# Patient Record
Sex: FEMALE | Race: WHITE | NOT HISPANIC OR LATINO | Employment: OTHER | URBAN - METROPOLITAN AREA
[De-identification: names, ages, dates, MRNs, and addresses within clinical notes are randomized per-mention and may not be internally consistent; named-entity substitution may affect disease eponyms.]

---

## 2021-05-28 ENCOUNTER — RECORDS - HEALTHEAST (OUTPATIENT)
Dept: ADMINISTRATIVE | Facility: CLINIC | Age: 86
End: 2021-05-28

## 2021-05-31 ENCOUNTER — RECORDS - HEALTHEAST (OUTPATIENT)
Dept: ADMINISTRATIVE | Facility: CLINIC | Age: 86
End: 2021-05-31

## 2021-06-01 ENCOUNTER — RECORDS - HEALTHEAST (OUTPATIENT)
Dept: ADMINISTRATIVE | Facility: CLINIC | Age: 86
End: 2021-06-01

## 2021-06-07 ENCOUNTER — RECORDS - HEALTHEAST (OUTPATIENT)
Dept: LAB | Facility: CLINIC | Age: 86
End: 2021-06-07

## 2021-06-07 ENCOUNTER — TRANSFERRED RECORDS (OUTPATIENT)
Dept: HEALTH INFORMATION MANAGEMENT | Facility: CLINIC | Age: 86
End: 2021-06-07

## 2021-06-07 ENCOUNTER — RECORDS - HEALTHEAST (OUTPATIENT)
Dept: ADMINISTRATIVE | Facility: OTHER | Age: 86
End: 2021-06-07

## 2021-06-07 LAB
ANION GAP SERPL CALCULATED.3IONS-SCNC: 13 MMOL/L (ref 5–18)
BUN SERPL-MCNC: 46 MG/DL (ref 8–28)
CALCIUM SERPL-MCNC: 10.4 MG/DL (ref 8.5–10.5)
CHLORIDE BLD-SCNC: 110 MMOL/L (ref 98–107)
CO2 SERPL-SCNC: 18 MMOL/L (ref 22–31)
CREAT SERPL-MCNC: 2.32 MG/DL (ref 0.6–1.1)
GFR SERPL CREATININE-BSD FRML MDRD: 20 ML/MIN/1.73M2
GLUCOSE BLD-MCNC: 109 MG/DL (ref 70–125)
POTASSIUM BLD-SCNC: 4.4 MMOL/L (ref 3.5–5)
SODIUM SERPL-SCNC: 141 MMOL/L (ref 136–145)

## 2021-06-08 LAB
SARS-COV-2 PCR COMMENT: NORMAL
SARS-COV-2 RNA SPEC QL NAA+PROBE: NEGATIVE
SARS-COV-2 VIRUS SPECIMEN SOURCE: NORMAL

## 2021-06-10 RX ORDER — GEMFIBROZIL 600 MG/1
600 TABLET, FILM COATED ORAL
Status: SHIPPED | COMMUNITY
Start: 2021-06-10

## 2021-06-10 RX ORDER — LATANOPROST 50 UG/ML
1 SOLUTION/ DROPS OPHTHALMIC AT BEDTIME
Status: SHIPPED | COMMUNITY
Start: 2021-06-10

## 2021-06-10 RX ORDER — DILTIAZEM HCL 60 MG
60 TABLET ORAL 4 TIMES DAILY
Status: SHIPPED | COMMUNITY
Start: 2021-06-10 | End: 2021-09-22

## 2021-06-10 RX ORDER — CITALOPRAM HYDROBROMIDE 20 MG/1
20 TABLET ORAL DAILY
Status: ON HOLD | COMMUNITY
Start: 2021-06-10 | End: 2021-09-28

## 2021-06-14 ENCOUNTER — HOSPITAL ENCOUNTER (OUTPATIENT)
Dept: NUCLEAR MEDICINE | Facility: HOSPITAL | Age: 86
Discharge: HOME | End: 2021-06-14
Payer: MEDICARE

## 2021-06-14 ENCOUNTER — HOSPITAL ENCOUNTER (OUTPATIENT)
Dept: CARDIOLOGY | Facility: HOSPITAL | Age: 86
Discharge: HOME | End: 2021-06-14
Payer: MEDICARE

## 2021-06-14 DIAGNOSIS — R94.31 ABNORMAL EKG: ICD-10-CM

## 2021-06-14 LAB
CV STRESS CURRENT BP HE: NORMAL
CV STRESS CURRENT HR HE: 68
CV STRESS CURRENT HR HE: 70
CV STRESS CURRENT HR HE: 70
CV STRESS CURRENT HR HE: 79
CV STRESS CURRENT HR HE: 80
CV STRESS CURRENT HR HE: 80
CV STRESS CURRENT HR HE: 81
CV STRESS CURRENT HR HE: 81
CV STRESS CURRENT HR HE: 82
CV STRESS CURRENT HR HE: 83
CV STRESS CURRENT HR HE: 83
CV STRESS DEVIATION TIME HE: NORMAL
CV STRESS ECHO PERCENT HR HE: NORMAL
CV STRESS EXERCISE STAGE HE: NORMAL
CV STRESS FINAL RESTING BP HE: NORMAL
CV STRESS FINAL RESTING HR HE: 80
CV STRESS MAX HR HE: 85
CV STRESS MAX TREADMILL GRADE HE: 0
CV STRESS MAX TREADMILL SPEED HE: 0
CV STRESS PEAK DIA BP HE: NORMAL
CV STRESS PEAK SYS BP HE: NORMAL
CV STRESS PHASE HE: NORMAL
CV STRESS PROTOCOL HE: NORMAL
CV STRESS RESTING PT POSITION HE: NORMAL
CV STRESS ST DEVIATION AMOUNT HE: NORMAL
CV STRESS ST DEVIATION ELEVATION HE: NORMAL
CV STRESS ST EVELATION AMOUNT HE: NORMAL
CV STRESS TEST TYPE HE: NORMAL
CV STRESS TOTAL STAGE TIME MIN 1 HE: NORMAL
RATE PRESSURE PRODUCT: NORMAL
STRESS ECHO BASELINE DIASTOLIC HE: 83
STRESS ECHO BASELINE HR: 69
STRESS ECHO BASELINE SYSTOLIC BP: 194
STRESS ECHO CALCULATED PERCENT HR: 62 %
STRESS ECHO LAST STRESS DIASTOLIC BP: 82
STRESS ECHO LAST STRESS HR: 82
STRESS ECHO LAST STRESS SYSTOLIC BP: 164
STRESS ECHO TARGET HR: 137

## 2021-06-15 ENCOUNTER — RECORDS - HEALTHEAST (OUTPATIENT)
Dept: LAB | Facility: CLINIC | Age: 86
End: 2021-06-15

## 2021-06-15 LAB
ANION GAP SERPL CALCULATED.3IONS-SCNC: 12 MMOL/L (ref 5–18)
BUN SERPL-MCNC: 44 MG/DL (ref 8–28)
CALCIUM SERPL-MCNC: 10 MG/DL (ref 8.5–10.5)
CHLORIDE BLD-SCNC: 105 MMOL/L (ref 98–107)
CO2 SERPL-SCNC: 21 MMOL/L (ref 22–31)
CREAT SERPL-MCNC: 1.93 MG/DL (ref 0.6–1.1)
GFR SERPL CREATININE-BSD FRML MDRD: 25 ML/MIN/1.73M2
GLUCOSE BLD-MCNC: 140 MG/DL (ref 70–125)
POTASSIUM BLD-SCNC: 4.6 MMOL/L (ref 3.5–5)
SODIUM SERPL-SCNC: 138 MMOL/L (ref 136–145)

## 2021-06-19 ENCOUNTER — RECORDS - HEALTHEAST (OUTPATIENT)
Dept: LAB | Facility: CLINIC | Age: 86
End: 2021-06-19

## 2021-07-09 ENCOUNTER — RECORDS - HEALTHEAST (OUTPATIENT)
Dept: LAB | Facility: CLINIC | Age: 86
End: 2021-07-09

## 2021-07-09 LAB
ALBUMIN SERPL-MCNC: 3.4 G/DL (ref 3.5–5)
ALP SERPL-CCNC: 96 U/L (ref 45–120)
ALT SERPL W P-5'-P-CCNC: 11 U/L (ref 0–45)
ANION GAP SERPL CALCULATED.3IONS-SCNC: 12 MMOL/L (ref 5–18)
AST SERPL W P-5'-P-CCNC: 20 U/L (ref 0–40)
BILIRUB SERPL-MCNC: 0.3 MG/DL (ref 0–1)
BUN SERPL-MCNC: 33 MG/DL (ref 8–28)
CALCIUM SERPL-MCNC: 10.4 MG/DL (ref 8.5–10.5)
CHLORIDE BLD-SCNC: 108 MMOL/L (ref 98–107)
CHOLEST SERPL-MCNC: 225 MG/DL
CO2 SERPL-SCNC: 17 MMOL/L (ref 22–31)
CREAT SERPL-MCNC: 1.9 MG/DL (ref 0.6–1.1)
CREAT UR-MCNC: 80.2 MG/DL
FASTING STATUS PATIENT QL REPORTED: ABNORMAL
GFR SERPL CREATININE-BSD FRML MDRD: 25 ML/MIN/1.73M2
GLUCOSE BLD-MCNC: 98 MG/DL (ref 70–125)
HDLC SERPL-MCNC: 61 MG/DL
LDLC SERPL CALC-MCNC: 152 MG/DL
MICROALBUMIN UR-MCNC: 12.45 MG/DL (ref 0–1.99)
MICROALBUMIN/CREAT UR: 155.2 MG/G
POTASSIUM BLD-SCNC: 4.4 MMOL/L (ref 3.5–5)
PROT SERPL-MCNC: 6.5 G/DL (ref 6–8)
SODIUM SERPL-SCNC: 137 MMOL/L (ref 136–145)
TRIGL SERPL-MCNC: 62 MG/DL

## 2021-09-22 ENCOUNTER — APPOINTMENT (OUTPATIENT)
Dept: CARDIOLOGY | Facility: HOSPITAL | Age: 86
DRG: 644 | End: 2021-09-22
Attending: INTERNAL MEDICINE
Payer: COMMERCIAL

## 2021-09-22 ENCOUNTER — APPOINTMENT (OUTPATIENT)
Dept: RADIOLOGY | Facility: HOSPITAL | Age: 86
DRG: 644 | End: 2021-09-22
Attending: EMERGENCY MEDICINE
Payer: COMMERCIAL

## 2021-09-22 ENCOUNTER — APPOINTMENT (OUTPATIENT)
Dept: ULTRASOUND IMAGING | Facility: HOSPITAL | Age: 86
DRG: 644 | End: 2021-09-22
Attending: INTERNAL MEDICINE
Payer: COMMERCIAL

## 2021-09-22 ENCOUNTER — APPOINTMENT (OUTPATIENT)
Dept: CT IMAGING | Facility: HOSPITAL | Age: 86
DRG: 644 | End: 2021-09-22
Attending: INTERNAL MEDICINE
Payer: COMMERCIAL

## 2021-09-22 ENCOUNTER — HOSPITAL ENCOUNTER (INPATIENT)
Facility: HOSPITAL | Age: 86
LOS: 6 days | Discharge: NURSING, REHAB OR LONG-TERM CARE | DRG: 644 | End: 2021-09-28
Attending: EMERGENCY MEDICINE | Admitting: INTERNAL MEDICINE
Payer: COMMERCIAL

## 2021-09-22 DIAGNOSIS — I50.9 ACUTE ON CHRONIC CONGESTIVE HEART FAILURE, UNSPECIFIED HEART FAILURE TYPE (H): ICD-10-CM

## 2021-09-22 DIAGNOSIS — N18.4 STAGE 4 CHRONIC KIDNEY DISEASE (H): ICD-10-CM

## 2021-09-22 DIAGNOSIS — N17.9 AKI (ACUTE KIDNEY INJURY) (H): ICD-10-CM

## 2021-09-22 DIAGNOSIS — F32.1 MODERATE MAJOR DEPRESSION, SINGLE EPISODE (H): Primary | ICD-10-CM

## 2021-09-22 DIAGNOSIS — E87.1 HYPONATREMIA: ICD-10-CM

## 2021-09-22 DIAGNOSIS — K59.00 CONSTIPATION, UNSPECIFIED CONSTIPATION TYPE: ICD-10-CM

## 2021-09-22 PROBLEM — S22.39XA RIB FRACTURE: Status: ACTIVE | Noted: 2019-07-09

## 2021-09-22 PROBLEM — M81.0 SENILE OSTEOPOROSIS: Status: ACTIVE | Noted: 2021-09-22

## 2021-09-22 LAB
ALBUMIN UR-MCNC: 100 MG/DL
AMMONIA PLAS-SCNC: 22 UMOL/L (ref 11–35)
ANION GAP SERPL CALCULATED.3IONS-SCNC: 10 MMOL/L (ref 5–18)
APPEARANCE UR: CLEAR
ATRIAL RATE - MUSE: 64 BPM
BASE EXCESS BLDA CALC-SCNC: -2.9 MMOL/L
BILIRUB UR QL STRIP: NEGATIVE
BNP SERPL-MCNC: 478 PG/ML (ref 0–167)
BUN SERPL-MCNC: 28 MG/DL (ref 8–28)
CALCIUM SERPL-MCNC: 9.9 MG/DL (ref 8.5–10.5)
CHLORIDE BLD-SCNC: 90 MMOL/L (ref 98–107)
CO2 SERPL-SCNC: 22 MMOL/L (ref 22–31)
COHGB MFR BLD: 97.2 % (ref 95–96)
COLOR UR AUTO: COLORLESS
CORTIS SERPL-MCNC: 15.5 UG/DL
CREAT SERPL-MCNC: 1.48 MG/DL (ref 0.6–1.1)
DIASTOLIC BLOOD PRESSURE - MUSE: NORMAL MMHG
ERYTHROCYTE [DISTWIDTH] IN BLOOD BY AUTOMATED COUNT: 12.7 % (ref 10–15)
GFR SERPL CREATININE-BSD FRML MDRD: 33 ML/MIN/1.73M2
GLUCOSE BLD-MCNC: 113 MG/DL (ref 70–125)
GLUCOSE UR STRIP-MCNC: NEGATIVE MG/DL
HCO3 BLD-SCNC: 22 MMOL/L (ref 23–29)
HCT VFR BLD AUTO: 34.9 % (ref 35–47)
HGB BLD-MCNC: 12.2 G/DL (ref 11.7–15.7)
HGB UR QL STRIP: NEGATIVE
HOLD SPECIMEN: NORMAL
HOLD SPECIMEN: NORMAL
INTERPRETATION ECG - MUSE: NORMAL
KETONES UR STRIP-MCNC: NEGATIVE MG/DL
LEUKOCYTE ESTERASE UR QL STRIP: NEGATIVE
LVEF ECHO: NORMAL
MCH RBC QN AUTO: 30.7 PG (ref 26.5–33)
MCHC RBC AUTO-ENTMCNC: 35 G/DL (ref 31.5–36.5)
MCV RBC AUTO: 88 FL (ref 78–100)
NITRATE UR QL: NEGATIVE
OSMOLALITY SERPL: 258 MOSM/KG (ref 270–300)
OSMOLALITY UR: 297 MOSM/KG (ref 300–900)
OXYHGB MFR BLD: 95.4 % (ref 95–96)
P AXIS - MUSE: 26 DEGREES
PCO2 BLD: 34 MM HG (ref 35–45)
PH BLD: 7.42 [PH] (ref 7.37–7.44)
PH UR STRIP: 7 [PH] (ref 5–7)
PLATELET # BLD AUTO: 372 10E3/UL (ref 150–450)
PO2 BLD: 76 MM HG (ref 75–85)
POTASSIUM BLD-SCNC: 4.1 MMOL/L (ref 3.5–5)
PR INTERVAL - MUSE: 176 MS
QRS DURATION - MUSE: 92 MS
QT - MUSE: 434 MS
QTC - MUSE: 447 MS
R AXIS - MUSE: 14 DEGREES
RBC # BLD AUTO: 3.98 10E6/UL (ref 3.8–5.2)
RBC URINE: <1 /HPF
SARS-COV-2 RNA RESP QL NAA+PROBE: NEGATIVE
SODIUM SERPL-SCNC: 120 MMOL/L (ref 136–145)
SODIUM SERPL-SCNC: 122 MMOL/L (ref 136–145)
SODIUM SERPL-SCNC: 124 MMOL/L (ref 136–145)
SODIUM SERPL-SCNC: 126 MMOL/L (ref 136–145)
SODIUM SERPL-SCNC: 127 MMOL/L (ref 136–145)
SODIUM UR-SCNC: 35 MMOL/L
SP GR UR STRIP: 1.01 (ref 1–1.03)
SQUAMOUS EPITHELIAL: <1 /HPF
SYSTOLIC BLOOD PRESSURE - MUSE: NORMAL MMHG
T AXIS - MUSE: 101 DEGREES
TEMPERATURE: 37 DEGREES C
TROPONIN I SERPL-MCNC: 0.04 NG/ML (ref 0–0.29)
TROPONIN I SERPL-MCNC: 0.04 NG/ML (ref 0–0.29)
TSH SERPL DL<=0.005 MIU/L-ACNC: 1.45 UIU/ML (ref 0.3–5)
UROBILINOGEN UR STRIP-MCNC: <2 MG/DL
VENTILATION MODE: ABNORMAL
VENTRICULAR RATE- MUSE: 64 BPM
WBC # BLD AUTO: 10.1 10E3/UL (ref 4–11)
WBC URINE: 1 /HPF

## 2021-09-22 PROCEDURE — 86255 FLUORESCENT ANTIBODY SCREEN: CPT | Performed by: INTERNAL MEDICINE

## 2021-09-22 PROCEDURE — 272N000451 HC KIT SHRLOCK 5FR POWER PICC DOUBLE LUMEN

## 2021-09-22 PROCEDURE — 84484 ASSAY OF TROPONIN QUANT: CPT | Performed by: INTERNAL MEDICINE

## 2021-09-22 PROCEDURE — 36600 WITHDRAWAL OF ARTERIAL BLOOD: CPT

## 2021-09-22 PROCEDURE — 93005 ELECTROCARDIOGRAM TRACING: CPT

## 2021-09-22 PROCEDURE — 84300 ASSAY OF URINE SODIUM: CPT | Performed by: INTERNAL MEDICINE

## 2021-09-22 PROCEDURE — 250N000013 HC RX MED GY IP 250 OP 250 PS 637: Performed by: EMERGENCY MEDICINE

## 2021-09-22 PROCEDURE — 70450 CT HEAD/BRAIN W/O DYE: CPT

## 2021-09-22 PROCEDURE — 83520 IMMUNOASSAY QUANT NOS NONAB: CPT

## 2021-09-22 PROCEDURE — 84999 UNLISTED CHEMISTRY PROCEDURE: CPT | Performed by: INTERNAL MEDICINE

## 2021-09-22 PROCEDURE — 93005 ELECTROCARDIOGRAM TRACING: CPT | Performed by: EMERGENCY MEDICINE

## 2021-09-22 PROCEDURE — 83880 ASSAY OF NATRIURETIC PEPTIDE: CPT | Performed by: EMERGENCY MEDICINE

## 2021-09-22 PROCEDURE — 250N000011 HC RX IP 250 OP 636: Performed by: EMERGENCY MEDICINE

## 2021-09-22 PROCEDURE — 76770 US EXAM ABDO BACK WALL COMP: CPT

## 2021-09-22 PROCEDURE — 84484 ASSAY OF TROPONIN QUANT: CPT | Performed by: EMERGENCY MEDICINE

## 2021-09-22 PROCEDURE — 250N000013 HC RX MED GY IP 250 OP 250 PS 637: Performed by: STUDENT IN AN ORGANIZED HEALTH CARE EDUCATION/TRAINING PROGRAM

## 2021-09-22 PROCEDURE — 36415 COLL VENOUS BLD VENIPUNCTURE: CPT | Performed by: EMERGENCY MEDICINE

## 2021-09-22 PROCEDURE — 84295 ASSAY OF SERUM SODIUM: CPT | Performed by: INTERNAL MEDICINE

## 2021-09-22 PROCEDURE — 71045 X-RAY EXAM CHEST 1 VIEW: CPT

## 2021-09-22 PROCEDURE — 999N000157 HC STATISTIC RCP TIME EA 10 MIN

## 2021-09-22 PROCEDURE — 82805 BLOOD GASES W/O2 SATURATION: CPT | Performed by: INTERNAL MEDICINE

## 2021-09-22 PROCEDURE — 82140 ASSAY OF AMMONIA: CPT | Performed by: INTERNAL MEDICINE

## 2021-09-22 PROCEDURE — 85027 COMPLETE CBC AUTOMATED: CPT | Performed by: EMERGENCY MEDICINE

## 2021-09-22 PROCEDURE — 96374 THER/PROPH/DIAG INJ IV PUSH: CPT

## 2021-09-22 PROCEDURE — 93306 TTE W/DOPPLER COMPLETE: CPT | Mod: 26 | Performed by: INTERNAL MEDICINE

## 2021-09-22 PROCEDURE — 250N000009 HC RX 250: Performed by: STUDENT IN AN ORGANIZED HEALTH CARE EDUCATION/TRAINING PROGRAM

## 2021-09-22 PROCEDURE — 99285 EMERGENCY DEPT VISIT HI MDM: CPT | Mod: 25

## 2021-09-22 PROCEDURE — 93306 TTE W/DOPPLER COMPLETE: CPT

## 2021-09-22 PROCEDURE — 87635 SARS-COV-2 COVID-19 AMP PRB: CPT | Performed by: EMERGENCY MEDICINE

## 2021-09-22 PROCEDURE — 80048 BASIC METABOLIC PNL TOTAL CA: CPT | Performed by: EMERGENCY MEDICINE

## 2021-09-22 PROCEDURE — 99223 1ST HOSP IP/OBS HIGH 75: CPT | Mod: AI | Performed by: INTERNAL MEDICINE

## 2021-09-22 PROCEDURE — 83935 ASSAY OF URINE OSMOLALITY: CPT | Performed by: INTERNAL MEDICINE

## 2021-09-22 PROCEDURE — 84999 UNLISTED CHEMISTRY PROCEDURE: CPT

## 2021-09-22 PROCEDURE — C9803 HOPD COVID-19 SPEC COLLECT: HCPCS

## 2021-09-22 PROCEDURE — 36415 COLL VENOUS BLD VENIPUNCTURE: CPT | Performed by: INTERNAL MEDICINE

## 2021-09-22 PROCEDURE — 81001 URINALYSIS AUTO W/SCOPE: CPT | Performed by: STUDENT IN AN ORGANIZED HEALTH CARE EDUCATION/TRAINING PROGRAM

## 2021-09-22 PROCEDURE — 210N000001 HC R&B IMCU HEART CARE

## 2021-09-22 PROCEDURE — 82533 TOTAL CORTISOL: CPT | Performed by: INTERNAL MEDICINE

## 2021-09-22 PROCEDURE — 83930 ASSAY OF BLOOD OSMOLALITY: CPT | Performed by: INTERNAL MEDICINE

## 2021-09-22 PROCEDURE — 36569 INSJ PICC 5 YR+ W/O IMAGING: CPT

## 2021-09-22 PROCEDURE — 84443 ASSAY THYROID STIM HORMONE: CPT | Performed by: INTERNAL MEDICINE

## 2021-09-22 PROCEDURE — 250N000013 HC RX MED GY IP 250 OP 250 PS 637: Performed by: INTERNAL MEDICINE

## 2021-09-22 PROCEDURE — 250N000011 HC RX IP 250 OP 636: Performed by: INTERNAL MEDICINE

## 2021-09-22 RX ORDER — DILTIAZEM HYDROCHLORIDE 120 MG/1
240 CAPSULE, EXTENDED RELEASE ORAL DAILY
Status: DISCONTINUED | OUTPATIENT
Start: 2021-09-22 | End: 2021-09-28 | Stop reason: HOSPADM

## 2021-09-22 RX ORDER — FUROSEMIDE 10 MG/ML
20 INJECTION INTRAMUSCULAR; INTRAVENOUS ONCE
Status: COMPLETED | OUTPATIENT
Start: 2021-09-22 | End: 2021-09-22

## 2021-09-22 RX ORDER — GEMFIBROZIL 600 MG/1
600 TABLET, FILM COATED ORAL
Status: DISCONTINUED | OUTPATIENT
Start: 2021-09-22 | End: 2021-09-28 | Stop reason: HOSPADM

## 2021-09-22 RX ORDER — LORAZEPAM 2 MG/ML
0.5 INJECTION INTRAMUSCULAR ONCE
Status: COMPLETED | OUTPATIENT
Start: 2021-09-22 | End: 2021-09-22

## 2021-09-22 RX ORDER — 3% SODIUM CHLORIDE 3 G/100ML
INJECTION, SOLUTION INTRAVENOUS CONTINUOUS
Status: DISCONTINUED | OUTPATIENT
Start: 2021-09-22 | End: 2021-09-28 | Stop reason: HOSPADM

## 2021-09-22 RX ORDER — BISACODYL 10 MG
10 SUPPOSITORY, RECTAL RECTAL DAILY PRN
Status: DISCONTINUED | OUTPATIENT
Start: 2021-09-22 | End: 2021-09-28 | Stop reason: HOSPADM

## 2021-09-22 RX ORDER — ACETAMINOPHEN 650 MG/1
650 SUPPOSITORY RECTAL EVERY 6 HOURS PRN
Status: DISCONTINUED | OUTPATIENT
Start: 2021-09-22 | End: 2021-09-28 | Stop reason: HOSPADM

## 2021-09-22 RX ORDER — CITALOPRAM HYDROBROMIDE 10 MG/1
20 TABLET ORAL DAILY
Status: DISCONTINUED | OUTPATIENT
Start: 2021-09-22 | End: 2021-09-22

## 2021-09-22 RX ORDER — LORAZEPAM 0.5 MG/1
0.25 TABLET ORAL ONCE
Status: COMPLETED | OUTPATIENT
Start: 2021-09-22 | End: 2021-09-22

## 2021-09-22 RX ORDER — ASPIRIN 81 MG/1
162 TABLET, CHEWABLE ORAL ONCE
Status: COMPLETED | OUTPATIENT
Start: 2021-09-22 | End: 2021-09-22

## 2021-09-22 RX ORDER — AMOXICILLIN 250 MG
2 CAPSULE ORAL 2 TIMES DAILY PRN
Status: DISCONTINUED | OUTPATIENT
Start: 2021-09-22 | End: 2021-09-28 | Stop reason: HOSPADM

## 2021-09-22 RX ORDER — ONDANSETRON 4 MG/1
4 TABLET, ORALLY DISINTEGRATING ORAL EVERY 6 HOURS PRN
Status: DISCONTINUED | OUTPATIENT
Start: 2021-09-22 | End: 2021-09-28 | Stop reason: HOSPADM

## 2021-09-22 RX ORDER — SODIUM BICARBONATE 650 MG/1
1300 TABLET ORAL 2 TIMES DAILY
Status: DISCONTINUED | OUTPATIENT
Start: 2021-09-22 | End: 2021-09-23

## 2021-09-22 RX ORDER — AMOXICILLIN 250 MG
1 CAPSULE ORAL 2 TIMES DAILY PRN
Status: DISCONTINUED | OUTPATIENT
Start: 2021-09-22 | End: 2021-09-28 | Stop reason: HOSPADM

## 2021-09-22 RX ORDER — LATANOPROST 50 UG/ML
1 SOLUTION/ DROPS OPHTHALMIC AT BEDTIME
Status: DISCONTINUED | OUTPATIENT
Start: 2021-09-22 | End: 2021-09-28 | Stop reason: HOSPADM

## 2021-09-22 RX ORDER — ONDANSETRON 2 MG/ML
4 INJECTION INTRAMUSCULAR; INTRAVENOUS EVERY 6 HOURS PRN
Status: DISCONTINUED | OUTPATIENT
Start: 2021-09-22 | End: 2021-09-28 | Stop reason: HOSPADM

## 2021-09-22 RX ORDER — SODIUM BICARBONATE 650 MG/1
1300 TABLET ORAL 2 TIMES DAILY
Status: ON HOLD | COMMUNITY
End: 2021-09-28

## 2021-09-22 RX ORDER — NITROGLYCERIN 80 MG/1
1 PATCH TRANSDERMAL ONCE
Status: COMPLETED | OUTPATIENT
Start: 2021-09-22 | End: 2021-09-22

## 2021-09-22 RX ORDER — CITALOPRAM HYDROBROMIDE 10 MG/1
10 TABLET ORAL DAILY
Status: DISCONTINUED | OUTPATIENT
Start: 2021-09-23 | End: 2021-09-28 | Stop reason: HOSPADM

## 2021-09-22 RX ORDER — LIDOCAINE 40 MG/G
CREAM TOPICAL
Status: DISCONTINUED | OUTPATIENT
Start: 2021-09-22 | End: 2021-09-28 | Stop reason: HOSPADM

## 2021-09-22 RX ORDER — SODIUM CHLORIDE 9 MG/ML
INJECTION, SOLUTION INTRAVENOUS CONTINUOUS
Status: DISCONTINUED | OUTPATIENT
Start: 2021-09-22 | End: 2021-09-22 | Stop reason: ALTCHOICE

## 2021-09-22 RX ORDER — ACETAMINOPHEN 325 MG/1
650 TABLET ORAL EVERY 6 HOURS PRN
Status: DISCONTINUED | OUTPATIENT
Start: 2021-09-22 | End: 2021-09-28 | Stop reason: HOSPADM

## 2021-09-22 RX ORDER — DILTIAZEM HYDROCHLORIDE 240 MG/1
240 CAPSULE, EXTENDED RELEASE ORAL DAILY
COMMUNITY
End: 2022-01-01 | Stop reason: DRUGHIGH

## 2021-09-22 RX ADMIN — LIDOCAINE HYDROCHLORIDE 1.5 ML: 10 INJECTION, SOLUTION EPIDURAL; INFILTRATION; INTRACAUDAL; PERINEURAL at 16:32

## 2021-09-22 RX ADMIN — Medication 1 MG: at 21:45

## 2021-09-22 RX ADMIN — ASPIRIN 81 MG CHEWABLE TABLET 162 MG: 81 TABLET CHEWABLE at 02:48

## 2021-09-22 RX ADMIN — DORZOLAMIDE HYDROCHLORIDE AND TIMOLOL MALEATE 1 DROP: 20; 5 SOLUTION/ DROPS OPHTHALMIC at 21:51

## 2021-09-22 RX ADMIN — FUROSEMIDE 20 MG: 10 INJECTION, SOLUTION INTRAMUSCULAR; INTRAVENOUS at 16:51

## 2021-09-22 RX ADMIN — SODIUM BICARBONATE 1300 MG: 648 TABLET ORAL at 20:10

## 2021-09-22 RX ADMIN — Medication 1 PACKET: at 20:10

## 2021-09-22 RX ADMIN — OMEPRAZOLE 20 MG: 20 CAPSULE, DELAYED RELEASE ORAL at 13:43

## 2021-09-22 RX ADMIN — NITROGLYCERIN 1 PATCH: 0.4 PATCH TRANSDERMAL at 02:48

## 2021-09-22 RX ADMIN — LORAZEPAM 0.25 MG: 0.5 TABLET ORAL at 03:46

## 2021-09-22 RX ADMIN — LATANOPROST 1 DROP: 50 SOLUTION OPHTHALMIC at 21:52

## 2021-09-22 RX ADMIN — DILTIAZEM HYDROCHLORIDE 240 MG: 120 CAPSULE, EXTENDED RELEASE ORAL at 13:43

## 2021-09-22 RX ADMIN — SODIUM BICARBONATE 1300 MG: 648 TABLET ORAL at 13:43

## 2021-09-22 RX ADMIN — LORAZEPAM 0.5 MG: 2 INJECTION INTRAMUSCULAR; INTRAVENOUS at 05:14

## 2021-09-22 RX ADMIN — CITALOPRAM HYDROBROMIDE 20 MG: 20 TABLET ORAL at 13:42

## 2021-09-22 ASSESSMENT — MIFFLIN-ST. JEOR
SCORE: 927.62
SCORE: 949.4
SCORE: 949.4

## 2021-09-22 ASSESSMENT — ACTIVITIES OF DAILY LIVING (ADL): DEPENDENT_IADLS:: TRANSPORTATION;CLEANING

## 2021-09-22 NOTE — ED NOTES
"[facility] ED Handoff Report    ED Chief Complaint: Shortness of breath and confusion    ED Diagnosis:  (E87.1) Hyponatremia  Comment: Most recent Na+ level is 120. Nephrology saw her this morning. He was considering ordering a hypertonic Na+ infusion, but as of this time, has not.   Plan: Continued monitoring of her sodium levels.     (I50.9) Acute on chronic congestive heart failure, unspecified heart failure type (H)  Comment: Bnp was elevated at 478. She does have congestive heart failure. She initially came in with shortness of breath. She has had respiratory rate within normal limits since assuming her care at 7am. Lung sounds were diminished, but clear. No lower extremity edema.  Plan: Monitor I and O.    (N17.9) KAM (acute kidney injury) (H)  Comment: Noting that her creatinine was 1.98 with a GFR of 33.   Plan: She was seen by nephrology (Dr. Saxena)       PMH:  History reviewed. No pertinent past medical history.     Code Status:  No Order     Falls Risk: Yes Band: Applied    Current Living Situation/Residence: lives with their son or daughter     Elimination Status: Continent: No     Activity Level: SBA w/ walker    Patients Preferred Language:  English     Needed: No    Vital Signs:  BP (!) 177/85   Pulse 70   Temp 97.8  F (36.6  C)   Resp 21   Ht 1.6 m (5' 3\")   Wt 50.3 kg (111 lb)   SpO2 96%   BMI 19.66 kg/m       Cardiac Rhythm: Normal sinus rhythm with rates in the 70's, drops into the 50's with sleep.     Pain Score: 1/10    Is the Patient Confused:  Yes    Last Food or Drink: 09/22/21 at noon    Focused Assessment:  Initially my first assessment at 7am revealed that she was very restless and confused, picking has her blankets and gown. Sticking her legs out of the rails. She was difficult to redirect. She had received ativan during the night. At about 9am, she fell asleep and napped about 2 hours. She appears much more alert and appropriate at this time. She was able to " interact with nephrologist well during exam. Lung sounds were diminished and she has not appeared to be short of breath. Heart tones were normal with rhythm per monitor to be in normal sinus rhythm with notched p wave.     Tests Performed: Done: Labs    Treatments Provided:  She does have a pur wick in place. Food intake was good for lunch, eating 85%. I did assist with a bed bath this morning and put lotion on her body for comfort and skin care.    Family Dynamics/Concerns: No    Family Updated On Visitor Policy: Yes    Plan of Care Communicated to Family: Yes    Who Was Updated about Plan of Care: family    Belongings Checklist Done and Signed by Patient: Yes    Covid: asymptomatic , negative    Additional Information: She is very pleasant, but I would consider her at high risk for falls due to confusion.    RN: Cindy Brush RN 9/22/2021 12:42 PM     1: Helped with repositioning at this time onto her right side for comfort and skin protection.   2: Her home medications, including antihypertensives available to give at 1:36pm. Given.

## 2021-09-22 NOTE — CONSULTS
RENAL CONSULTATION:     Date of Consultation:  9/22/2021    Requesting Physician: Dr Desai  Reason for Consult:  Hyponatremia    Assessment/ Recommendations:  1. Hyponatremia: Sodiums of 122 on admission. No noted hx of previous hyponatremia. Urine sodium of 35.  Cortisol normal. TSH pending. Urine osm and serum osm pending. Patient is on selective serotonin reuptake inhibitor and could be SIADH. Euvolemic on exam.  -q4 hour sodium  -Serum osm and urine osm pending.   -Sodium dropped with IVF consistent with SIADH, euvolemic on exam.   -Fluid restriction to 1.2L  -With acute drop and confusion(likey multifactorial with benzo) will start hypertonic until trending >123 today.     Would taper off citalopram at this time.       2. CKD stage 4: at baseline currently. Noted nephrotic range proteinuria of unclear cause. No NSAIDs Previously seen by Dr. Beaver on 9/21. UA on admission only notable for protein. Recent urine protein to creatinine ratio of 5.48. No hx of DM. Unclear duration of proteinuria with limited records at this time. Has MM screen pending from renal clinic on 9/21(not resulted yet). Will get renal US this admission. Will send PLA2R, RUPESH.   3. SOB: CXR clear, slight elevation in BNP noted( patients current renal function is overestimated for her true GFR, very low muscle mass). DOes have large hiatal hernia. No edema on exam. Appears euvolemic. Would get echo per primary.   4. Encephalopathy: Per primary.   5. Hx of hypercalcemia: improved after stopping calcium supplements and vitamin D. Last PTH slightly elevated.  6. Hx of hyperkalemia: monitor.        Momo Xavier,   [facility]  [phone] (off)       History of present illness:  Ms Richardson is a 84 y/o female with pMH of CKD, HTN, HLD, hital hernia. Patient presents with sob, fatigue and lightheadedness. Patient confused overnight and received benzos. Waking up more this am. Still a little confused. Reports no  sob or pain currently. No fever, chills. Reports no issues with urination. No recent NSAID use. Denies nay leg swelling. NO new meds. NO large fluid intake. Reports eating ok. Reports being on citalopram for years. NO other new changes. Falls asleep very easily during eval.     History reviewed. No pertinent past medical history.    Drug and lactation database from the [place] National Library of Medicine:  http://toxnet.nlm.nih.gov/cgi-bin/sis/htmlgen?LACT      Allergies   Allergen Reactions     Statins-Hmg-Coa Reductase Inhibitors [Hmg-Coa-R Inhibitors] Muscle Pain (Myalgia)       Social History     Socioeconomic History     Marital status:      Spouse name: None     Number of children: None     Years of education: None     Highest education level: None   Occupational History     None   Tobacco Use     Smoking status: None   Substance and Sexual Activity     Alcohol use: None     Drug use: None     Sexual activity: None   Other Topics Concern     None   Social History Narrative     None     Social Determinants of Health     Financial Resource Strain:      Difficulty of Paying Living Expenses:    Food Insecurity:      Worried About Running Out of Food in the Last Year:      Ran Out of Food in the Last Year:    Transportation Needs:      Lack of Transportation (Medical):      Lack of Transportation (Non-Medical):    Physical Activity:      Days of Exercise per Week:      Minutes of Exercise per Session:    Stress:      Feeling of Stress :    Social Connections:      Frequency of Communication with Friends and Family:      Frequency of Social Gatherings with Friends and Family:      Attends Taoist Services:      Active Member of Clubs or Organizations:      Attends Club or Organization Meetings:      Marital Status:    Intimate Partner Violence:      Fear of Current or Ex-Partner:      Emotionally Abused:      Physically Abused:      Sexually Abused:        History reviewed. No pertinent family  "history.    Review of Systems: . The remainder of 10 point review of systems is negative except as noted in HPI above.     /68   Pulse 60   Temp 97.8  F (36.6  C)   Resp 14   Ht 1.6 m (5' 3\")   Wt 50.3 kg (111 lb)   SpO2 96%   BMI 19.66 kg/m    No intake or output data in the 24 hours ending 09/22/21 1048  Physical Exam:   GENERAL: frail, chronically ill.   EYES: No scleral icterus  ENT:  Oral mucosa moist  RESP: Clear to auscultation bilaterally with no respiratory distress, normal effort.  CV: RRR, no leg edema.    GI: Soft, NT/ND,   Musculoskeletal: Decreased muscle bulk/ tone; No gross joint abnormalities  SKIN: No rash,   PSYCH:  Fatigued, confused at times.     LABS:  Most Recent 3 CBC's:Recent Labs   Lab Test 09/22/21 0224   WBC 10.1   HGB 12.2   MCV 88        Most Recent 3 BMP's:Recent Labs   Lab Test 09/22/21 0224 07/09/21  1449 06/15/21  0957   * 137 138   POTASSIUM 4.1 4.4 4.6   CHLORIDE 90* 108* 105   CO2 22 17* 21*   BUN 28 33* 44*   CR 1.48* 1.90* 1.93*   ANIONGAP 10 12 12   GISELE 9.9 10.4 10.0    98 140*     Most Recent 2 LFT's:Recent Labs   Lab Test 07/09/21  1449   AST 20   ALT 11   ALKPHOS 96   BILITOTAL 0.3     Most Recent 3 INR's:No lab results found.  Anticoagulation Dose History    There is no flowsheet data to display.         Most Recent 3 Creatinines:Recent Labs   Lab Test 09/22/21 0224 07/09/21  1449 06/15/21  0957   CR 1.48* 1.90* 1.93*     Most Recent 3 Hemoglobins:Recent Labs   Lab Test 09/22/21 0224   HGB 12.2     Most Recent 3 Troponin's:No lab results found.  Most Recent 3 BNP's:No lab results found.  Most Recent D-dimer:No lab results found.  Most Recent Cholesterol Panel:Recent Labs   Lab Test 07/09/21  1449   CHOL 225*   *   HDL 61   TRIG 62       All lab data was reviewed at 10:48 AM    "

## 2021-09-22 NOTE — ED NOTES
ED MD aware of low BP, Pt is a hospitalist pt but has not been evaluated by Hospitalist and no in patient orders have been placed at this time.

## 2021-09-22 NOTE — ED PROVIDER NOTES
EMERGENCY DEPARTMENT ENCOUNTER      FINAL IMPRESSION    1. Hyponatremia    2. Acute on chronic congestive heart failure, unspecified heart failure type (H)    3. KAM (acute kidney injury) (H)        MEDICAL DECISION MAKING    85-year-old with hypertension hyperlipidemia no reported cardiac disease presenting to the ED with progressively worsening shortness of breath now with orthopnea and exertional dyspnea in the absence of pain or pleurisy nor leg swelling or pain.  Vital signs reviewed and arrival with hypertension which may be the cause for hypertensive urgency and flash pulmonary edema.  Additional concern for an occult infectious etiology possible Covid versus bacterial infection though no concurrent systems otherwise.  Additional concerns for ACS.  Do not have a high suspicion for dissection or PE.     IV established lab work is sent is noted with significant hyponatremia 122 which is acute.  Patient BNP elevated in the 400s.  Thusly based on clinical evaluation with orthopnea and mild coarse basilar rales and with elevated BNP I suspect this to be more of a hypervolemic hyponatremic scenario.  We will hold on diuresis and fluid restrict at this time. Troponin is negative.     EKG on arrival is without any active cath activating lesion.     Chest x-ray shows hiatal hernia without significant evidence of infiltrate and no obvious evidence of significant pulmonary edema.     Ultimate through ED course patient given aspirin as well as nitro with improvement.  Patient will be hospitalized for further cardiac investigation and correction of her hyponatremia.  Certainly the patient's large hiatal hernia could be additional supplemental cause for her shortness of breath.     There are no beds available and pt will require transfer versus boarding.     Pt was updated with all results and discussed plan and amenable as above. Pt discussed with hospitalist.    Due to contagious pathogen concern full protective  equipment was utilized through the duration of the interaction including N95  mask, eye shield, hair cover, gown and gloves.     MEDICATIONS GIVEN IN THE EMERGENCY:  Medications   nitroGLYcerin (NITRODUR) 0.4 MG/HR 24 hr patch 1 patch (1 patch Transdermal Patch/Med Applied 9/22/21 0248)   aspirin (ASA) chewable tablet 162 mg (162 mg Oral Given 9/22/21 0248)   LORazepam (ATIVAN) half-tab 0.25 mg (0.25 mg Oral Given 9/22/21 0346)       NEW PRESCRIPTIONS STARTED AT TODAY'S ER VISIT     Review of your medicines      UNREVIEWED medicines. Ask your doctor about these medicines      Dose / Directions   citalopram 40 MG tablet  Commonly known as: celeXA      Dose: 40 mg  [CITALOPRAM (CELEXA) 40 MG TABLET] Take 40 mg by mouth daily.  Refills: 0     diltiazem 60 MG tablet  Commonly known as: CARDIZEM      Dose: 60 mg  [DILTIAZEM (CARDIZEM) 60 MG TABLET] Take 60 mg by mouth 4 (four) times a day.  Refills: 0     gemfibrozil 600 MG tablet  Commonly known as: LOPID      Dose: 600 mg  [GEMFIBROZIL (LOPID) 600 MG TABLET] Take 600 mg by mouth 2 (two) times a day before meals.  Refills: 0     latanoprost 0.005 % ophthalmic solution  Commonly known as: XALATAN      Dose: 1 drop  [LATANOPROST (XALATAN) 0.005 % OPHTHALMIC SOLUTION] 1 drop at bedtime.  Refills: 0                CHIEF COMPLAINT    Chief Complaint   Patient presents with     Dizziness     Shortness of Breath     Fatigue         HPI    Holli Richardson is a 85 year old female with pertinent past medical history for stage 4 Chronic Kidney Disease, and hyperlipidemia who presents to this Emergency Department for evaluation of shortness of breath and light headedness.    The patient reports for the past 5 days she had been experiencing ongoing dizziness, light headedness, and weakness. She presented to the [facility] on 09/04 with similar symptoms and followed-up with her PCP. However, the last few days even minimal exertion causes her to have shortness of breath with  wheezing. Since 2100 last night (9/21) she has been unable to sleep and has difficulty breathing while lying down which eventually prompted her to present to the ED. Furthermore, the patient's weight has dropped from ~115 on 9/4 to ~111.4 recently. She does not take water pills. She does not do breathing treatments at home. The patient denies any pain, leg swelling, and any other symptoms or complaints at this time.      Of note, visitor reports noticing the patient being slightly confused and requests a UA.     Per Chart Review,  09/04/2021 the patient presented to [place] [facility] for evaluation of light headedness. Symptoms improved after hydration with 15 ml per kilogram of normal saline. There is slight abnormalities in her lab tests including mild elevation in her creatine compared to her baseline and slight elevation of her potassium.    This document serves as a record of services performed by Dr. Dany Beckett. It was created on his behalf by Dirk Hughes, trained medical scribe. The creation of this record is based on the scribes personal observations and the providers statements to him/her. This document has been checked and approved by the attending provider.    RELEVANT HISTORY, MEDICATIONS, & ALLERGIES   Past medical history, surgical history, family history, medications, and allergies reviewed and pertinent noted in HPI. See end of note for comprehensive list.    REVIEW OF SYSTEMS    Constitutional:  No fever or chills, no weakness. Positive for weight loss  ENT: No sore throat. No congestion  Respiratory: No cough. Positive for shortness of breath and wheezing.  Cardiovascular:  No chest pain, no palpitations, no syncope.  GI:  No abdominal pain, no nausea, no vomiting, no diarrhea.   : No dysuria, No hematuria, No frequency.  Musculoskeletal:  No new muscle/joint pain, no swelling, no loss of function.   Skin:  No rash.  Psych: positive for confusion  Neurologic:  No headache.  "Positive for light headedness, weakness, and dizziness     All other systems reviewed and are negative.    PHYSICAL EXAM   VITALS SIGNS: BP (!) 151/93   Pulse 64   Temp 97.8  F (36.6  C)   Resp 24   Ht 1.6 m (5' 3\")   Wt 50.3 kg (111 lb)   SpO2 97%   BMI 19.66 kg/m    Constitutional:  Awake, Alert, Cooperative. Frail and cachectic  HENT: Normocephalic, Atraumatic, Bilateral external ears normal, Oropharynx moist, Nose normal.   Neck: Normal range of motion, No tenderness, Supple, No meningismus, No stridor.   Eyes: PERRL, EOMI, Conjunctiva normal, No discharge.   Respiratory: Normal breath sounds, No respiratory distress, No wheezing, No chest tenderness.  Subtle basilar rales bilaterally.  Cardiovascular: Normal heart rate, Normal rhythm, No murmurs, No rubs, No gallops.   GI: Soft, No tenderness, No guarding, No CVA tenderness.   Musculoskeletal: Neurovascularly intact distally, No edema, No tenderness  Integument: Warm, Dry, No erythema, No rash.   Neurologic: Alert & oriented x 3, Normal motor function, Normal sensory function, No focal deficits noted.     LABS  Labs Ordered and Resulted from Time of ED Arrival Up to the Time of Departure from the ED   CBC WITH PLATELETS - Abnormal; Notable for the following components:       Result Value    Hematocrit 34.9 (*)     All other components within normal limits   BASIC METABOLIC PANEL - Abnormal; Notable for the following components:    Sodium 122 (*)     Chloride 90 (*)     Creatinine 1.48 (*)     GFR Estimate 33 (*)     All other components within normal limits   B-TYPE NATRIURETIC PEPTIDE ([facility] ONLY) - Abnormal; Notable for the following components:     (*)     All other components within normal limits   TROPONIN I - Normal   COVID-19 VIRUS (CORONAVIRUS) BY PCR - Normal    Narrative:     Testing was performed using the [employer]  SARS-CoV-2 & Influenza A/B Assay on the karl  [employer]  System.  This test should be ordered for the detection of SARS-COV-2 " in individuals who meet SARS-CoV-2 clinical and/or epidemiological criteria. Test performance is unknown in asymptomatic patients.  This test is for in vitro diagnostic use under the FDA EUA for laboratories certified under CLIA to perform moderate and/or high complexity testing. This test has not been FDA cleared or approved.  A negative test does not rule out the presence of PCR inhibitors in the specimen or target RNA in concentration below the limit of detection for the assay. The possibility of a false negative should be considered if the patient's recent exposure or clinical presentation suggests COVID-19.  [facility] are certified under the Clinical Laboratory Improvement Amendments of 1988 (CLIA-88) as qualified to perform moderate and/or high complexity laboratory testing.   ROUTINE UA WITH MICROSCOPIC REFLEX TO CULTURE   CALL         EKG    Sinus rhythm rate of 64, QRS 92, QTc 447.  Nonspecific findings borderline left bundle branch morphology.  No acute ischemic evidence.  No prior for comparison    RADIOLOGY    Please see official radiology report.  XR Chest Port 1 View   Preliminary Result   IMPRESSION: Normal heart size and pulmonary vascularity. No acute appearing infiltrates or consolidation. Very large hiatal hernia. Aortic calcification. Degenerative changes both shoulders and multiple old left rib fractures. Remainder unremarkable with    no significant change from the prior study.            All laboratories, imaging and EKG's were personally reviewed and interpreted by myself prior to disposition decision.     PROCEDURES    None    Comprehensive outline of EPIC chart Hx  PAST MEDICAL HISTORY    History reviewed. No pertinent past medical history.  History reviewed. No pertinent surgical history.    CURRENT MEDICATIONS      Current Facility-Administered Medications:      nitroGLYcerin (NITRODUR) 0.4 MG/HR 24 hr patch 1 patch, 1 patch, Transdermal, Once, Dany Beckett MD, 1  patch at 09/22/21 0248    Current Outpatient Medications:      citalopram (CELEXA) 40 MG tablet, [CITALOPRAM (CELEXA) 40 MG TABLET] Take 40 mg by mouth daily., Disp: , Rfl:      diltiazem (CARDIZEM) 60 MG tablet, [DILTIAZEM (CARDIZEM) 60 MG TABLET] Take 60 mg by mouth 4 (four) times a day., Disp: , Rfl:      gemfibroziL (LOPID) 600 MG tablet, [GEMFIBROZIL (LOPID) 600 MG TABLET] Take 600 mg by mouth 2 (two) times a day before meals., Disp: , Rfl:      latanoprost (XALATAN) 0.005 % ophthalmic solution, [LATANOPROST (XALATAN) 0.005 % OPHTHALMIC SOLUTION] 1 drop at bedtime., Disp: , Rfl:     ALLERGIES    Allergies   Allergen Reactions     Statins-Hmg-Coa Reductase Inhibitors [Hmg-Coa-R Inhibitors] Muscle Pain (Myalgia)       FAMILY HISTORY    History reviewed. No pertinent family history.    SOCIAL HISTORY    Social History     Socioeconomic History     Marital status:      Spouse name: Not on file     Number of children: Not on file     Years of education: Not on file     Highest education level: Not on file   Occupational History     Not on file   Tobacco Use     Smoking status: Not on file   Substance and Sexual Activity     Alcohol use: Not on file     Drug use: Not on file     Sexual activity: Not on file   Other Topics Concern     Not on file   Social History Narrative     Not on file     Social Determinants of Health     Financial Resource Strain:      Difficulty of Paying Living Expenses:    Food Insecurity:      Worried About Running Out of Food in the Last Year:      Ran Out of Food in the Last Year:    Transportation Needs:      Lack of Transportation (Medical):      Lack of Transportation (Non-Medical):    Physical Activity:      Days of Exercise per Week:      Minutes of Exercise per Session:    Stress:      Feeling of Stress :    Social Connections:      Frequency of Communication with Friends and Family:      Frequency of Social Gatherings with Friends and Family:      Attends Confucianism Services:       Active Member of Clubs or Organizations:      Attends Club or Organization Meetings:      Marital Status:    Intimate Partner Violence:      Fear of Current or Ex-Partner:      Emotionally Abused:      Physically Abused:      Sexually Abused:        This document serves as a record of services performed by Dr. Dany Beckett. It was created on his behalf by Dirk Hughes, trained medical scribe. The creation of this record is based on the scribes personal observations and the providers statements to him/her.     I Dr. Dany Beckett, personally performed the services described in this documentation as scribed by the above named individual in my presence, and it is both accurate and complete.        Dany Beckett MD  09/22/21 2741

## 2021-09-22 NOTE — ED NOTES
Nursing Update: Sleep: Patient sleeping at this time. She is arousable and follows simple commands, including verbal responses, but falls back to sleep. She does make purposeful movements.  Unable to eat her breakfast at this time.

## 2021-09-22 NOTE — PHARMACY-ADMISSION MEDICATION HISTORY
Pharmacy Note - Admission Medication History    Pertinent Provider Information:      ______________________________________________________________________    Prior To Admission (PTA) med list completed and updated in EMR.       PTA Med List   Medication Sig Last Dose     citalopram (CELEXA) 20 MG tablet Take 20 mg by mouth daily  9/21/2021 at Unknown time     diltiazem ER (DILT-XR) 240 MG 24 hr ER beaded capsule Take 240 mg by mouth daily 9/21/2021 at am     dorzolamide-timolol PF (COSOPT) 22.3-6.8 MG/ML opthalmic solution Place 1 drop Into the left eye 2 times daily 9/21/2021 at pm     gemfibroziL (LOPID) 600 MG tablet [GEMFIBROZIL (LOPID) 600 MG TABLET] Take 600 mg by mouth 2 (two) times a day before meals. 9/21/2021 at pm     latanoprost (XALATAN) 0.005 % ophthalmic solution Place 1 drop into both eyes At Bedtime  9/21/2021 at hs     omeprazole (PRILOSEC) 20 MG DR capsule Take 20 mg by mouth daily 9/21/2021 at Unknown time     psyllium (METAMUCIL/KONSYL) Packet Take 1 packet by mouth 2 times daily 9/21/2021 at pm     sodium bicarbonate 650 MG tablet Take 1,300 mg by mouth 2 times daily 9/21/2021 at pm       Information source(s): Family member and CareEverywhere/[facility]  Method of interview communication: phone    Summary of Changes to PTA Med List  New: diltiazem ER, metmucil, dorzolamide/timolol, omeprazole, sodium bicarb  Discontinued: diltiazem 60mg  Changed: citalopram, latanoprost    Patient was asked about OTC/herbal products specifically.  PTA med list reflects this.    In the past week, patient estimated taking medication this percent of the time:  greater than 90%.    Allergies were reviewed, assessed, and updated with the patient.      Patient did not bring any medications to the hospital and can't retrieve from home. No multi-dose medications are available for use during hospital stay.     The information provided in this note is only as accurate as the sources available at the time of the  update(s).    Thank you for the opportunity to participate in the care of this patient.    SARAH SCHMITZ [facility]  9/22/2021 7:46 AM

## 2021-09-22 NOTE — CONSULTS
"Care Management Initial Consult    General Information  Assessment completed with: Annia Gavin daughter via phone  Type of CM/SW Visit: Initial Assessment    Primary Care Provider verified and updated as needed: Yes   Readmission within the last 30 days: no previous admission in last 30 days      Reason for Consult: discharge planning  Advance Care Planning: Advance Care Planning Reviewed: other (comment) (\"does not have\")          Communication Assessment  Patient's communication style: spoken language (English or Bilingual)    Hearing Difficulty or Deaf: no   Wear Glasses or Blind: yes    Cognitive  Cognitive/Neuro/Behavioral: orientation        Orientation: disoriented to, time, situation (Patient is displaying confused, restless behavior. She pulls her covers off frequently and sticks her legs through the side rails of her bed. She seems unaware to call for help. She does know that she is in the hospital.  Responds to simple commands. )             Living Environment:   People in home: alone     Current living Arrangements: apartment, independent living facility (\"[address]+ apartment building\")      Able to return to prior arrangements: yes       Family/Social Support:  Care provided by: self  Provides care for: no one     Children          Description of Support System: Supportive, Involved    Support Assessment: Adequate family and caregiver support, Adequate social supports, Patient communicates needs well met    Current Resources:   Patient receiving home care services: No     Community Resources: Housekeeping/Chore Agency  Equipment currently used at home: walker, standard (\"just started using walker 9/21/21\")  Supplies currently used at home: Other (\"glasses for distance\")    Employment/Financial:  Employment Status: retired     Employment/ Comments: \"no  history\"  Financial Concerns:     Referral to Financial Counselor: No       Lifestyle & Psychosocial Needs:  Social Determinants of " Health     Tobacco Use:      Smoking Tobacco Use:      Smokeless Tobacco Use:    Alcohol Use:      Frequency of Alcohol Consumption:      Average Number of Drinks:      Frequency of Binge Drinking:    Financial Resource Strain:      Difficulty of Paying Living Expenses:    Food Insecurity:      Worried About Running Out of Food in the Last Year:      Ran Out of Food in the Last Year:    Transportation Needs:      Lack of Transportation (Medical):      Lack of Transportation (Non-Medical):    Physical Activity:      Days of Exercise per Week:      Minutes of Exercise per Session:    Stress:      Feeling of Stress :    Social Connections:      Frequency of Communication with Friends and Family:      Frequency of Social Gatherings with Friends and Family:      Attends Lutheran Services:      Active Member of Clubs or Organizations:      Attends Club or Organization Meetings:      Marital Status:    Intimate Partner Violence:      Fear of Current or Ex-Partner:      Emotionally Abused:      Physically Abused:      Sexually Abused:    Depression:      PHQ-2 Score:    Housing Stability:      Unable to Pay for Housing in the Last Year:      Number of Places Lived in the Last Year:      Unstable Housing in the Last Year:        Functional Status:  Prior to admission patient needed assistance:   Dependent ADLs:: Ambulation-walker  Dependent IADLs:: Transportation, Cleaning  Assesssment of Functional Status: Not at baseline with ADL Functioning, Not at baseline with mobility, Not at  functional baseline    Mental Health Status:          Chemical Dependency Status:                Values/Beliefs:  Spiritual, Cultural Beliefs, Lutheran Practices, Values that affect care:                 Additional Information:  Holli lives in an apartment alone. It is a 55+ building, but she does not have services there. She is independent with ADLs at baseline. She does get some housekeeping help.    Unknown discharge needs at this time.  Family is hoping she will be able to return home with no help or home care help PRN instead of a TCU. But if TCU is needed they are open to that also.    Contacts in this order:  Annia Osullivan daughter [phone].  Wilmer Richardson son [phone].  Deepa Topete daughter [phone].    Ruby Whatley RN

## 2021-09-22 NOTE — H&P
ADMISSION HISTORY & PHYSICAL    CODE STATUS:  No Order    Everardo English, [phone]    Patient YOB: 1935  Admit date: 9/22/2021  Date of Service: 9/22/2021    ASSESSMENT AND PLAN:  85 year old female presenting with:    Shortness of breath/orthopnea:  -Although chest xray shows no congestion, BNP is elevated. I suspect this is due to some CHF. She also has a elvin large hiatal hernia which could be contributing to her symptoms  -Stop IVF  -BNP of 478 noted. No prior labs for comparison.  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 This is likely due to 2 doses of ativan she got in ED. Will avoid further ativan. Contribution from hyponatremia is likely minimal as her sodium is 122. Follows simple commands. Will get CT head. Checking ABG, Ammonia.     CKD-IV:  -Creatinine is at baseline  -Monitor. Avoid nephrotoxic meds    Hypertension:  -Cont home meds with hold parameters    Hyperlipidemia:  -Cont with home meds    Disposition:  -Anticipated Length of Stay in midnights and medical necessity (including a midnight in the Emergency Department after triage if applicable): >2      CHIEF COMPLAINT:  Lightheadedness, fatigue and shortness of breath    HISTORY OF PRESENTING ILLNESS:  Patient is a 85 year old female with PMH significant for hypertension and hyperlipidemia who presented to our ED for evaluation of lightheaded, fatigue and shortness of breath.    Patient was very lethargic during my visit. She was restless last night and was given 2 doses of ativan per nursing staff. Per report she was evaluated at [facility] on 9/4/21 for similar symptoms where she was given some IVF and sent home to follow up with PCP. The patient has been experiencing more dizziness, lightheadedness, weakness and shortness of breath  for the last 1 wk. Reports worsening exertional dyspnea. Denies any chest pain or leg swelling. Denies any cough, fever or chills.     PMH/PSH:  Patient Active Problem List   Diagnosis     Stage 4 chronic kidney disease (H)     Hyperlipidemia     Moderate major depression, single episode (H)     Rib fracture     Senile osteoporosis     Hyponatremia     KAM (acute kidney injury) (H)     Acute on chronic congestive heart failure, unspecified heart failure type (H)       History reviewed. No pertinent past medical history.     History reviewed. No pertinent surgical history.       ALLERGIES:  Allergies   Allergen Reactions     Statins-Hmg-Coa Reductase Inhibitors [Hmg-Coa-R Inhibitors] Muscle Pain (Myalgia)       MEDICATIONS:  Reviewed.  Current Facility-Administered Medications   Medication     acetaminophen (TYLENOL) tablet 650 mg    Or     acetaminophen (TYLENOL) Suppository 650 mg     bisacodyl (DULCOLAX) Suppository 10 mg     lidocaine (LMX4) cream     lidocaine 1 % 0.1-1 mL     melatonin tablet 1 mg     nitroGLYcerin (NITRODUR) 0.4 MG/HR 24 hr patch 1 patch     ondansetron (ZOFRAN-ODT) ODT tab 4 mg    Or     ondansetron (ZOFRAN) injection 4 mg     senna-docusate (SENOKOT-S/PERICOLACE) 8.6-50 MG per tablet 1 tablet    Or     senna-docusate (SENOKOT-S/PERICOLACE) 8.6-50 MG per tablet 2 tablet     sodium chloride (PF) 0.9% PF flush 3 mL     sodium chloride (PF) 0.9% PF flush 3 mL     Current Outpatient Medications   Medication     citalopram (CELEXA) 20 MG tablet     gemfibroziL (LOPID) 600 MG tablet     latanoprost (XALATAN) 0.005 % ophthalmic solution       Current Facility-Administered Medications:      acetaminophen (TYLENOL) tablet 650 mg, 650 mg, Oral, Q6H PRN **OR** acetaminophen (TYLENOL) Suppository 650 mg, 650 mg, Rectal, Q6H PRN, Danilo Anne MD     bisacodyl (DULCOLAX) Suppository 10 mg, 10 mg, Rectal, Daily PRN, Danilo Anne MD     lidocaine (LMX4) cream, , Topical, Q1H PRN, Danilo Anne  MD     lidocaine 1 % 0.1-1 mL, 0.1-1 mL, Other, Q1H PRN, Danilo Anne MD     melatonin tablet 1 mg, 1 mg, Oral, At Bedtime PRN, Danilo Anne MD     nitroGLYcerin (NITRODUR) 0.4 MG/HR 24 hr patch 1 patch, 1 patch, Transdermal, Once, Dany Beckett MD, 1 patch at 09/22/21 0248     ondansetron (ZOFRAN-ODT) ODT tab 4 mg, 4 mg, Oral, Q6H PRN **OR** ondansetron (ZOFRAN) injection 4 mg, 4 mg, Intravenous, Q6H PRN, Danilo Anne MD     senna-docusate (SENOKOT-S/PERICOLACE) 8.6-50 MG per tablet 1 tablet, 1 tablet, Oral, BID PRN **OR** senna-docusate (SENOKOT-S/PERICOLACE) 8.6-50 MG per tablet 2 tablet, 2 tablet, Oral, BID PRN, Danilo Anne MD     sodium chloride (PF) 0.9% PF flush 3 mL, 3 mL, Intracatheter, Q8H, Danilo Anne MD     sodium chloride (PF) 0.9% PF flush 3 mL, 3 mL, Intracatheter, q1 min prn, Danilo Anne MD    Current Outpatient Medications:      citalopram (CELEXA) 20 MG tablet, Take 20 mg by mouth daily , Disp: , Rfl:      gemfibroziL (LOPID) 600 MG tablet, [GEMFIBROZIL (LOPID) 600 MG TABLET] Take 600 mg by mouth 2 (two) times a day before meals., Disp: , Rfl:      latanoprost (XALATAN) 0.005 % ophthalmic solution, Place 1 drop into both eyes At Bedtime , Disp: , Rfl:    Scheduled Meds:    nitroGLYcerin  1 patch Transdermal Once     sodium chloride (PF)  3 mL Intracatheter Q8H     Continuous Infusions:  PRN Meds:.acetaminophen **OR** acetaminophen, bisacodyl, lidocaine 4%, lidocaine (buffered or not buffered), melatonin, ondansetron **OR** ondansetron, senna-docusate **OR** senna-docusate, sodium chloride (PF)    SOCIAL HISTORY:  Social History     Socioeconomic History     Marital status:      Spouse name: Not on file     Number of children: Not on file     Years of education: Not on file     Highest education level: Not on file   Occupational History     Not on file   Tobacco Use     Smoking status: Not on file   Substance and Sexual Activity     Alcohol use: Not on file      "Drug use: Not on file     Sexual activity: Not on file   Other Topics Concern     Not on file   Social History Narrative     Not on file     Social Determinants of Health     Financial Resource Strain:      Difficulty of Paying Living Expenses:    Food Insecurity:      Worried About Running Out of Food in the Last Year:      Ran Out of Food in the Last Year:    Transportation Needs:      Lack of Transportation (Medical):      Lack of Transportation (Non-Medical):    Physical Activity:      Days of Exercise per Week:      Minutes of Exercise per Session:    Stress:      Feeling of Stress :    Social Connections:      Frequency of Communication with Friends and Family:      Frequency of Social Gatherings with Friends and Family:      Attends Taoist Services:      Active Member of Clubs or Organizations:      Attends Club or Organization Meetings:      Marital Status:    Intimate Partner Violence:      Fear of Current or Ex-Partner:      Emotionally Abused:      Physically Abused:      Sexually Abused:        FAMILY HISTORY:  History reviewed. No pertinent family history.     ROS:  Review of systems is unobtainable because of patient's mental status.        PHYSICAL EXAM:  GENRL:  Not in acute distress. Very lethargic  BP (!) 156/67   Pulse 70   Temp 97.8  F (36.6  C)   Resp 17   Ht 1.6 m (5' 3\")   Wt 50.3 kg (111 lb)   SpO2 96%   BMI 19.66 kg/m    No intake/output data recorded.  No intake/output data recorded.  HEENT: NC/AT      Eyes-  Pupil round and reactive to light bilaterally       Neck- supple, no JVP elevation,       Sclera- anicteric      Oropharynx- moist and pink  CHEST: Clear to auscultation bilateral anteriorly, no ronchi or wheezing  HEART: S1S2 normal, regular. Loud systolic and diastolic heart murmur noted  ABDMN: Soft. Non-tender, non-distended.  No guarding or rigidity. Bowel sounds- active  EXTRM: No pedal edema   INTGM: No skin rash, no cyanosis or clubbing  MUSCULOSKELETAL: no joint " tenderness or swelling on upper and lower extremities  NEURO: Lethargic. Hard to wake her up. Follows simple commands when awake.      DIAGNOSTIC DATA:    Recent Labs   Lab 09/22/21 0224   WBC 10.1   HGB 12.2   HCT 34.9*          Recent Labs   Lab 09/22/21 0224   *   CO2 22   BUN 28       No results for input(s): INR in the last 168 hours.    Recent Labs   Lab 09/22/21 0224   *   CO2 22   BUN 28       [unfilled]    XR Chest Port 1 View    Result Date: 9/22/2021  EXAM: XR CHEST PORT 1 VIEW LOCATION: [facility] DATE/TIME: 9/22/2021 3:49 AM INDICATION: SOB. COMPARISON: 9/4/2021.     IMPRESSION: Normal heart size and pulmonary vascularity. No acute appearing infiltrates or consolidation. Very large hiatal hernia. Aortic calcification. Degenerative changes both shoulders and multiple old left rib fractures. Remainder unremarkable with  no significant change from the prior study.      Patient's new lab studies reviewed personally.  Patient's new radiology reports reviewed personally.    Note created using dragon voice recognition software.  Errors in spelling or words which seems out of context are unintentional.  Sounds alike errors may have escaped editing.     09/22/2021  Behzad Elkins MD  HOSPITALIST, [facility]  PAGER NO. [phone]

## 2021-09-22 NOTE — PROCEDURES
"PICC Line Insertion Procedure Note    Pt. Name: Holli Richardson  MRN:        [id number]    Procedure: Insertion of a  DUAL Lumen  5 fr  Bard SOLO (valved) Power PICC, Lot number [id number]    Indications: 3% NaCl    Contraindications : None    Procedure Details   Patient identified with 2 identifiers and \"Time Out\" conducted.  .     Central line insertion bundle followed: hand hygiene performed prior to procedure, site cleansed with cholraprep, hat, mask, sterile gloves,sterile gown worn, patient draped with maximum barrier head to toe drape, sterile field maintained.    The vein was assessed and found to be compressible and of adequate size. 1.5 ml 1% Lidocaine administered sq to the insertion site. A 5 Fr PICC was inserted into the BASILIC vein of the right arm with ultrasound guidance. ONE attempt(s) required to access vein.   Catheter threaded without difficulty. Good blood return noted.    Modified Seldinger Technique used for insertion.    The 8 sharps that are included in the PICC insertion kit were accounted for and disposed of in the sharps container prior to breakdown of the sterile field.    Catheter secured with Statlock, biopatch and Tegaderm dressing applied.    Findings:  Total catheter length 38 cm, with 4 cm exposed. Mid upper arm circumference is 26 cm. Catheter was flushed with 20 cc NS. Patient  tolerated procedure well.    Tip placement verified in the distal SVC by [employer]:      CLABSI prevention brochure left at bedside.    Patient's primary RN notified PICC is ready for use.    Comments:          Danilo Navarro, RN, MSN, RCIS, VA-BC  Vascular Access - [facility]        "

## 2021-09-23 LAB
ANION GAP SERPL CALCULATED.3IONS-SCNC: 7 MMOL/L (ref 5–18)
BASOPHILS # BLD AUTO: 0.1 10E3/UL (ref 0–0.2)
BASOPHILS NFR BLD AUTO: 1 %
BUN SERPL-MCNC: 30 MG/DL (ref 8–28)
CALCIUM SERPL-MCNC: 9.4 MG/DL (ref 8.5–10.5)
CHLORIDE BLD-SCNC: 96 MMOL/L (ref 98–107)
CO2 SERPL-SCNC: 23 MMOL/L (ref 22–31)
CREAT SERPL-MCNC: 1.48 MG/DL (ref 0.6–1.1)
EOSINOPHIL # BLD AUTO: 0.2 10E3/UL (ref 0–0.7)
EOSINOPHIL NFR BLD AUTO: 2 %
ERYTHROCYTE [DISTWIDTH] IN BLOOD BY AUTOMATED COUNT: 12.9 % (ref 10–15)
GFR SERPL CREATININE-BSD FRML MDRD: 33 ML/MIN/1.73M2
GLUCOSE BLD-MCNC: 107 MG/DL (ref 70–125)
HCT VFR BLD AUTO: 30.9 % (ref 35–47)
HGB BLD-MCNC: 10.7 G/DL (ref 11.7–15.7)
IMM GRANULOCYTES # BLD: 0.1 10E3/UL
IMM GRANULOCYTES NFR BLD: 1 %
LYMPHOCYTES # BLD AUTO: 1.6 10E3/UL (ref 0.8–5.3)
LYMPHOCYTES NFR BLD AUTO: 20 %
Lab: NORMAL
MCH RBC QN AUTO: 30.4 PG (ref 26.5–33)
MCHC RBC AUTO-ENTMCNC: 34.6 G/DL (ref 31.5–36.5)
MCV RBC AUTO: 88 FL (ref 78–100)
MONOCYTES # BLD AUTO: 1 10E3/UL (ref 0–1.3)
MONOCYTES NFR BLD AUTO: 12 %
NEUTROPHILS # BLD AUTO: 5.2 10E3/UL (ref 1.6–8.3)
NEUTROPHILS NFR BLD AUTO: 64 %
NRBC # BLD AUTO: 0 10E3/UL
NRBC BLD AUTO-RTO: 0 /100
PERFORMING LABORATORY: NORMAL
PLATELET # BLD AUTO: 327 10E3/UL (ref 150–450)
POTASSIUM BLD-SCNC: 3.5 MMOL/L (ref 3.5–5)
RBC # BLD AUTO: 3.52 10E6/UL (ref 3.8–5.2)
SODIUM SERPL-SCNC: 126 MMOL/L (ref 136–145)
SODIUM SERPL-SCNC: 128 MMOL/L (ref 136–145)
SODIUM SERPL-SCNC: 129 MMOL/L (ref 136–145)
SPECIMEN STATUS: NORMAL
TEST NAME: NORMAL
TOTAL PROTEIN SERUM FOR ELP: 5.4 G/DL (ref 6–8)
TROPONIN I SERPL-MCNC: 0.06 NG/ML (ref 0–0.29)
WBC # BLD AUTO: 8 10E3/UL (ref 4–11)

## 2021-09-23 PROCEDURE — 84295 ASSAY OF SERUM SODIUM: CPT | Performed by: INTERNAL MEDICINE

## 2021-09-23 PROCEDURE — 250N000011 HC RX IP 250 OP 636: Performed by: INTERNAL MEDICINE

## 2021-09-23 PROCEDURE — 84484 ASSAY OF TROPONIN QUANT: CPT | Performed by: INTERNAL MEDICINE

## 2021-09-23 PROCEDURE — 99233 SBSQ HOSP IP/OBS HIGH 50: CPT | Performed by: INTERNAL MEDICINE

## 2021-09-23 PROCEDURE — 84295 ASSAY OF SERUM SODIUM: CPT | Performed by: STUDENT IN AN ORGANIZED HEALTH CARE EDUCATION/TRAINING PROGRAM

## 2021-09-23 PROCEDURE — 84155 ASSAY OF PROTEIN SERUM: CPT | Performed by: INTERNAL MEDICINE

## 2021-09-23 PROCEDURE — 85025 COMPLETE CBC W/AUTO DIFF WBC: CPT | Performed by: STUDENT IN AN ORGANIZED HEALTH CARE EDUCATION/TRAINING PROGRAM

## 2021-09-23 PROCEDURE — 83883 ASSAY NEPHELOMETRY NOT SPEC: CPT | Performed by: INTERNAL MEDICINE

## 2021-09-23 PROCEDURE — 250N000013 HC RX MED GY IP 250 OP 250 PS 637: Performed by: STUDENT IN AN ORGANIZED HEALTH CARE EDUCATION/TRAINING PROGRAM

## 2021-09-23 PROCEDURE — 36415 COLL VENOUS BLD VENIPUNCTURE: CPT | Performed by: INTERNAL MEDICINE

## 2021-09-23 PROCEDURE — 250N000013 HC RX MED GY IP 250 OP 250 PS 637: Performed by: INTERNAL MEDICINE

## 2021-09-23 PROCEDURE — 86038 ANTINUCLEAR ANTIBODIES: CPT | Performed by: INTERNAL MEDICINE

## 2021-09-23 PROCEDURE — 84165 PROTEIN E-PHORESIS SERUM: CPT | Mod: TC | Performed by: INTERNAL MEDICINE

## 2021-09-23 PROCEDURE — 210N000001 HC R&B IMCU HEART CARE

## 2021-09-23 RX ORDER — HEPARIN SODIUM 5000 [USP'U]/.5ML
5000 INJECTION, SOLUTION INTRAVENOUS; SUBCUTANEOUS EVERY 8 HOURS
Status: DISCONTINUED | OUTPATIENT
Start: 2021-09-23 | End: 2021-09-28 | Stop reason: HOSPADM

## 2021-09-23 RX ORDER — TOLVAPTAN 15 MG/1
15 TABLET ORAL DAILY
Status: COMPLETED | OUTPATIENT
Start: 2021-09-23 | End: 2021-09-23

## 2021-09-23 RX ORDER — SODIUM BICARBONATE 650 MG/1
650 TABLET ORAL 2 TIMES DAILY
Status: DISCONTINUED | OUTPATIENT
Start: 2021-09-23 | End: 2021-09-28 | Stop reason: HOSPADM

## 2021-09-23 RX ADMIN — GEMFIBROZIL 600 MG: 600 TABLET ORAL at 16:59

## 2021-09-23 RX ADMIN — SODIUM BICARBONATE 1300 MG: 648 TABLET ORAL at 08:22

## 2021-09-23 RX ADMIN — DORZOLAMIDE HYDROCHLORIDE AND TIMOLOL MALEATE 1 DROP: 20; 5 SOLUTION/ DROPS OPHTHALMIC at 08:25

## 2021-09-23 RX ADMIN — Medication 1 PACKET: at 20:04

## 2021-09-23 RX ADMIN — Medication 1 PACKET: at 08:26

## 2021-09-23 RX ADMIN — HEPARIN SODIUM 5000 UNITS: 5000 INJECTION, SOLUTION INTRAVENOUS; SUBCUTANEOUS at 20:04

## 2021-09-23 RX ADMIN — TOLVAPTAN 15 MG: 15 TABLET ORAL at 14:16

## 2021-09-23 RX ADMIN — DILTIAZEM HYDROCHLORIDE 240 MG: 120 CAPSULE, EXTENDED RELEASE ORAL at 08:23

## 2021-09-23 RX ADMIN — LATANOPROST 1 DROP: 50 SOLUTION OPHTHALMIC at 22:58

## 2021-09-23 RX ADMIN — Medication 1 MG: at 23:56

## 2021-09-23 RX ADMIN — GEMFIBROZIL 600 MG: 600 TABLET ORAL at 08:22

## 2021-09-23 RX ADMIN — OMEPRAZOLE 20 MG: 20 CAPSULE, DELAYED RELEASE ORAL at 08:22

## 2021-09-23 RX ADMIN — DORZOLAMIDE HYDROCHLORIDE AND TIMOLOL MALEATE 1 DROP: 20; 5 SOLUTION/ DROPS OPHTHALMIC at 20:05

## 2021-09-23 RX ADMIN — CITALOPRAM HYDROBROMIDE 10 MG: 10 TABLET ORAL at 08:25

## 2021-09-23 RX ADMIN — SODIUM BICARBONATE 650 MG: 648 TABLET ORAL at 20:04

## 2021-09-23 ASSESSMENT — MIFFLIN-ST. JEOR: SCORE: 944.86

## 2021-09-23 NOTE — PLAN OF CARE
Problem: Adult Inpatient Plan of Care  Goal: Plan of Care Review  Outcome: Improving     Problem: Adult Inpatient Plan of Care  Goal: Optimal Comfort and Wellbeing  Outcome: Improving     Problem: Adult Inpatient Plan of Care  Goal: Optimal Comfort and Wellbeing  Outcome: Improving   VSS. Patient doing better this shift, calls appropriately. Sodium level at 128, recheck in the morning.

## 2021-09-23 NOTE — PLAN OF CARE
Problem: Adult Inpatient Plan of Care  Goal: Optimal Comfort and Wellbeing  Outcome: Improving     Problem: Risk for Delirium  Goal: Optimal Coping  Outcome: Improving     Problem: Adult Inpatient Plan of Care  Goal: Patient-Specific Goal (Individualized)  Outcome: Improving   VSS. Patient encouraged to use call light, call light in reach. Sodium level at 124 around 1500. Continue to monitor

## 2021-09-23 NOTE — PROGRESS NOTES
"RENAL PROGRESS NOTE - [facility]        CC: f/u hyponatremia, CKD 4    Subjective: feels dizzy today, denies sob, slightly confused, says her daughter makes her drink lots of fluids at home    Assessment and Plan: 86 yo female with h/o CKD 4, HTN admit with hyponatremia  1. Hyponatremia - sodium 122 on admit, urine studies c/w SIADH. Euvolemic, tsh, cortisol wnl  -s/p hypertonic saline, now sodium 128  -limit po fluid to 1200 ml/d  -will give dose tolvaptan x 1   -repeat sodium in am  -encourage salt intake ad billy, expect low solute intake is contributing to hyponatremia  -if citalopram is helping signif with mental health, would not discontinue  2. CKD 4 - recently eval by Dr Beaver, Left renal atrophy with b/l echogenic kidneys on us.   5.8 gm proteinuria  - holli, pla2r, myeloma eval pending.  3. HTN - ok control, no med changes  4. Hypercalcemia - now resolved, cont to avoid vit d, calcium supplements, myeloma eval pending  5. Metabolic acidosis - due to CKD  -will decrease bicarb to 650 mg bid to prevent overcorrection    Jamaica Rodriguez MD  [facility]  [phone]    Objective    PHYSICAL EXAM  /57 (BP Location: Left arm)   Pulse 64   Temp 98.1  F (36.7  C) (Oral)   Resp 16   Ht 1.6 m (5' 3\")   Wt 52.1 kg (114 lb 12.8 oz)   SpO2 93%   BMI 20.34 kg/m    I/O last 3 completed shifts:  In: 930 [P.O.:930]  Out: 1130 [Urine:1130]  Wt Readings from Last 3 Encounters:   09/23/21 52.1 kg (114 lb 12.8 oz)       GENERAL: nad  HEENT:normocephalic, atraumatic  CARDIOVASCULAR: rr, no rub,  No edema  PULMONARY:cta ant. No cyanosis  GASTROINTESTINAL: soft, nt/nd  MSK: diffuse muscle atrophy, warm  NEURO: Alert, no gross focal findings  PSYCHIATRIC: Adequate mood and interaction  SKIN: Pale, no jaundice, no rash.    LABORATORIES  Recent Labs   Lab 09/23/21  0624 09/22/21  0224   WBC 8.0 10.1   HGB 10.7* 12.2   HCT 30.9* 34.9*    372     Recent Labs   Lab 09/23/21  1103 " 09/23/21  0624 09/23/21  0240 09/22/21  0952 09/22/21  0224   * 126*  126* 126*   < > 122*   CO2  --  23  --   --  22   BUN  --  30*  --   --  28    < > = values in this interval not displayed.         MEDICATIONS    citalopram  10 mg Oral Daily     diltiazem ER  240 mg Oral Daily     dorzolamide-timolol PF  1 drop Left Eye BID     gemfibrozil  600 mg Oral BID AC     latanoprost  1 drop Both Eyes At Bedtime     omeprazole  20 mg Oral Daily     psyllium  1 packet Oral BID     sodium bicarbonate  1,300 mg Oral BID     sodium chloride (PF)  3 mL Intracatheter Q8H         Jamaica Rodriguez MD  [facility]  [phone]

## 2021-09-23 NOTE — PLAN OF CARE
Pt is alert and oriented x3-4, patient forgets occasionally why were doing certain things in the hospital and needs reminders of how to use call light. Pts Sodium trending up. Troponins negative.  Slight increase in ST elevation, asymptomatic and negative troponins.     Plan is to have echo. Tele is NSR to NSR w/first degree av block.

## 2021-09-23 NOTE — PROGRESS NOTES
Daily Progress Note        CODE STATUS:  Full Code    09/23/21  Assessment/Plan:     Shortness of breath/orthopnea:  -Although chest xray shows no congestion, BNP is elevated. I suspect this is due to some CHF. She also has a elvin large hiatal hernia which could be contributing to her symptoms  -Stopped IVF  -BNP of 478 noted. No prior labs for comparison.  Checked echo- normal EF, mild LVOT; and trops- negative     Hyponatremia:  -Appreciate nephrology consult  -Urine indices c/w SIADH. Decreased Celexa.  -Treated with hypertonic saline yesterday. Tolvaptan x 1 today per nephrology. Fluid restriction.     Altered mental status:  -Patient was very lethargic when I saw her in ED. This is likely due to 2 doses of ativan she got in ED. Will avoid further ativan.   -CT head- negative. Patient is alert and oriented x 3 today     CKD-IV:  -Creatinine is at baseline  -Monitor. Avoid nephrotoxic meds     Hypertension:  -Cont home meds with hold parameters     Hyperlipidemia:  -Cont with home meds      Disposition; Likely tomorrow  Barrier to discharge; hyponatremia     LOS: 1 day     Subjective:  Interval History: Patient seen and examined. Notes, labs, imaging reports personally reviewed. Patient reports doing good, asks when she can go home.     Review of Systems:   As mentioned in subjective.    Patient Active Problem List   Diagnosis     Stage 4 chronic kidney disease (H)     Hyperlipidemia     Moderate major depression, single episode (H)     Rib fracture     Senile osteoporosis     Hyponatremia     KAM (acute kidney injury) (H)     Acute on chronic congestive heart failure, unspecified heart failure type (H)       Scheduled Meds:    citalopram  10 mg Oral Daily     diltiazem ER  240 mg Oral Daily     dorzolamide-timolol PF  1 drop Left Eye BID     gemfibrozil  600 mg Oral BID AC     heparin ANTICOAGULANT  5,000 Units Subcutaneous Q8H     latanoprost  1 drop Both Eyes At Bedtime     omeprazole  20 mg Oral Daily      "psyllium  1 packet Oral BID     sodium bicarbonate  650 mg Oral BID     sodium chloride (PF)  3 mL Intracatheter Q8H     Continuous Infusions:    [Held by provider] sodium chloride 3%       PRN Meds:.acetaminophen **OR** acetaminophen, bisacodyl, lidocaine 4%, lidocaine (buffered or not buffered), melatonin, ondansetron **OR** ondansetron, senna-docusate **OR** senna-docusate, sodium chloride (PF)    Objective:  Vital signs in last 24 hours:  Temp:  [97.2  F (36.2  C)-98.1  F (36.7  C)] 97.9  F (36.6  C)  Pulse:  [58-71] 71  Resp:  [16-20] 20  BP: (108-162)/(57-87) 108/59  SpO2:  [92 %-96 %] 92 %  Weight:   [unfilled]      Intake/Output Summary (Last 24 hours) at 9/23/2021 1623  Last data filed at 9/23/2021 1200  Gross per 24 hour   Intake 700 ml   Output 1225 ml   Net -525 ml       Physical Exam:  /59 (BP Location: Left arm)   Pulse 71   Temp 97.9  F (36.6  C) (Oral)   Resp 20   Ht 1.6 m (5' 3\")   Wt 52.1 kg (114 lb 12.8 oz)   SpO2 92%   BMI 20.34 kg/m    HEENT: NC/AT                 Eyes-  Pupil round and reactive to light bilaterally                  Neck- supple, no JVP elevation,                  Sclera- anicteric                 Oropharynx- moist and pink  CHEST: Clear to auscultation bilateral anteriorly, no ronchi or wheezing  HEART: S1S2 normal, regular. Loud systolic and diastolic heart murmur noted  ABDMN: Soft. Non-tender, non-distended.  No guarding or rigidity. Bowel sounds- active  EXTRM: No pedal edema   INTGM: No skin rash, no cyanosis or clubbing  MUSCULOSKELETAL: no joint tenderness or swelling on upper and lower extremities  NEURO: Non-focal. Alert and oriented x 3    Lab Results:(I have personally reviewed the results)    Recent Results (from the past 24 hour(s))   Sodium    Collection Time: 09/22/21  7:46 PM   Result Value Ref Range    Sodium 126 (L) 136 - 145 mmol/L   Troponin I    Collection Time: 09/22/21  7:46 PM   Result Value Ref Range    Troponin I 0.04 0.00 - 0.29 ng/mL "   Sodium    Collection Time: 09/22/21 10:41 PM   Result Value Ref Range    Sodium 127 (L) 136 - 145 mmol/L   Troponin I    Collection Time: 09/23/21 12:22 AM   Result Value Ref Range    Troponin I 0.06 0.00 - 0.29 ng/mL   Sodium    Collection Time: 09/23/21  2:40 AM   Result Value Ref Range    Sodium 126 (L) 136 - 145 mmol/L   Basic metabolic panel    Collection Time: 09/23/21  6:24 AM   Result Value Ref Range    Sodium 126 (L) 136 - 145 mmol/L    Potassium 3.5 3.5 - 5.0 mmol/L    Chloride 96 (L) 98 - 107 mmol/L    Carbon Dioxide (CO2) 23 22 - 31 mmol/L    Anion Gap 7 5 - 18 mmol/L    Urea Nitrogen 30 (H) 8 - 28 mg/dL    Creatinine 1.48 (H) 0.60 - 1.10 mg/dL    Calcium 9.4 8.5 - 10.5 mg/dL    Glucose 107 70 - 125 mg/dL    GFR Estimate 33 (L) >60 mL/min/1.73m2   Sodium    Collection Time: 09/23/21  6:24 AM   Result Value Ref Range    Sodium 126 (L) 136 - 145 mmol/L   CBC with platelets and differential    Collection Time: 09/23/21  6:24 AM   Result Value Ref Range    WBC Count 8.0 4.0 - 11.0 10e3/uL    RBC Count 3.52 (L) 3.80 - 5.20 10e6/uL    Hemoglobin 10.7 (L) 11.7 - 15.7 g/dL    Hematocrit 30.9 (L) 35.0 - 47.0 %    MCV 88 78 - 100 fL    MCH 30.4 26.5 - 33.0 pg    MCHC 34.6 31.5 - 36.5 g/dL    RDW 12.9 10.0 - 15.0 %    Platelet Count 327 150 - 450 10e3/uL    % Neutrophils 64 %    % Lymphocytes 20 %    % Monocytes 12 %    % Eosinophils 2 %    % Basophils 1 %    % Immature Granulocytes 1 %    NRBCs per 100 WBC 0 <1 /100    Absolute Neutrophils 5.2 1.6 - 8.3 10e3/uL    Absolute Lymphocytes 1.6 0.8 - 5.3 10e3/uL    Absolute Monocytes 1.0 0.0 - 1.3 10e3/uL    Absolute Eosinophils 0.2 0.0 - 0.7 10e3/uL    Absolute Basophils 0.1 0.0 - 0.2 10e3/uL    Absolute Immature Granulocytes 0.1 (H) <=0.0 10e3/uL    Absolute NRBCs 0.0 10e3/uL   Sodium    Collection Time: 09/23/21 11:03 AM   Result Value Ref Range    Sodium 128 (L) 136 - 145 mmol/L       All laboratory and imaging data in the past 24 hours reviewed  Serum Glucose  range:   Recent Labs   Lab 21  0624 21  0224    113     ABG:   Recent Labs   Lab 21  1047   PH 7.42   PCO2 34*   PO2 76   HCO3 22*     CBC:   Recent Labs   Lab 21  06214   WBC 8.0 10.1   HGB 10.7* 12.2   HCT 30.9* 34.9*   MCV 88 88    372   NEUTROPHIL 64  --    LYMPH 20  --    MONOCYTE 12  --    EOSINOPHIL 2  --      Chemistry:   Recent Labs   Lab 21  1103 21  0624 21  0240 21  1946 21  1500 21  0952 214   * 126*  126* 126*   < > 124*   < > 122*   POTASSIUM  --  3.5  --   --   --   --  4.1   CHLORIDE  --  96*  --   --   --   --  90*   CO2  --  23  --   --   --   --  22   BUN  --  30*  --   --   --   --  28   CR  --  1.48*  --   --   --   --  1.48*   GFRESTIMATED  --  33*  --   --   --   --  33*   GISELE  --  9.4  --   --   --   --  9.9   SANDRINE  --   --   --   --  22  --   --     < > = values in this interval not displayed.     Coags:  No results for input(s): INR, PROTIME, PTT in the last 168 hours.    Invalid input(s): APTT  Cardiac Markers:  Recent Labs   Lab 21  0022   TROPONINI 0.06          Echocardiogram Complete    Result Date: 2021  [id number]^PERRY^BROOK^[place] [address]  Name: MAXWELL TREJO MRN: [id number] : 1935 Study Date: 2021 11:45 AM Age: 85 yrs Gender: Female Patient Location: [facility] Reason For Study: Cardiomyopathy Ordering Physician: BROOK AMADOR Performed By: SAGAR  BSA: 1.5 m2 Height: 63 in Weight: 111 lb HR: 65 BP: 126/68 mmHg ______________________________________________________________________________ Procedure Technically difficult study. ______________________________________________________________________________ Interpretation Summary  There is moderate concentric left ventricular hypertrophy. The visual ejection fraction is 60-65%. A mild left ventricular outflow tract (LVOT)  obstruction is present. No regional wall motion abnormalities noted. Normal right ventricle size and systolic function. There is mild (1+) mitral regurgitation. There is mild trileaflet aortic sclerosis. ______________________________________________________________________________ Left Ventricle The left ventricle is normal in size. There is moderate concentric left ventricular hypertrophy. The visual ejection fraction is 60-65%. Diastolic Doppler findings (E/E' ratio and/or other parameters) suggest left ventricular filling pressures are increased. Grade I or early diastolic dysfunction. A mild left ventricular outflow tract (LVOT) obstruction is present. No regional wall motion abnormalities noted.  Right Ventricle Normal right ventricle size and systolic function.  Atria The left atrium is mildly dilated. Right atrial size is normal. Intact atrial septum.  Mitral Valve There is mild mitral annular calcification. The mitral valve leaflets appear thickened, but open well. There is mild (1+) mitral regurgitation.  Tricuspid Valve Tricuspid valve leaflets appear normal. There is no evidence of tricuspid stenosis or clinically significant tricuspid regurgitation.  Aortic Valve There is mild trileaflet aortic sclerosis. No aortic regurgitation is present.  Pulmonic Valve The pulmonic valve is normal in structure and function.  Vessels The aorta root is normal. Normal size ascending aorta.  Pericardium There is no pericardial effusion.  ______________________________________________________________________________ MMode/2D Measurements & Calculations RVDd: 3.1 cm IVSd: 1.6 cm LVIDd: 3.6 cm LVIDs: 2.5 cm LVPWd: 1.3 cm FS: 29.0 %  LV mass(C)d: 187.7 grams LV mass(C)dI: 124.6 grams/m2 Ao root diam: 2.8 cm LA dimension: 2.5 cm asc Aorta Diam: 3.1 cm LA/Ao: 0.86 LVOT diam: 2.0 cm LVOT area: 3.2 cm2 RWT: 0.74  Doppler Measurements & Calculations MV E max robin: 87.7 cm/sec MV A max robin: 93.8 cm/sec MV E/A: 0.94 MV max P.5  mmHg MV mean P.3 mmHg MV V2 VTI: 37.2 cm MV dec slope: 260.8 cm/sec2 MV dec time: 0.35 sec Ao V2 max: 272.2 cm/sec Ao max P.0 mmHg Ao V2 mean: 254.9 cm/sec Ao mean P.9 mmHg Ao V2 VTI: 82.6 cm PA acc time: 0.11 sec E/E' av.3 Lateral E/e': 22.4 Medial E/e': 32.2  ______________________________________________________________________________ Report approved by: Amador Peña 2021 12:57 PM       US Renal Complete    Result Date: 2021  EXAM: US RENAL COMPLETE LOCATION: [facility] DATE/TIME: 2021 6:02 PM INDICATION: CKD. COMPARISON: 2008. TECHNIQUE: Routine Bilateral Renal and Bladder Ultrasound. FINDINGS: RIGHT KIDNEY: 9.0 x 3.5 x 4.8 cm. Echogenic cortex. Mild cortical thinning. No hydronephrosis. 1.1 x 1.0 cm indeterminate lesion lower pole. LEFT KIDNEY: 6.2 x 2.9 x 3.2 cm. Echogenic cortex. Mild cortical thinning. 3 indeterminate lesions, largest 1.5 x 1.2 x 1.4 cm at the upper pole. Nonobstructive stone lower pole. BLADDER: Unremarkable. Nonvisualization left ureteral jet.     IMPRESSION: 1.  Echogenic kidneys consistent with medical renal disease. Cortical atrophy left greater than right. 2.  Indeterminate bilateral renal masses. Dedicated enhanced renal CT would be needed to exclude neoplasm.    XR Chest Port 1 View    Result Date: 2021  EXAM: XR CHEST PORT 1 VIEW LOCATION: [facility] DATE/TIME: 2021 3:49 AM INDICATION: SOB. COMPARISON: 2021.     IMPRESSION: Normal heart size and pulmonary vascularity. No acute appearing infiltrates or consolidation. Very large hiatal hernia. Aortic calcification. Degenerative changes both shoulders and multiple old left rib fractures. Remainder unremarkable with  no significant change from the prior study.    CT Head w/o Contrast    Result Date: 2021  EXAM: CT HEAD WITHOUT CONTRAST LOCATION: [facility] DATE/TIME: 2021, 10:54 AM  INDICATION: Mental status change, confusion, unknown cause. COMPARISON: None. TECHNIQUE: Routine CT Head without IV contrast. Multiplanar reformats. Dose reduction techniques were used. FINDINGS: INTRACRANIAL CONTENTS: No intracranial hemorrhage, extra-axial collection, or mass effect.  No CT evidence of acute infarct. Mild to moderate presumed chronic small vessel ischemic changes. Mild to moderate generalized volume loss. No hydrocephalus. VISUALIZED ORBITS/SINUSES/MASTOIDS: No intraorbital abnormality. No paranasal sinus mucosal disease. No middle ear or mastoid effusion. BONES/SOFT TISSUES: No acute abnormality.     IMPRESSION: 1.  No acute intracranial process. 2.  Mild to moderate chronic small vessel ischemic disease and generalized brain parenchymal volume loss.       Latest radiology report personally reviewed.    Note created using dragon voice recognition software so sounds alike errors may have escaped editing.      09/23/2021   Behzad Desai MD  Hospitalist, [facility]  Pager: [phone]

## 2021-09-24 ENCOUNTER — APPOINTMENT (OUTPATIENT)
Dept: PHYSICAL THERAPY | Facility: HOSPITAL | Age: 86
DRG: 644 | End: 2021-09-24
Attending: INTERNAL MEDICINE
Payer: COMMERCIAL

## 2021-09-24 ENCOUNTER — APPOINTMENT (OUTPATIENT)
Dept: OCCUPATIONAL THERAPY | Facility: HOSPITAL | Age: 86
DRG: 644 | End: 2021-09-24
Attending: INTERNAL MEDICINE
Payer: COMMERCIAL

## 2021-09-24 LAB
ANA SER QL IF: NEGATIVE
ANION GAP SERPL CALCULATED.3IONS-SCNC: 8 MMOL/L (ref 5–18)
BUN SERPL-MCNC: 32 MG/DL (ref 8–28)
CALCIUM SERPL-MCNC: 9.9 MG/DL (ref 8.5–10.5)
CHLORIDE BLD-SCNC: 103 MMOL/L (ref 98–107)
CO2 SERPL-SCNC: 24 MMOL/L (ref 22–31)
CREAT SERPL-MCNC: 1.58 MG/DL (ref 0.6–1.1)
GFR SERPL CREATININE-BSD FRML MDRD: 30 ML/MIN/1.73M2
GLUCOSE BLD-MCNC: 113 MG/DL (ref 70–125)
KAPPA LC FREE SER-MCNC: 3.57 MG/DL (ref 0.33–1.94)
KAPPA LC FREE/LAMBDA FREE SER NEPH: 2.32 {RATIO} (ref 0.26–1.65)
LAMBDA LC FREE SERPL-MCNC: 1.54 MG/DL (ref 0.57–2.63)
POTASSIUM BLD-SCNC: 3.5 MMOL/L (ref 3.5–5)
SODIUM SERPL-SCNC: 135 MMOL/L (ref 136–145)

## 2021-09-24 PROCEDURE — 250N000013 HC RX MED GY IP 250 OP 250 PS 637: Performed by: INTERNAL MEDICINE

## 2021-09-24 PROCEDURE — 99233 SBSQ HOSP IP/OBS HIGH 50: CPT | Performed by: INTERNAL MEDICINE

## 2021-09-24 PROCEDURE — 97166 OT EVAL MOD COMPLEX 45 MIN: CPT | Mod: GO

## 2021-09-24 PROCEDURE — 84166 PROTEIN E-PHORESIS/URINE/CSF: CPT | Mod: TC | Performed by: INTERNAL MEDICINE

## 2021-09-24 PROCEDURE — 210N000001 HC R&B IMCU HEART CARE

## 2021-09-24 PROCEDURE — 97530 THERAPEUTIC ACTIVITIES: CPT | Mod: GP

## 2021-09-24 PROCEDURE — 250N000011 HC RX IP 250 OP 636: Performed by: INTERNAL MEDICINE

## 2021-09-24 PROCEDURE — 250N000011 HC RX IP 250 OP 636: Performed by: STUDENT IN AN ORGANIZED HEALTH CARE EDUCATION/TRAINING PROGRAM

## 2021-09-24 PROCEDURE — 97535 SELF CARE MNGMENT TRAINING: CPT | Mod: GO

## 2021-09-24 PROCEDURE — 80048 BASIC METABOLIC PNL TOTAL CA: CPT | Performed by: INTERNAL MEDICINE

## 2021-09-24 PROCEDURE — 97161 PT EVAL LOW COMPLEX 20 MIN: CPT | Mod: GP

## 2021-09-24 PROCEDURE — 97129 THER IVNTJ 1ST 15 MIN: CPT | Mod: GO

## 2021-09-24 PROCEDURE — 97116 GAIT TRAINING THERAPY: CPT | Mod: GP

## 2021-09-24 PROCEDURE — 250N000013 HC RX MED GY IP 250 OP 250 PS 637: Performed by: STUDENT IN AN ORGANIZED HEALTH CARE EDUCATION/TRAINING PROGRAM

## 2021-09-24 RX ORDER — PANTOPRAZOLE SODIUM 20 MG/1
40 TABLET, DELAYED RELEASE ORAL
Status: DISCONTINUED | OUTPATIENT
Start: 2021-09-25 | End: 2021-09-28 | Stop reason: HOSPADM

## 2021-09-24 RX ADMIN — ONDANSETRON 4 MG: 2 INJECTION INTRAMUSCULAR; INTRAVENOUS at 20:14

## 2021-09-24 RX ADMIN — Medication 1 PACKET: at 08:29

## 2021-09-24 RX ADMIN — Medication 1 MG: at 22:54

## 2021-09-24 RX ADMIN — DILTIAZEM HYDROCHLORIDE 240 MG: 120 CAPSULE, EXTENDED RELEASE ORAL at 08:29

## 2021-09-24 RX ADMIN — BISACODYL SUPPOSITORY 10 MG: 10 SUPPOSITORY RECTAL at 08:53

## 2021-09-24 RX ADMIN — GEMFIBROZIL 600 MG: 600 TABLET ORAL at 08:29

## 2021-09-24 RX ADMIN — DORZOLAMIDE HYDROCHLORIDE AND TIMOLOL MALEATE 1 DROP: 20; 5 SOLUTION/ DROPS OPHTHALMIC at 20:14

## 2021-09-24 RX ADMIN — LATANOPROST 1 DROP: 50 SOLUTION OPHTHALMIC at 22:20

## 2021-09-24 RX ADMIN — SODIUM BICARBONATE 650 MG: 648 TABLET ORAL at 20:14

## 2021-09-24 RX ADMIN — SODIUM BICARBONATE 650 MG: 648 TABLET ORAL at 08:29

## 2021-09-24 RX ADMIN — HEPARIN SODIUM 5000 UNITS: 5000 INJECTION, SOLUTION INTRAVENOUS; SUBCUTANEOUS at 16:27

## 2021-09-24 RX ADMIN — HEPARIN SODIUM 5000 UNITS: 5000 INJECTION, SOLUTION INTRAVENOUS; SUBCUTANEOUS at 08:37

## 2021-09-24 RX ADMIN — Medication 1 PACKET: at 20:18

## 2021-09-24 RX ADMIN — HEPARIN SODIUM 5000 UNITS: 5000 INJECTION, SOLUTION INTRAVENOUS; SUBCUTANEOUS at 00:17

## 2021-09-24 RX ADMIN — GEMFIBROZIL 600 MG: 600 TABLET ORAL at 16:27

## 2021-09-24 RX ADMIN — DORZOLAMIDE HYDROCHLORIDE AND TIMOLOL MALEATE 1 DROP: 20; 5 SOLUTION/ DROPS OPHTHALMIC at 08:30

## 2021-09-24 RX ADMIN — CITALOPRAM HYDROBROMIDE 10 MG: 10 TABLET ORAL at 08:30

## 2021-09-24 RX ADMIN — OMEPRAZOLE 20 MG: 20 CAPSULE, DELAYED RELEASE ORAL at 08:29

## 2021-09-24 ASSESSMENT — ACTIVITIES OF DAILY LIVING (ADL): PREVIOUS_RESPONSIBILITIES: MEAL PREP;HOUSEKEEPING;LAUNDRY;SHOPPING;MEDICATION MANAGEMENT;FINANCES

## 2021-09-24 ASSESSMENT — MIFFLIN-ST. JEOR: SCORE: 992.03

## 2021-09-24 NOTE — PLAN OF CARE
Problem: Adult Inpatient Plan of Care  Goal: Patient-Specific Goal (Individualized)  Outcome: Improving     Problem: Adult Inpatient Plan of Care  Goal: Optimal Comfort and Wellbeing  Outcome: Improving   Pt comfortable over noc. Pt hopes to increase activity today. Follow Na. Falls precautions in place.

## 2021-09-24 NOTE — PLAN OF CARE
Problem: Adult Inpatient Plan of Care  Goal: Plan of Care Review  9/24/2021 1400 by Herlinda Butt RN  Outcome: Improving  9/24/2021 1400 by Herlinda Butt RN  Outcome: Improving     Problem: Adjustment to Illness (Heart Failure)  Goal: Optimal Coping  Outcome: Improving     Problem: Fluid Imbalance (Heart Failure)  Goal: Fluid Balance  Outcome: Improving     Problem: Functional Ability Impaired (Heart Failure)  Goal: Optimal Functional Ability  Outcome: Improving     Problem: Respiratory Compromise (Heart Failure)  Goal: Effective Oxygenation and Ventilation  Outcome: Improving     Problem: Adult Inpatient Plan of Care  Goal: Absence of Hospital-Acquired Illness or Injury  Intervention: Identify and Manage Fall Risk  Recent Flowsheet Documentation  Taken 9/24/2021 1249 by Herlinda Butt RN  Safety Promotion/Fall Prevention:   activity supervised   bed alarm on   room door open  Taken 9/24/2021 0833 by Herlinda Butt RN  Safety Promotion/Fall Prevention:   activity supervised   bed alarm on   room door open   Patient is alert and able to let her needs be known. Denies any pain when asked. Denies any shortness of breath. VSS and on room air. SR with first degree block on the monitor. Fluid restriction in place, pt educated on this. Had PRN suppository, with results. OT/PT to assess today, awaiting recommendations. Daughter Annia was updated today. Will continue to monitor.

## 2021-09-24 NOTE — PROGRESS NOTES
Care Management Follow Up    Length of Stay (days): 2    Expected Discharge Date:  09/27/2021     Concerns to be Addressed: TCU placement, [facility]          Anticipated Discharge Disposition:  TCU     Anticipated Discharge Services:  TCU    Patient/family educated on Medicare website which has current facility and service quality ratings:  Yes    Education Provided on the Discharge Plan:  Yes    Patient/Family in Agreement with the Plan:  yes         Additional Information:    Discharge Planning update:    Spoke with daughter-Annia regarding OT recommendation for TCU. PT recommending home. Annia states she does not feel patient is at her baseline. Patient has been staying with family this past week due to increased weakness and not able to manage alone. Annia would like TCU to see if patient can get stronger to return to her senior living apartment.  List of [facility] TCUs emailed to Annia as she states she would like to review with patient and her sister. Requesting 4 preferences.     [employer] [facility] faxed.       Aria Osorio RN

## 2021-09-24 NOTE — PROGRESS NOTES
09/24/21 1400   Quick Adds   Type of Visit Initial PT Evaluation   Living Environment   People in home alone   Current Living Arrangements apartment  (senior I apt)   Transportation Anticipated car, drives self   Living Environment Comments I, cleaning service 1x month, sets up own meds   Self-Care   Usual Activity Tolerance good   Current Activity Tolerance fair   Equipment Currently Used at Home   (family reports possibly a FWW to borrow, otherwise none)   Disability/Function   Hearing Difficulty or Deaf no   Number of times patient has fallen within last six months 1  ([employer] Day weekend fell and broke wrist)   General Information   Onset of Illness/Injury or Date of Surgery 09/22/21   Referring Physician Behzad Desai MBBS   Patient/Family Therapy Goals Statement (PT) return home   Pertinent History of Current Problem (include personal factors and/or comorbidities that impact the POC) low complexity   Existing Precautions/Restrictions no known precautions/restrictions   General Observations patient somewhat slow to answer questions, but agreeable to PT and appreciative of help and care.   Cognition   Orientation Status (Cognition) oriented to;person;place  (disoriented to date)   Affect/Mental Status (Cognition) WFL   Follows Commands (Cognition) WFL   Cognitive Status Comments patient appreciative of PT, 2 family members visiting   Pain Assessment   Patient Currently in Pain No   Range of Motion (ROM)   ROM Comment BLE WFL   Strength   Strength Comments BLE grossly decreased 4/5   Gait/Stairs (Locomotion)   Distance in Feet (Required for LE Total Joints) 150   Sensory Examination   Sensory Perception Comments BLE intact   Clinical Impression   Criteria for Skilled Therapeutic Intervention yes, treatment indicated   PT Diagnosis (PT) impaired functional mobility   Influenced by the following impairments weakness, decreased mobility   Functional limitations due to impairments gait   Clinical  Presentation Stable/Uncomplicated   Clinical Presentation Rationale patient presents as medically diagnosed   Clinical Decision Making (Complexity) low complexity   Therapy Frequency (PT) Daily   Predicted Duration of Therapy Intervention (days/wks) 3 days   Planned Therapy Interventions (PT) gait training   Anticipated Equipment Needs at Discharge (PT) walker, rolling  (may have a borrowed one at home? unclear)   Risk & Benefits of therapy have been explained patient   PT Discharge Planning    PT Discharge Recommendation (DC Rec) home with assist;home with home care physical therapy  (senior apt with home PT or return home with family)   PT Rationale for DC Rec Patient has been staying at daughter's house lately. Recommend home to senior apt with home PT or home with family. Recommend use of FWW at home.   Total Evaluation Time   Total Evaluation Time (Minutes) 15

## 2021-09-24 NOTE — PROGRESS NOTES
Care Management Follow Up    Length of Stay (days): 2    Expected Discharge Date: 09/25/2021     Concerns to be Addressed:     Medical MGMT  Patient plan of care discussed at interdisciplinary rounds: Yes    Anticipated Discharge Disposition:  Home w/HC vs. TCU     Anticipated Discharge Services:  REC is Home w/HC vs. TCU  Anticipated Discharge DME:      Patient/family educated on Medicare website which has current facility and service quality ratings:  ongoing  Education Provided on the Discharge Plan:  ongoing  Patient/Family in Agreement with the Plan:  ongoing    Referrals Placed by CM/SW:    Private pay costs discussed: Not applicable    Additional Information:  CM to discuss TCU preferences with pt/daughter.    Assessment hx: Holli lives in an apartment alone. It is a 55+ building, but she does not have services there. She is independent with ADLs at baseline. She does get some housekeeping help.  Family is hoping she will be able to return home with no help or home care help PRN instead of a TCU. But if TCU is needed they are open to that also. Per therapy, pt stated she has been living with her daughter since june 2021 in a split level home. Per MD, pt/daughter ok with TCU.    OUMOU Urias

## 2021-09-24 NOTE — PROGRESS NOTES
"RENAL PROGRESS NOTE - [facility]        CC: f/u hyponatremia, CKD 4    Subjective: denies dizziness today, is constipated, we discussed improvement in sodium and stable renal function, she says she is \"not ready\" for discharge.    Assessment and Plan: 86 yo female with h/o CKD 4, HTN admit with hyponatremia  1. Hyponatremia - sodium 122 on admit, urine studies c/w SIADH. Euvolemic, tsh, cortisol wnl  -s/p hypertonic saline, tolvapatan x 1  -limit po fluid to 1200 ml/d on discharge  -encourage salt intake ad billy, expect low solute intake is contributing to hyponatremia  -if citalopram is helping signif with mental health, would not discontinue  -would repeat sodium within a week and if falling, add salt tabs 2 gm bid with lasix 20 mg/d    2. CKD 4 - recently eval by Dr Beaver, Left renal atrophy with b/l echogenic kidneys on us.   5.8 gm proteinuria  - holli, pla2r, myeloma eval pending.    3. HTN - ok control, will tolerate mild htn in this elderly, frail lady  -no med changes    4. Hypercalcemia - now resolved, cont to avoid vit d, calcium supplements, myeloma eval pending    5. Metabolic acidosis - due to CKD  -cont sodium bicarb  650 mg bid     Jamaica Rodriguez MD  [facility]  [phone]    Objective    PHYSICAL EXAM  BP (!) 163/75 (BP Location: Left arm)   Pulse 63   Temp 97.5  F (36.4  C) (Oral)   Resp 18   Ht 1.6 m (5' 3\")   Wt 56.8 kg (125 lb 3.2 oz)   SpO2 92%   BMI 22.18 kg/m    I/O last 3 completed shifts:  In: 460 [P.O.:460]  Out: 1375 [Urine:1375]  Wt Readings from Last 3 Encounters:   09/24/21 56.8 kg (125 lb 3.2 oz)       GENERAL: nad  HEENT:normocephalic, atraumatic  CARDIOVASCULAR: rr, no rub,  No edema  PULMONARY:cta ant. No cyanosis  GASTROINTESTINAL: soft, nt/nd  MSK: diffuse muscle atrophy, warm  NEURO: Alert, no gross focal findings  PSYCHIATRIC: Adequate mood and interaction  SKIN: Pale, no jaundice, no rash.    LABORATORIES  Recent Labs   Lab 09/23/21  0666 " 09/22/21 0224   WBC 8.0 10.1   HGB 10.7* 12.2   HCT 30.9* 34.9*    372     Recent Labs   Lab 09/24/21  0649 09/23/21 2008 09/23/21  1103 09/23/21 0624 09/23/21 0624 09/22/21 0952 09/22/21 0224   * 129* 128*   < > 126*  126*   < > 122*   CO2 24  --   --   --  23  --  22   BUN 32*  --   --   --  30*  --  28    < > = values in this interval not displayed.         MEDICATIONS    citalopram  10 mg Oral Daily     diltiazem ER  240 mg Oral Daily     dorzolamide-timolol PF  1 drop Left Eye BID     gemfibrozil  600 mg Oral BID AC     heparin ANTICOAGULANT  5,000 Units Subcutaneous Q8H     latanoprost  1 drop Both Eyes At Bedtime     omeprazole  20 mg Oral Daily     psyllium  1 packet Oral BID     sodium bicarbonate  650 mg Oral BID     sodium chloride (PF)  3 mL Intracatheter Q8H         Jamaica Rodriguez MD  [facility]  [phone]

## 2021-09-24 NOTE — PLAN OF CARE
Problem: Adult Inpatient Plan of Care  Goal: Absence of Hospital-Acquired Illness or Injury  Intervention: Identify and Manage Fall Risk  Recent Flowsheet Documentation  Taken 9/23/2021 1703 by Katie Yu RN  Safety Promotion/Fall Prevention:    bed alarm on    chair alarm on    clutter free environment maintained    room door open    nonskid shoes/slippers when out of bed    mobility aid in reach    lighting adjusted    room organization consistent    safety round/check completed     Problem: Adult Inpatient Plan of Care  Goal: Absence of Hospital-Acquired Illness or Injury  Intervention: Prevent Skin Injury  Recent Flowsheet Documentation  Taken 9/23/2021 1830 by Katie Yu RN  Body Position: supine     Telemetry rhythm is Sinus Rhythm, then SR with 1st degree AVB with inverted T wave, Md notified, patient asymptomatic.      Patient is forgetful, denies pain.  She was up to the chair with assist of one.  Sodium level was 129, will continue to monitor.

## 2021-09-24 NOTE — PROGRESS NOTES
09/24/21 1315   Quick Adds   Type of Visit Initial Occupational Therapy Evaluation   Living Environment   People in home alone   Current Living Arrangements apartment   Home Accessibility no concerns   Transportation Anticipated car, drives self   Living Environment Comments pt stated she has been living with her daughter since june 2021 in a split level home/was quite vague about timeline with her abilities to care for self       Self-Care   Usual Activity Tolerance good   Current Activity Tolerance poor   Instrumental Activities of Daily Living (IADL)   Previous Responsibilities meal prep;housekeeping;laundry;shopping;medication management;finances   IADL Comments when pt lived in her apt/up until june this year   Disability/Function   Hearing Difficulty or Deaf no   Wear Glasses or Blind yes   Concentrating, Remembering or Making Decisions Difficulty yes   Doing Errands Independently Difficulty (such as shopping) no   General Information   Onset of Illness/Injury or Date of Surgery 09/22/21   Referring Physician benji gaffney   Patient/Family Therapy Goal Statement (OT) none   Existing Precautions/Restrictions no known precautions/restrictions   General Observations and Info pt unsteady with transfers/decreased cognition   Cognitive Status Examination   Orientation Status person;place   Range of Motion Comprehensive   General Range of Motion no range of motion deficits identified   Bed Mobility   Bed Mobility supine-sit   Supine-Sit [place] (Bed Mobility) supervision   Assistive Device (Bed Mobility) bed rails   Transfers   Transfers bed-chair transfer;sit-stand transfer;toilet transfer   Transfer Skill: Bed to Chair/Chair to Bed   Bed-Chair [place] (Transfers) minimum assist (75% patient effort)   Transfer Comments hand held assist   Sit-Stand Transfer   Sit-Stand [place] (Transfers) minimum assist (75% patient effort)   Toilet Transfer   Type (Toilet Transfer) sit-stand;stand-sit    [place] Level (Toilet Transfer) minimum assist (75% patient effort)   Assistive Device (Toilet Transfer) grab bars/safety frame   Toileting   [place] Level (Toileting) contact guard assist   Position (Toileting) supported standing   Clinical Impression   Criteria for Skilled Therapeutic Interventions Met (OT) yes   OT Diagnosis decreased adl's/cognition   OT Problem List-Impairments impacting ADL activity tolerance impaired;balance;cognition;mobility;strength   Assessment of Occupational Performance 1-3 Performance Deficits   Planned Therapy Interventions (OT) ADL retraining;balance training;cognition;transfer training   Clinical Decision Making Complexity (OT) low complexity   Therapy Frequency (OT) Daily   Predicted Duration of Therapy 4 days   Anticipated Equipment Needs Upon Discharge (OT) walker, rolling   Risk & Benefits of therapy have been explained evaluation/treatment results reviewed   OT Discharge Planning    OT Discharge Recommendation (DC Rec) Transitional Care Facility;home with home care occupational therapy   OT Rationale for DC Rec needs assist with adl's/decreasedcognition/if home, will need 24 hour supervision, assist with transfers to toilet, bed, chair, meals , lauandry, will need a walker   Total Evaluation Time (Minutes)   Total Evaluation Time (Minutes) 18

## 2021-09-24 NOTE — PROGRESS NOTES
"Daily Progress Note        CODE STATUS:  Full Code    09/23/21  Assessment/Plan:     Shortness of breath/orthopnea:  -Although chest xray shows no congestion, BNP is elevated. I suspect this is due to some CHF. She also has a elvin large hiatal hernia which could be contributing to her symptoms  -Stopped IVF  -BNP of 478 noted. No prior labs for comparison.  Checked echo- normal EF, mild LVOT; and trops- negative     Hyponatremia:  -Appreciate nephrology consult  -Urine indices c/w SIADH. Decreased Celexa.  -Treated with hypertonic saline 9/22. Tolvaptan x 1 on 9/23 per nephrology  -Cont fluid restriction. Sodium has improved to 139 today.     Altered mental status:  -Patient was very lethargic when I saw her in ED. This is likely due to 2 doses of ativan she got in ED. Will avoid further ativan.   -CT head- negative. Patient is alert and oriented x 3 today     CKD-IV:  -Creatinine is at baseline  -Monitor. Avoid nephrotoxic meds     Hypertension:  -Cont home meds with hold parameters     Hyperlipidemia:  -Cont with home meds      Disposition; Likely tomorrow- TCU.  Barrier to discharge; hyponatremia     LOS: 1 day     Case discussed with patient's daughter, Awilda Jaimes couple of times, explained what SIADH is. Patient lives by herself in an apartment. Holli as well as Awilda Jaimes concerned about her safety going back to the apartment. They are open to TCU placement    Subjective:  Interval History: Patient seen and examined. Patient reports doing better, but still feels very weak. Admits her walking is \"not good\".      Review of Systems:   As mentioned in subjective.    Patient Active Problem List   Diagnosis     Stage 4 chronic kidney disease (H)     Hyperlipidemia     Moderate major depression, single episode (H)     Rib fracture     Senile osteoporosis     Hyponatremia     KAM (acute kidney injury) (H)     Acute on chronic congestive heart failure, unspecified heart failure type (H)       Scheduled Meds:    citalopram " " 10 mg Oral Daily     diltiazem ER  240 mg Oral Daily     dorzolamide-timolol PF  1 drop Left Eye BID     gemfibrozil  600 mg Oral BID AC     heparin ANTICOAGULANT  5,000 Units Subcutaneous Q8H     latanoprost  1 drop Both Eyes At Bedtime     omeprazole  20 mg Oral Daily     psyllium  1 packet Oral BID     sodium bicarbonate  650 mg Oral BID     sodium chloride (PF)  3 mL Intracatheter Q8H     Continuous Infusions:    [Held by provider] sodium chloride 3%       PRN Meds:.acetaminophen **OR** acetaminophen, bisacodyl, lidocaine 4%, lidocaine (buffered or not buffered), melatonin, ondansetron **OR** ondansetron, senna-docusate **OR** senna-docusate, sodium chloride (PF)    Objective:  Vital signs in last 24 hours:  Temp:  [97.5  F (36.4  C)-98.2  F (36.8  C)] 97.5  F (36.4  C)  Pulse:  [53-71] 63  Resp:  [16-20] 18  BP: (108-163)/(54-75) 163/75  SpO2:  [92 %-95 %] 92 %  Weight:   [unfilled]      Intake/Output Summary (Last 24 hours) at 9/23/2021 1623  Last data filed at 9/23/2021 1200  Gross per 24 hour   Intake 700 ml   Output 1225 ml   Net -525 ml       Physical Exam:  BP (!) 163/75 (BP Location: Left arm)   Pulse 63   Temp 97.5  F (36.4  C) (Oral)   Resp 18   Ht 1.6 m (5' 3\")   Wt 56.8 kg (125 lb 3.2 oz)   SpO2 92%   BMI 22.18 kg/m    HEENT: NC/AT                 Eyes-  Pupil round and reactive to light bilaterally                  Neck- supple, no JVP elevation,                  Sclera- anicteric                 Oropharynx- moist and pink  CHEST: Clear to auscultation bilateral anteriorly, no ronchi or wheezing  HEART: S1S2 normal, regular. Loud systolic and diastolic heart murmur noted  ABDMN: Soft. Non-tender, non-distended.  No guarding or rigidity. Bowel sounds- active  EXTRM: No pedal edema   INTGM: No skin rash, no cyanosis or clubbing  MUSCULOSKELETAL: no joint tenderness or swelling on upper and lower extremities  NEURO: Non-focal. Alert and oriented x 3    Lab Results:(I have personally reviewed " the results)    Recent Results (from the past 24 hour(s))   Sodium    Collection Time: 09/23/21 11:03 AM   Result Value Ref Range    Sodium 128 (L) 136 - 145 mmol/L   Sodium    Collection Time: 09/23/21  8:08 PM   Result Value Ref Range    Sodium 129 (L) 136 - 145 mmol/L   Basic metabolic panel    Collection Time: 09/24/21  6:49 AM   Result Value Ref Range    Sodium 135 (L) 136 - 145 mmol/L    Potassium 3.5 3.5 - 5.0 mmol/L    Chloride 103 98 - 107 mmol/L    Carbon Dioxide (CO2) 24 22 - 31 mmol/L    Anion Gap 8 5 - 18 mmol/L    Urea Nitrogen 32 (H) 8 - 28 mg/dL    Creatinine 1.58 (H) 0.60 - 1.10 mg/dL    Calcium 9.9 8.5 - 10.5 mg/dL    Glucose 113 70 - 125 mg/dL    GFR Estimate 30 (L) >60 mL/min/1.73m2       All laboratory and imaging data in the past 24 hours reviewed  Serum Glucose range:   Recent Labs   Lab 09/24/21  0649 09/23/21 0624 09/22/21 0224    107 113     ABG:   Recent Labs   Lab 09/22/21  1047   PH 7.42   PCO2 34*   PO2 76   HCO3 22*     CBC:   Recent Labs   Lab 09/23/21 0624 09/22/21 0224   WBC 8.0 10.1   HGB 10.7* 12.2   HCT 30.9* 34.9*   MCV 88 88    372   NEUTROPHIL 64  --    LYMPH 20  --    MONOCYTE 12  --    EOSINOPHIL 2  --      Chemistry:   Recent Labs   Lab 09/24/21  0649 09/23/21 2008 09/23/21  1103 09/23/21 0624 09/23/21 0624 09/22/21  1946 09/22/21  1500 09/22/21 0952 09/22/21 0224   * 129* 128*   < > 126*  126*   < > 124*   < > 122*   POTASSIUM 3.5  --   --   --  3.5  --   --   --  4.1   CHLORIDE 103  --   --   --  96*  --   --   --  90*   CO2 24  --   --   --  23  --   --   --  22   BUN 32*  --   --   --  30*  --   --   --  28   CR 1.58*  --   --   --  1.48*  --   --   --  1.48*   GFRESTIMATED 30*  --   --   --  33*  --   --   --  33*   GISELE 9.9  --   --   --  9.4  --   --   --  9.9   SANDRINE  --   --   --   --   --   --  22  --   --     < > = values in this interval not displayed.     Coags:  No results for input(s): INR, PROTIME, PTT in the last 168  hours.    Invalid input(s): APTT  Cardiac Markers:  Recent Labs   Lab 21  0022   TROPONINI 0.06          Echocardiogram Complete    Result Date: 2021  [id number]^PERRY^[place]OOK^[place] [address]  Name: MAXWELL TREJO MRN: [id number] : 1935 Study Date: 2021 11:45 AM Age: 85 yrs Gender: Female Patient Location: [facility] Reason For Study: Cardiomyopathy Ordering Physician: BROOK AMADOR Performed By: SAGAR  BSA: 1.5 m2 Height: 63 in Weight: 111 lb HR: 65 BP: 126/68 mmHg ______________________________________________________________________________ Procedure Technically difficult study. ______________________________________________________________________________ Interpretation Summary  There is moderate concentric left ventricular hypertrophy. The visual ejection fraction is 60-65%. A mild left ventricular outflow tract (LVOT) obstruction is present. No regional wall motion abnormalities noted. Normal right ventricle size and systolic function. There is mild (1+) mitral regurgitation. There is mild trileaflet aortic sclerosis. ______________________________________________________________________________ Left Ventricle The left ventricle is normal in size. There is moderate concentric left ventricular hypertrophy. The visual ejection fraction is 60-65%. Diastolic Doppler findings (E/E' ratio and/or other parameters) suggest left ventricular filling pressures are increased. Grade I or early diastolic dysfunction. A mild left ventricular outflow tract (LVOT) obstruction is present. No regional wall motion abnormalities noted.  Right Ventricle Normal right ventricle size and systolic function.  Atria The left atrium is mildly dilated. Right atrial size is normal. Intact atrial septum.  Mitral Valve There is mild mitral annular calcification. The mitral valve leaflets appear thickened, but open well. There is mild (1+) mitral  regurgitation.  Tricuspid Valve Tricuspid valve leaflets appear normal. There is no evidence of tricuspid stenosis or clinically significant tricuspid regurgitation.  Aortic Valve There is mild trileaflet aortic sclerosis. No aortic regurgitation is present.  Pulmonic Valve The pulmonic valve is normal in structure and function.  Vessels The aorta root is normal. Normal size ascending aorta.  Pericardium There is no pericardial effusion.  ______________________________________________________________________________ MMode/2D Measurements & Calculations RVDd: 3.1 cm IVSd: 1.6 cm LVIDd: 3.6 cm LVIDs: 2.5 cm LVPWd: 1.3 cm FS: 29.0 %  LV mass(C)d: 187.7 grams LV mass(C)dI: 124.6 grams/m2 Ao root diam: 2.8 cm LA dimension: 2.5 cm asc Aorta Diam: 3.1 cm LA/Ao: 0.86 LVOT diam: 2.0 cm LVOT area: 3.2 cm2 RWT: 0.74  Doppler Measurements & Calculations MV E max robin: 87.7 cm/sec MV A max robin: 93.8 cm/sec MV E/A: 0.94 MV max P.5 mmHg MV mean P.3 mmHg MV V2 VTI: 37.2 cm MV dec slope: 260.8 cm/sec2 MV dec time: 0.35 sec Ao V2 max: 272.2 cm/sec Ao max P.0 mmHg Ao V2 mean: 254.9 cm/sec Ao mean P.9 mmHg Ao V2 VTI: 82.6 cm PA acc time: 0.11 sec E/E' av.3 Lateral E/e': 22.4 Medial E/e': 32.2  ______________________________________________________________________________ Report approved by: Amador Peña 2021 12:57 PM       US Renal Complete    Result Date: 2021  EXAM: US RENAL COMPLETE LOCATION: [facility] DATE/TIME: 2021 6:02 PM INDICATION: CKD. COMPARISON: 2008. TECHNIQUE: Routine Bilateral Renal and Bladder Ultrasound. FINDINGS: RIGHT KIDNEY: 9.0 x 3.5 x 4.8 cm. Echogenic cortex. Mild cortical thinning. No hydronephrosis. 1.1 x 1.0 cm indeterminate lesion lower pole. LEFT KIDNEY: 6.2 x 2.9 x 3.2 cm. Echogenic cortex. Mild cortical thinning. 3 indeterminate lesions, largest 1.5 x 1.2 x 1.4 cm at the upper pole. Nonobstructive stone lower pole. BLADDER:  Unremarkable. Nonvisualization left ureteral jet.     IMPRESSION: 1.  Echogenic kidneys consistent with medical renal disease. Cortical atrophy left greater than right. 2.  Indeterminate bilateral renal masses. Dedicated enhanced renal CT would be needed to exclude neoplasm.    XR Chest Port 1 View    Result Date: 9/22/2021  EXAM: XR CHEST PORT 1 VIEW LOCATION: [facility] DATE/TIME: 9/22/2021 3:49 AM INDICATION: SOB. COMPARISON: 9/4/2021.     IMPRESSION: Normal heart size and pulmonary vascularity. No acute appearing infiltrates or consolidation. Very large hiatal hernia. Aortic calcification. Degenerative changes both shoulders and multiple old left rib fractures. Remainder unremarkable with  no significant change from the prior study.    CT Head w/o Contrast    Result Date: 9/22/2021  EXAM: CT HEAD WITHOUT CONTRAST LOCATION: [facility] DATE/TIME: 09/22/2021, 10:54 AM INDICATION: Mental status change, confusion, unknown cause. COMPARISON: None. TECHNIQUE: Routine CT Head without IV contrast. Multiplanar reformats. Dose reduction techniques were used. FINDINGS: INTRACRANIAL CONTENTS: No intracranial hemorrhage, extra-axial collection, or mass effect.  No CT evidence of acute infarct. Mild to moderate presumed chronic small vessel ischemic changes. Mild to moderate generalized volume loss. No hydrocephalus. VISUALIZED ORBITS/SINUSES/MASTOIDS: No intraorbital abnormality. No paranasal sinus mucosal disease. No middle ear or mastoid effusion. BONES/SOFT TISSUES: No acute abnormality.     IMPRESSION: 1.  No acute intracranial process. 2.  Mild to moderate chronic small vessel ischemic disease and generalized brain parenchymal volume loss.       Latest radiology report personally reviewed.    Note created using dragon voice recognition software so sounds alike errors may have escaped editing.      09/24/2021   Behzad Desai MD  Hospitalist, [facility]  Pager:  [phone]

## 2021-09-24 NOTE — PROVIDER NOTIFICATION
Notified House Officer, patient's telemetry rhythm is Sinus Rhythm with 1st degree AVB, inverted T wave, and elevated ST in lead V2.  Patient is asymptomatic.  Sodium level checked and is 129. Will continue to monitor.

## 2021-09-25 ENCOUNTER — APPOINTMENT (OUTPATIENT)
Dept: OCCUPATIONAL THERAPY | Facility: HOSPITAL | Age: 86
DRG: 644 | End: 2021-09-25
Payer: COMMERCIAL

## 2021-09-25 LAB
ANION GAP SERPL CALCULATED.3IONS-SCNC: 7 MMOL/L (ref 5–18)
BUN SERPL-MCNC: 32 MG/DL (ref 8–28)
CALCIUM SERPL-MCNC: 10.3 MG/DL (ref 8.5–10.5)
CHLORIDE BLD-SCNC: 105 MMOL/L (ref 98–107)
CO2 SERPL-SCNC: 27 MMOL/L (ref 22–31)
CREAT SERPL-MCNC: 1.65 MG/DL (ref 0.6–1.1)
GFR SERPL CREATININE-BSD FRML MDRD: 29 ML/MIN/1.73M2
GLUCOSE BLD-MCNC: 130 MG/DL (ref 70–125)
POTASSIUM BLD-SCNC: 4.1 MMOL/L (ref 3.5–5)
SODIUM SERPL-SCNC: 139 MMOL/L (ref 136–145)

## 2021-09-25 PROCEDURE — 250N000013 HC RX MED GY IP 250 OP 250 PS 637: Performed by: INTERNAL MEDICINE

## 2021-09-25 PROCEDURE — 97535 SELF CARE MNGMENT TRAINING: CPT | Mod: GO

## 2021-09-25 PROCEDURE — 250N000011 HC RX IP 250 OP 636: Performed by: INTERNAL MEDICINE

## 2021-09-25 PROCEDURE — 80048 BASIC METABOLIC PNL TOTAL CA: CPT | Performed by: INTERNAL MEDICINE

## 2021-09-25 PROCEDURE — 250N000013 HC RX MED GY IP 250 OP 250 PS 637: Performed by: STUDENT IN AN ORGANIZED HEALTH CARE EDUCATION/TRAINING PROGRAM

## 2021-09-25 PROCEDURE — 99232 SBSQ HOSP IP/OBS MODERATE 35: CPT | Performed by: INTERNAL MEDICINE

## 2021-09-25 PROCEDURE — 210N000001 HC R&B IMCU HEART CARE

## 2021-09-25 RX ORDER — LANOLIN ALCOHOL/MO/W.PET/CERES
3 CREAM (GRAM) TOPICAL AT BEDTIME
Status: DISCONTINUED | OUTPATIENT
Start: 2021-09-25 | End: 2021-09-28 | Stop reason: HOSPADM

## 2021-09-25 RX ORDER — HYDRALAZINE HYDROCHLORIDE 20 MG/ML
10 INJECTION INTRAMUSCULAR; INTRAVENOUS EVERY 6 HOURS PRN
Status: DISCONTINUED | OUTPATIENT
Start: 2021-09-25 | End: 2021-09-28 | Stop reason: HOSPADM

## 2021-09-25 RX ADMIN — HEPARIN SODIUM 5000 UNITS: 5000 INJECTION, SOLUTION INTRAVENOUS; SUBCUTANEOUS at 08:50

## 2021-09-25 RX ADMIN — SODIUM BICARBONATE 650 MG: 648 TABLET ORAL at 20:02

## 2021-09-25 RX ADMIN — DORZOLAMIDE HYDROCHLORIDE AND TIMOLOL MALEATE 1 DROP: 20; 5 SOLUTION/ DROPS OPHTHALMIC at 08:50

## 2021-09-25 RX ADMIN — MELATONIN TAB 3 MG 3 MG: 3 TAB at 20:02

## 2021-09-25 RX ADMIN — HEPARIN SODIUM 5000 UNITS: 5000 INJECTION, SOLUTION INTRAVENOUS; SUBCUTANEOUS at 01:04

## 2021-09-25 RX ADMIN — ACETAMINOPHEN 650 MG: 325 TABLET ORAL at 16:16

## 2021-09-25 RX ADMIN — HEPARIN SODIUM 5000 UNITS: 5000 INJECTION, SOLUTION INTRAVENOUS; SUBCUTANEOUS at 16:17

## 2021-09-25 RX ADMIN — GEMFIBROZIL 600 MG: 600 TABLET ORAL at 06:49

## 2021-09-25 RX ADMIN — DILTIAZEM HYDROCHLORIDE 240 MG: 120 CAPSULE, EXTENDED RELEASE ORAL at 08:50

## 2021-09-25 RX ADMIN — GEMFIBROZIL 600 MG: 600 TABLET ORAL at 16:17

## 2021-09-25 RX ADMIN — Medication 1 PACKET: at 08:50

## 2021-09-25 RX ADMIN — Medication 1 PACKET: at 20:02

## 2021-09-25 RX ADMIN — SODIUM BICARBONATE 650 MG: 648 TABLET ORAL at 08:50

## 2021-09-25 RX ADMIN — PANTOPRAZOLE SODIUM 40 MG: 20 TABLET, DELAYED RELEASE ORAL at 06:49

## 2021-09-25 RX ADMIN — CITALOPRAM HYDROBROMIDE 10 MG: 10 TABLET ORAL at 08:50

## 2021-09-25 ASSESSMENT — MIFFLIN-ST. JEOR: SCORE: 1095.91

## 2021-09-25 NOTE — PROGRESS NOTES
Daily Progress Note        CODE STATUS:  Full Code    09/23/21  Assessment/Plan:     Shortness of breath/orthopnea:  -Although chest xray shows no congestion, BNP is elevated. I suspect this is due to some CHF. She also has a elvin large hiatal hernia which could be contributing to her symptoms  -Stopped IVF  -BNP of 478 noted. No prior labs for comparison.  Checked echo- normal EF, mild LVOT; and trops- negative     Hyponatremia:  -Appreciate nephrology consult  -Urine indices c/w SIADH. Decreased Celexa.  -Treated with hypertonic saline 9/22. Tolvaptan x 1 on 9/23 per nephrology  -Cont fluid restriction. Sodium has improved to 139 today.     Altered mental status:  -Patient was very lethargic when I saw her in ED. This is likely due to 2 doses of ativan she got in ED. Will avoid further ativan.   -CT head- negative. Patient is alert and oriented x 3 today     CKD-IV:  -Creatinine is at baseline  -Monitor. Avoid nephrotoxic meds     Hypertension:  -Cont home meds with hold parameters     Hyperlipidemia:  -Cont with home meds    Insomnia:  -Add melatonin    Disposition; TCU  Barrier to discharge; TCU availability     LOS: 1 day     Subjective:  Interval History: Patient seen and examined. Doing about the same. States she didn't sleep well last night. No other acute issues reported.    Review of Systems:   As mentioned in subjective.    Patient Active Problem List   Diagnosis     Stage 4 chronic kidney disease (H)     Hyperlipidemia     Moderate major depression, single episode (H)     Rib fracture     Senile osteoporosis     Hyponatremia     KAM (acute kidney injury) (H)     Acute on chronic congestive heart failure, unspecified heart failure type (H)       Scheduled Meds:    citalopram  10 mg Oral Daily     diltiazem ER  240 mg Oral Daily     dorzolamide-timolol PF  1 drop Left Eye BID     gemfibrozil  600 mg Oral BID AC     heparin ANTICOAGULANT  5,000 Units Subcutaneous Q8H     latanoprost  1 drop Both Eyes At  "Bedtime     pantoprazole  40 mg Oral QAM AC     psyllium  1 packet Oral BID     sodium bicarbonate  650 mg Oral BID     sodium chloride (PF)  3 mL Intracatheter Q8H     Continuous Infusions:    [Held by provider] sodium chloride 3%       PRN Meds:.acetaminophen **OR** acetaminophen, bisacodyl, hydrALAZINE, lidocaine 4%, lidocaine (buffered or not buffered), melatonin, ondansetron **OR** ondansetron, senna-docusate **OR** senna-docusate, sodium chloride (PF)    Objective:  Vital signs in last 24 hours:  Temp:  [97.6  F (36.4  C)-98.2  F (36.8  C)] 98  F (36.7  C)  Pulse:  [53-70] 66  Resp:  [18-20] 20  BP: (107-189)/(52-81) 117/52  SpO2:  [93 %-96 %] 96 %  Weight:   [unfilled]      Intake/Output Summary (Last 24 hours) at 9/23/2021 1623  Last data filed at 9/23/2021 1200  Gross per 24 hour   Intake 700 ml   Output 1225 ml   Net -525 ml       Physical Exam:  /52 (BP Location: Right arm)   Pulse 66   Temp 98  F (36.7  C) (Oral)   Resp 20   Ht 1.6 m (5' 3\")   Wt 67.2 kg (148 lb 1.6 oz)   SpO2 96%   BMI 26.23 kg/m    HEENT: NC/AT                 Eyes-  Pupil round and reactive to light bilaterally                  Neck- supple, no JVP elevation,                  Sclera- anicteric                 Oropharynx- moist and pink  CHEST: Clear to auscultation bilateral anteriorly, no ronchi or wheezing  HEART: S1S2 normal, regular. Loud systolic and diastolic heart murmur noted  ABDMN: Soft. Non-tender, non-distended.  No guarding or rigidity. Bowel sounds- active  EXTRM: No pedal edema   INTGM: No skin rash, no cyanosis or clubbing  MUSCULOSKELETAL: no joint tenderness or swelling on upper and lower extremities  NEURO: Non-focal. Alert and oriented x 3    Lab Results:(I have personally reviewed the results)    Recent Results (from the past 24 hour(s))   Basic metabolic panel    Collection Time: 09/25/21  6:12 AM   Result Value Ref Range    Sodium 139 136 - 145 mmol/L    Potassium 4.1 3.5 - 5.0 mmol/L    Chloride " 105 98 - 107 mmol/L    Carbon Dioxide (CO2) 27 22 - 31 mmol/L    Anion Gap 7 5 - 18 mmol/L    Urea Nitrogen 32 (H) 8 - 28 mg/dL    Creatinine 1.65 (H) 0.60 - 1.10 mg/dL    Calcium 10.3 8.5 - 10.5 mg/dL    Glucose 130 (H) 70 - 125 mg/dL    GFR Estimate 29 (L) >60 mL/min/1.73m2       All laboratory and imaging data in the past 24 hours reviewed  Serum Glucose range:   Recent Labs   Lab 21   * 113 107 113     ABG:   Recent Labs   Lab 21  1047   PH 7.42   PCO2 34*   PO2 76   HCO3 22*     CBC:   Recent Labs   Lab 21   WBC 8.0 10.1   HGB 10.7* 12.2   HCT 30.9* 34.9*   MCV 88 88    372   NEUTROPHIL 64  --    LYMPH 20  --    MONOCYTE 12  --    EOSINOPHIL 2  --      Chemistry:   Recent Labs   Lab 21  0649 21  1103 21  19421  1500    135* 129*   < > 126*  126*   < > 124*   POTASSIUM 4.1 3.5  --   --  3.5  --   --    CHLORIDE 105 103  --   --  96*  --   --    CO2 27 24  --   --  23  --   --    BUN 32* 32*  --   --  30*  --   --    CR 1.65* 1.58*  --   --  1.48*  --   --    GFRESTIMATED 29* 30*  --   --  33*  --   --    GISELE 10.3 9.9  --   --  9.4  --   --    SANDRINE  --   --   --   --   --   --  22    < > = values in this interval not displayed.     Coags:  No results for input(s): INR, PROTIME, PTT in the last 168 hours.    Invalid input(s): APTT  Cardiac Markers:  Recent Labs   Lab 21  0022   TROPONINI 0.06          Echocardiogram Complete    Result Date: 2021  [id number]^PERRY^[place] [address]  Name: MAXWELL TREJO MRN: [id number] : 1935 Study Date: 2021 11:45 AM Age: 85 yrs Gender: Female Patient Location: [facility] Reason For Study: Cardiomyopathy Ordering Physician: BROOK AMADOR Performed By: SAGAR  BSA: 1.5 m2 Height: 63 in Weight: 111 lb  HR: 65 BP: 126/68 mmHg ______________________________________________________________________________ Procedure Technically difficult study. ______________________________________________________________________________ Interpretation Summary  There is moderate concentric left ventricular hypertrophy. The visual ejection fraction is 60-65%. A mild left ventricular outflow tract (LVOT) obstruction is present. No regional wall motion abnormalities noted. Normal right ventricle size and systolic function. There is mild (1+) mitral regurgitation. There is mild trileaflet aortic sclerosis. ______________________________________________________________________________ Left Ventricle The left ventricle is normal in size. There is moderate concentric left ventricular hypertrophy. The visual ejection fraction is 60-65%. Diastolic Doppler findings (E/E' ratio and/or other parameters) suggest left ventricular filling pressures are increased. Grade I or early diastolic dysfunction. A mild left ventricular outflow tract (LVOT) obstruction is present. No regional wall motion abnormalities noted.  Right Ventricle Normal right ventricle size and systolic function.  Atria The left atrium is mildly dilated. Right atrial size is normal. Intact atrial septum.  Mitral Valve There is mild mitral annular calcification. The mitral valve leaflets appear thickened, but open well. There is mild (1+) mitral regurgitation.  Tricuspid Valve Tricuspid valve leaflets appear normal. There is no evidence of tricuspid stenosis or clinically significant tricuspid regurgitation.  Aortic Valve There is mild trileaflet aortic sclerosis. No aortic regurgitation is present.  Pulmonic Valve The pulmonic valve is normal in structure and function.  Vessels The aorta root is normal. Normal size ascending aorta.  Pericardium There is no pericardial effusion.  ______________________________________________________________________________ MMode/2D Measurements &  Calculations RVDd: 3.1 cm IVSd: 1.6 cm LVIDd: 3.6 cm LVIDs: 2.5 cm LVPWd: 1.3 cm FS: 29.0 %  LV mass(C)d: 187.7 grams LV mass(C)dI: 124.6 grams/m2 Ao root diam: 2.8 cm LA dimension: 2.5 cm asc Aorta Diam: 3.1 cm LA/Ao: 0.86 LVOT diam: 2.0 cm LVOT area: 3.2 cm2 RWT: 0.74  Doppler Measurements & Calculations MV E max robin: 87.7 cm/sec MV A max robin: 93.8 cm/sec MV E/A: 0.94 MV max P.5 mmHg MV mean P.3 mmHg MV V2 VTI: 37.2 cm MV dec slope: 260.8 cm/sec2 MV dec time: 0.35 sec Ao V2 max: 272.2 cm/sec Ao max P.0 mmHg Ao V2 mean: 254.9 cm/sec Ao mean P.9 mmHg Ao V2 VTI: 82.6 cm PA acc time: 0.11 sec E/E' av.3 Lateral E/e': 22.4 Medial E/e': 32.2  ______________________________________________________________________________ Report approved by: Amador Peña 2021 12:57 PM       US Renal Complete    Result Date: 2021  EXAM: US RENAL COMPLETE LOCATION: [facility] DATE/TIME: 2021 6:02 PM INDICATION: CKD. COMPARISON: 2008. TECHNIQUE: Routine Bilateral Renal and Bladder Ultrasound. FINDINGS: RIGHT KIDNEY: 9.0 x 3.5 x 4.8 cm. Echogenic cortex. Mild cortical thinning. No hydronephrosis. 1.1 x 1.0 cm indeterminate lesion lower pole. LEFT KIDNEY: 6.2 x 2.9 x 3.2 cm. Echogenic cortex. Mild cortical thinning. 3 indeterminate lesions, largest 1.5 x 1.2 x 1.4 cm at the upper pole. Nonobstructive stone lower pole. BLADDER: Unremarkable. Nonvisualization left ureteral jet.     IMPRESSION: 1.  Echogenic kidneys consistent with medical renal disease. Cortical atrophy left greater than right. 2.  Indeterminate bilateral renal masses. Dedicated enhanced renal CT would be needed to exclude neoplasm.    XR Chest Port 1 View    Result Date: 2021  EXAM: XR CHEST PORT 1 VIEW LOCATION: [facility] DATE/TIME: 2021 3:49 AM INDICATION: SOB. COMPARISON: 2021.     IMPRESSION: Normal heart size and pulmonary vascularity. No acute appearing  infiltrates or consolidation. Very large hiatal hernia. Aortic calcification. Degenerative changes both shoulders and multiple old left rib fractures. Remainder unremarkable with  no significant change from the prior study.    CT Head w/o Contrast    Result Date: 9/22/2021  EXAM: CT HEAD WITHOUT CONTRAST LOCATION: [facility] DATE/TIME: 09/22/2021, 10:54 AM INDICATION: Mental status change, confusion, unknown cause. COMPARISON: None. TECHNIQUE: Routine CT Head without IV contrast. Multiplanar reformats. Dose reduction techniques were used. FINDINGS: INTRACRANIAL CONTENTS: No intracranial hemorrhage, extra-axial collection, or mass effect.  No CT evidence of acute infarct. Mild to moderate presumed chronic small vessel ischemic changes. Mild to moderate generalized volume loss. No hydrocephalus. VISUALIZED ORBITS/SINUSES/MASTOIDS: No intraorbital abnormality. No paranasal sinus mucosal disease. No middle ear or mastoid effusion. BONES/SOFT TISSUES: No acute abnormality.     IMPRESSION: 1.  No acute intracranial process. 2.  Mild to moderate chronic small vessel ischemic disease and generalized brain parenchymal volume loss.       Latest radiology report personally reviewed.    Note created using dragon voice recognition software so sounds alike errors may have escaped editing.      09/25/2021   Behzad Desai MD  Hospitalist, [facility]  Pager: [phone]

## 2021-09-25 NOTE — PLAN OF CARE
Problem: Adult Inpatient Plan of Care  Goal: Plan of Care Review  Outcome: Improving     Problem: Adjustment to Illness (Heart Failure)  Goal: Optimal Coping  Outcome: Improving     Problem: Cardiac Output Decreased (Heart Failure)  Goal: Optimal Cardiac Output  Outcome: Improving     Problem: Fluid Imbalance (Heart Failure)  Goal: Fluid Balance  Outcome: Improving     Problem: Adult Inpatient Plan of Care  Goal: Absence of Hospital-Acquired Illness or Injury  Intervention: Identify and Manage Fall Risk  Recent Flowsheet Documentation  Taken 9/25/2021 1356 by Herlinda Butt RN  Safety Promotion/Fall Prevention:   activity supervised   assistive device/personal items within reach   safety round/check completed  Taken 9/25/2021 0858 by Herlinda Butt RN  Safety Promotion/Fall Prevention:   activity supervised   assistive device/personal items within reach   safety round/check completed     Problem: Functional Ability Impaired (Heart Failure)  Goal: Optimal Functional Ability  Intervention: Optimize Functional Ability  Recent Flowsheet Documentation  Taken 9/25/2021 1356 by Herlinda Butt RN  Activity Management:   activity adjusted per tolerance   up ad billy  Taken 9/25/2021 0858 by Herlinda Butt RN  Activity Management:   activity adjusted per tolerance   up ad billy     Problem: Respiratory Compromise (Heart Failure)  Goal: Effective Oxygenation and Ventilation  Intervention: Promote Airway Secretion Clearance  Recent Flowsheet Documentation  Taken 9/25/2021 1356 by Herlinda Butt RN  Cough And Deep Breathing: done independently per patient  Taken 9/25/2021 0858 by Herlinda Butt RN  Cough And Deep Breathing: done independently per patient   Patient is alert and able to let her needs be known. Denies any pain when asked. SR with first degree block on the monitor. Fluid restriction in place, tolerating well. Purwick in place and good output. Will continue to monitor.

## 2021-09-25 NOTE — PLAN OF CARE
Problem: Dysrhythmia (Heart Failure)  Goal: Stable Heart Rate and Rhythm  Outcome: Improving     Problem: Respiratory Compromise (Heart Failure)  Goal: Effective Oxygenation and Ventilation  Outcome: Improving     Pt's vital signs were stable. She is alert and oriented x4 but can be forgetful. She had no c/o pain overnight. Her PICC line flushed without difficulty. She denied any chest pain or shortness of breath. She was sinus slick with a 1st degree AV block on telemetry. She has a purewick in place that is draining appropriately. Call light is within reach.

## 2021-09-25 NOTE — PLAN OF CARE
Problem: Dysrhythmia (Heart Failure)  Goal: Stable Heart Rate and Rhythm  Outcome: Improving  NSR. Denies chest pain overnight.     Problem: Fluid Imbalance (Heart Failure)  Goal: Fluid Balance  Outcome: Improving  Fluid restriction maintained.     Pt nauseous with a small emesis at beginning of shift. PRN Zofran given x1 with relief.    A/O x4 but forgetful. Purewick in place with good urine output.     VSS. Will continue to monitor.

## 2021-09-25 NOTE — PROGRESS NOTES
Care Management Follow Up    Length of Stay (days): 3    Expected Discharge Date: 09/27/2021     Concerns to be Addressed: hyponatremia, TCU placement, insurance auth        Patient plan of care discussed at interdisciplinary rounds: Yes    Anticipated Discharge Disposition:  TCU     Anticipated Discharge Services:  TCU    Anticipated Discharge DME:  rafael    Patient/family educated on Medicare website which has current facility and service quality ratings:  Yes    Education Provided on the Discharge Plan:  Yes    Patient/Family in Agreement with the Plan:  Annia agrees to TCU    Referrals Placed by CM/SW: TCU           Additional Information:  Patient admitted for hyponatremia. PT recommending home; OT recommending TCU.    Assessment History:  Patient lives in an apartment alone. It is a [address] building, but she does not have services there. She is independent with ADLs at baseline. She does get some housekeeping help. She has been staying with daughter Annia over the last 1-2 weeks.     Spoke with Annia this morning. Discussed both recommendations. She would like to try for TCU as she feels more therapy would help patient get back to her baseline. She requests referrals to 1 [facility], 2 [facility], 3 [facility] and 4 [facility]. Referrals faxed. [facility] auth faxed yesterday. Transportation TBD.       Aria Osorio RN

## 2021-09-25 NOTE — PROGRESS NOTES
Nephrology    Stable kidney function. Now normal sodium  I will sign off now.  Please call with questions.     Darrian Beaver MD

## 2021-09-26 ENCOUNTER — APPOINTMENT (OUTPATIENT)
Dept: OCCUPATIONAL THERAPY | Facility: HOSPITAL | Age: 86
DRG: 644 | End: 2021-09-26
Payer: COMMERCIAL

## 2021-09-26 ENCOUNTER — APPOINTMENT (OUTPATIENT)
Dept: PHYSICAL THERAPY | Facility: HOSPITAL | Age: 86
DRG: 644 | End: 2021-09-26
Payer: COMMERCIAL

## 2021-09-26 LAB — SODIUM SERPL-SCNC: 138 MMOL/L (ref 136–145)

## 2021-09-26 PROCEDURE — 99232 SBSQ HOSP IP/OBS MODERATE 35: CPT | Performed by: INTERNAL MEDICINE

## 2021-09-26 PROCEDURE — 250N000013 HC RX MED GY IP 250 OP 250 PS 637: Performed by: INTERNAL MEDICINE

## 2021-09-26 PROCEDURE — 250N000013 HC RX MED GY IP 250 OP 250 PS 637: Performed by: STUDENT IN AN ORGANIZED HEALTH CARE EDUCATION/TRAINING PROGRAM

## 2021-09-26 PROCEDURE — 97110 THERAPEUTIC EXERCISES: CPT | Mod: GP

## 2021-09-26 PROCEDURE — 250N000011 HC RX IP 250 OP 636: Performed by: INTERNAL MEDICINE

## 2021-09-26 PROCEDURE — 97116 GAIT TRAINING THERAPY: CPT | Mod: GP

## 2021-09-26 PROCEDURE — 210N000001 HC R&B IMCU HEART CARE

## 2021-09-26 PROCEDURE — 97530 THERAPEUTIC ACTIVITIES: CPT | Mod: GP

## 2021-09-26 PROCEDURE — 84295 ASSAY OF SERUM SODIUM: CPT | Performed by: INTERNAL MEDICINE

## 2021-09-26 PROCEDURE — 97535 SELF CARE MNGMENT TRAINING: CPT | Mod: GO

## 2021-09-26 RX ADMIN — DORZOLAMIDE HYDROCHLORIDE AND TIMOLOL MALEATE 1 DROP: 20; 5 SOLUTION/ DROPS OPHTHALMIC at 20:45

## 2021-09-26 RX ADMIN — HYDRALAZINE HYDROCHLORIDE 10 MG: 20 INJECTION INTRAMUSCULAR; INTRAVENOUS at 09:55

## 2021-09-26 RX ADMIN — GEMFIBROZIL 600 MG: 600 TABLET ORAL at 06:44

## 2021-09-26 RX ADMIN — CITALOPRAM HYDROBROMIDE 10 MG: 10 TABLET ORAL at 09:36

## 2021-09-26 RX ADMIN — MELATONIN TAB 3 MG 3 MG: 3 TAB at 20:42

## 2021-09-26 RX ADMIN — HEPARIN SODIUM 5000 UNITS: 5000 INJECTION, SOLUTION INTRAVENOUS; SUBCUTANEOUS at 01:20

## 2021-09-26 RX ADMIN — DOCUSATE SODIUM 50 MG AND SENNOSIDES 8.6 MG 2 TABLET: 8.6; 5 TABLET, FILM COATED ORAL at 12:09

## 2021-09-26 RX ADMIN — DORZOLAMIDE HYDROCHLORIDE AND TIMOLOL MALEATE 1 DROP: 20; 5 SOLUTION/ DROPS OPHTHALMIC at 09:37

## 2021-09-26 RX ADMIN — LATANOPROST 1 DROP: 50 SOLUTION OPHTHALMIC at 22:34

## 2021-09-26 RX ADMIN — ACETAMINOPHEN 650 MG: 325 TABLET ORAL at 17:07

## 2021-09-26 RX ADMIN — LATANOPROST 1 DROP: 50 SOLUTION OPHTHALMIC at 20:45

## 2021-09-26 RX ADMIN — PANTOPRAZOLE SODIUM 40 MG: 20 TABLET, DELAYED RELEASE ORAL at 06:44

## 2021-09-26 RX ADMIN — GEMFIBROZIL 600 MG: 600 TABLET ORAL at 17:46

## 2021-09-26 RX ADMIN — SODIUM BICARBONATE 650 MG: 648 TABLET ORAL at 20:39

## 2021-09-26 RX ADMIN — SODIUM BICARBONATE 650 MG: 648 TABLET ORAL at 09:37

## 2021-09-26 RX ADMIN — HEPARIN SODIUM 5000 UNITS: 5000 INJECTION, SOLUTION INTRAVENOUS; SUBCUTANEOUS at 17:51

## 2021-09-26 RX ADMIN — Medication 1 PACKET: at 09:36

## 2021-09-26 RX ADMIN — Medication 1 PACKET: at 20:38

## 2021-09-26 RX ADMIN — DILTIAZEM HYDROCHLORIDE 240 MG: 120 CAPSULE, EXTENDED RELEASE ORAL at 09:36

## 2021-09-26 RX ADMIN — HEPARIN SODIUM 5000 UNITS: 5000 INJECTION, SOLUTION INTRAVENOUS; SUBCUTANEOUS at 09:37

## 2021-09-26 ASSESSMENT — MIFFLIN-ST. JEOR
SCORE: 933.07
SCORE: 933.07

## 2021-09-26 NOTE — PLAN OF CARE
Problem: Adult Inpatient Plan of Care  Goal: Plan of Care Review  Outcome: Improving     Problem: Adjustment to Illness (Heart Failure)  Goal: Optimal Coping  Outcome: Improving     Problem: Dysrhythmia (Heart Failure)  Goal: Stable Heart Rate and Rhythm  Outcome: Improving     Problem: Fluid Imbalance (Heart Failure)  Goal: Fluid Balance  Outcome: Improving     Problem: Respiratory Compromise (Heart Failure)  Goal: Effective Oxygenation and Ventilation  Outcome: Improving  Intervention: Promote Airway Secretion Clearance  Recent Flowsheet Documentation  Taken 9/26/2021 1400 by Herlinda Butt RN  Cough And Deep Breathing: done independently per patient  Taken 9/26/2021 0900 by Herlinda Butt RN  Cough And Deep Breathing: done independently per patient     Problem: Adult Inpatient Plan of Care  Goal: Absence of Hospital-Acquired Illness or Injury  Intervention: Identify and Manage Fall Risk  Recent Flowsheet Documentation  Taken 9/26/2021 1400 by Herlinda Butt RN  Safety Promotion/Fall Prevention:   activity supervised   chair alarm on  Taken 9/26/2021 0900 by Herlinda Butt RN  Safety Promotion/Fall Prevention:   activity supervised   chair alarm on     Problem: Functional Ability Impaired (Heart Failure)  Goal: Optimal Functional Ability  Intervention: Optimize Functional Ability  Recent Flowsheet Documentation  Taken 9/26/2021 1400 by Herlinda Butt RN  Activity Management:   activity adjusted per tolerance   up ad billy   up in chair  Taken 9/26/2021 0900 by Herlinda Butt RN  Activity Management:   activity adjusted per tolerance   up ad billy   up in chair   Patient is alert and able to let her needs be known. Intermittent confusion noted. PRN hydralazine was given  for elevated blood pressure, this was effective for her. T-pump ordered for complaints of back pain. VSS and NSR with first degree block on the monitor. Will continue to monitor.

## 2021-09-26 NOTE — PROGRESS NOTES
Care Management Follow Up    Length of Stay (days): 4    Expected Discharge Date: 09/27/2021  Pending secured TCU bed and [facility] auth     Concerns to be Addressed: hyponatremia, TCU placement, insurance auth        Patient plan of care discussed at interdisciplinary rounds: Yes     Anticipated Discharge Disposition:  TCU     Anticipated Discharge Services:  TCU     Anticipated Discharge DME:  rafael     Patient/family educated on Medicare website which has current facility and service quality ratings:  Yes     Education Provided on the Discharge Plan:  Yes     Patient/Family in Agreement with the Plan:  Annia agrees to TCU     Referrals Placed by CM/SW: TCU            Additional Information:  Patient admitted for hyponatremia. PT recommending home; OT recommending TCU.     Assessment History:  Patient lives in an apartment alone. It is a [address] building, but she does not have services there. She is independent with ADLs at baseline. She does get some housekeeping help. She has been staying with daughter Annia over the last 1-2 weeks.      Spoke with Annia 9/25. Discussed both recommendations. She would like to try for TCU as she feels more therapy would help patient get back to her baseline. She requests referrals to 1 [facility], 2 [facility], (no weekend admissions at ) 3 [facility] and 4 [facility] (refaxed entire referral). Follow up referrals calls made today 9/26.     [facility] auth faxed 9/24. Transportation TBD. PAS needed if TCU.       Aria Osorio RN

## 2021-09-26 NOTE — PLAN OF CARE
Problem: Fluid Imbalance (Heart Failure)  Goal: Fluid Balance  Outcome: Improving    Lungs diminished. Good oxygen saturation on room air. Sodium level 138 today. On 1200 fluid restriction. Awaiting TCU placement.

## 2021-09-26 NOTE — PROGRESS NOTES
Daily Progress Note        CODE STATUS:  Full Code    09/23/21  Assessment/Plan:     Shortness of breath/orthopnea:  -Although chest xray shows no congestion, BNP is elevated. I suspect this is due to some CHF. She also has a elvin large hiatal hernia which could be contributing to her symptoms  -Stopped IVF  -BNP of 478 noted. No prior labs for comparison.  Checked echo- normal EF, mild LVOT; and trops- negative     Hyponatremia:  -Appreciate nephrology consult  -Urine indices c/w SIADH. Decreased Celexa.  -Treated with hypertonic saline 9/22. Tolvaptan x 1 on 9/23 per nephrology  -Cont fluid restriction. Sodium has improved to 138 today. Nephrology has signed off.     Altered mental status:  -Patient was very lethargic when I saw her in ED. This is likely due to 2 doses of ativan she got in ED. Will avoid further ativan.   -CT head- negative.  -Improved     CKD-IV:  -Creatinine is at baseline  -Monitor. Avoid nephrotoxic meds     Hypertension:  -Cont home meds with hold parameters     Hyperlipidemia:  -Cont with home meds    Insomnia:  -Add melatonin    Disposition; TCU  Barrier to discharge; TCU availability     LOS: 1 day     Subjective:  Interval History: Patient seen and examined this morning. No new issues overnight. Awaiting insurance auth and TCU availability    Review of Systems:   As mentioned in subjective.    Patient Active Problem List   Diagnosis     Stage 4 chronic kidney disease (H)     Hyperlipidemia     Moderate major depression, single episode (H)     Rib fracture     Senile osteoporosis     Hyponatremia     KAM (acute kidney injury) (H)     Acute on chronic congestive heart failure, unspecified heart failure type (H)       Scheduled Meds:    citalopram  10 mg Oral Daily     diltiazem ER  240 mg Oral Daily     dorzolamide-timolol PF  1 drop Left Eye BID     gemfibrozil  600 mg Oral BID AC     heparin ANTICOAGULANT  5,000 Units Subcutaneous Q8H     latanoprost  1 drop Both Eyes At Bedtime      "melatonin  3 mg Oral At Bedtime     pantoprazole  40 mg Oral QAM AC     psyllium  1 packet Oral BID     sodium bicarbonate  650 mg Oral BID     sodium chloride (PF)  3 mL Intracatheter Q8H     Continuous Infusions:    [Held by provider] sodium chloride 3%       PRN Meds:.acetaminophen **OR** acetaminophen, bisacodyl, hydrALAZINE, lidocaine 4%, lidocaine (buffered or not buffered), ondansetron **OR** ondansetron, senna-docusate **OR** senna-docusate, sodium chloride (PF)    Objective:  Vital signs in last 24 hours:  Temp:  [97.7  F (36.5  C)-98.2  F (36.8  C)] 98.2  F (36.8  C)  Pulse:  [59-74] 74  Resp:  [18-20] 20  BP: (117-192)/(52-92) 147/67  SpO2:  [93 %-98 %] 98 %  Weight:   [unfilled]      Intake/Output Summary (Last 24 hours) at 9/23/2021 1623  Last data filed at 9/23/2021 1200  Gross per 24 hour   Intake 700 ml   Output 1225 ml   Net -525 ml       Physical Exam:  BP (!) 147/67 (BP Location: Right arm)   Pulse 74   Temp 98.2  F (36.8  C) (Oral)   Resp 20   Ht 1.6 m (5' 3\")   Wt 50.9 kg (112 lb 3.2 oz)   SpO2 98%   BMI 19.88 kg/m    HEENT: NC/AT                 Eyes-  Pupil round and reactive to light bilaterally                  Neck- supple, no JVP elevation,                  Sclera- anicteric                 Oropharynx- moist and pink  CHEST: Clear to auscultation bilateral anteriorly, no ronchi or wheezing  HEART: S1S2 normal, regular. Loud systolic and diastolic heart murmur noted  ABDMN: Soft. Non-tender, non-distended.  No guarding or rigidity. Bowel sounds- active  EXTRM: No pedal edema   INTGM: No skin rash, no cyanosis or clubbing  MUSCULOSKELETAL: no joint tenderness or swelling on upper and lower extremities  NEURO: Non-focal. Alert and oriented x 3    Lab Results:(I have personally reviewed the results)    Recent Results (from the past 24 hour(s))   Sodium    Collection Time: 09/26/21  4:22 AM   Result Value Ref Range    Sodium 138 136 - 145 mmol/L       All laboratory and imaging data in " the past 24 hours reviewed  Serum Glucose range:   Recent Labs   Lab 21  0612 21  0649 21  0624 21  0224   * 113 107 113     ABG:   Recent Labs   Lab 21  1047   PH 7.42   PCO2 34*   PO2 76   HCO3 22*     CBC:   Recent Labs   Lab 21  0624 21  0224   WBC 8.0 10.1   HGB 10.7* 12.2   HCT 30.9* 34.9*   MCV 88 88    372   NEUTROPHIL 64  --    LYMPH 20  --    MONOCYTE 12  --    EOSINOPHIL 2  --      Chemistry:   Recent Labs   Lab 21  0422 21  0612 21  0649 21  1103 21  0624 21  19421  1500    139 135*   < > 126*  126*   < > 124*   POTASSIUM  --  4.1 3.5  --  3.5  --   --    CHLORIDE  --  105 103  --  96*  --   --    CO2  --  27 24  --  23  --   --    BUN  --  32* 32*  --  30*  --   --    CR  --  1.65* 1.58*  --  1.48*  --   --    GFRESTIMATED  --  29* 30*  --  33*  --   --    GISELE  --  10.3 9.9  --  9.4  --   --    SANDRINE  --   --   --   --   --   --  22    < > = values in this interval not displayed.     Coags:  No results for input(s): INR, PROTIME, PTT in the last 168 hours.    Invalid input(s): APTT  Cardiac Markers:  Recent Labs   Lab 21  0022   TROPONINI 0.06          Echocardiogram Complete    Result Date: 2021  [id number]^PERRY^BROOK^[place] [address]  Name: MAXWELL TREJO MRN: [id number] : 1935 Study Date: 2021 11:45 AM Age: 85 yrs Gender: Female Patient Location: [facility] Reason For Study: Cardiomyopathy Ordering Physician: BROOK AMADOR Performed By: SAGAR  BSA: 1.5 m2 Height: 63 in Weight: 111 lb HR: 65 BP: 126/68 mmHg ______________________________________________________________________________ Procedure Technically difficult study. ______________________________________________________________________________ Interpretation Summary  There is moderate concentric left ventricular hypertrophy. The visual  ejection fraction is 60-65%. A mild left ventricular outflow tract (LVOT) obstruction is present. No regional wall motion abnormalities noted. Normal right ventricle size and systolic function. There is mild (1+) mitral regurgitation. There is mild trileaflet aortic sclerosis. ______________________________________________________________________________ Left Ventricle The left ventricle is normal in size. There is moderate concentric left ventricular hypertrophy. The visual ejection fraction is 60-65%. Diastolic Doppler findings (E/E' ratio and/or other parameters) suggest left ventricular filling pressures are increased. Grade I or early diastolic dysfunction. A mild left ventricular outflow tract (LVOT) obstruction is present. No regional wall motion abnormalities noted.  Right Ventricle Normal right ventricle size and systolic function.  Atria The left atrium is mildly dilated. Right atrial size is normal. Intact atrial septum.  Mitral Valve There is mild mitral annular calcification. The mitral valve leaflets appear thickened, but open well. There is mild (1+) mitral regurgitation.  Tricuspid Valve Tricuspid valve leaflets appear normal. There is no evidence of tricuspid stenosis or clinically significant tricuspid regurgitation.  Aortic Valve There is mild trileaflet aortic sclerosis. No aortic regurgitation is present.  Pulmonic Valve The pulmonic valve is normal in structure and function.  Vessels The aorta root is normal. Normal size ascending aorta.  Pericardium There is no pericardial effusion.  ______________________________________________________________________________ MMode/2D Measurements & Calculations RVDd: 3.1 cm IVSd: 1.6 cm LVIDd: 3.6 cm LVIDs: 2.5 cm LVPWd: 1.3 cm FS: 29.0 %  LV mass(C)d: 187.7 grams LV mass(C)dI: 124.6 grams/m2 Ao root diam: 2.8 cm LA dimension: 2.5 cm asc Aorta Diam: 3.1 cm LA/Ao: 0.86 LVOT diam: 2.0 cm LVOT area: 3.2 cm2 RWT: 0.74  Doppler Measurements & Calculations MV E  max robin: 87.7 cm/sec MV A max robin: 93.8 cm/sec MV E/A: 0.94 MV max P.5 mmHg MV mean P.3 mmHg MV V2 VTI: 37.2 cm MV dec slope: 260.8 cm/sec2 MV dec time: 0.35 sec Ao V2 max: 272.2 cm/sec Ao max P.0 mmHg Ao V2 mean: 254.9 cm/sec Ao mean P.9 mmHg Ao V2 VTI: 82.6 cm PA acc time: 0.11 sec E/E' av.3 Lateral E/e': 22.4 Medial E/e': 32.2  ______________________________________________________________________________ Report approved by: Amador Peña 2021 12:57 PM       US Renal Complete    Result Date: 2021  EXAM: US RENAL COMPLETE LOCATION: [facility] DATE/TIME: 2021 6:02 PM INDICATION: CKD. COMPARISON: 2008. TECHNIQUE: Routine Bilateral Renal and Bladder Ultrasound. FINDINGS: RIGHT KIDNEY: 9.0 x 3.5 x 4.8 cm. Echogenic cortex. Mild cortical thinning. No hydronephrosis. 1.1 x 1.0 cm indeterminate lesion lower pole. LEFT KIDNEY: 6.2 x 2.9 x 3.2 cm. Echogenic cortex. Mild cortical thinning. 3 indeterminate lesions, largest 1.5 x 1.2 x 1.4 cm at the upper pole. Nonobstructive stone lower pole. BLADDER: Unremarkable. Nonvisualization left ureteral jet.     IMPRESSION: 1.  Echogenic kidneys consistent with medical renal disease. Cortical atrophy left greater than right. 2.  Indeterminate bilateral renal masses. Dedicated enhanced renal CT would be needed to exclude neoplasm.    XR Chest Port 1 View    Result Date: 2021  EXAM: XR CHEST PORT 1 VIEW LOCATION: [facility] DATE/TIME: 2021 3:49 AM INDICATION: SOB. COMPARISON: 2021.     IMPRESSION: Normal heart size and pulmonary vascularity. No acute appearing infiltrates or consolidation. Very large hiatal hernia. Aortic calcification. Degenerative changes both shoulders and multiple old left rib fractures. Remainder unremarkable with  no significant change from the prior study.    CT Head w/o Contrast    Result Date: 2021  EXAM: CT HEAD WITHOUT CONTRAST LOCATION: [facility] DATE/TIME: 09/22/2021, 10:54 AM INDICATION: Mental status change, confusion, unknown cause. COMPARISON: None. TECHNIQUE: Routine CT Head without IV contrast. Multiplanar reformats. Dose reduction techniques were used. FINDINGS: INTRACRANIAL CONTENTS: No intracranial hemorrhage, extra-axial collection, or mass effect.  No CT evidence of acute infarct. Mild to moderate presumed chronic small vessel ischemic changes. Mild to moderate generalized volume loss. No hydrocephalus. VISUALIZED ORBITS/SINUSES/MASTOIDS: No intraorbital abnormality. No paranasal sinus mucosal disease. No middle ear or mastoid effusion. BONES/SOFT TISSUES: No acute abnormality.     IMPRESSION: 1.  No acute intracranial process. 2.  Mild to moderate chronic small vessel ischemic disease and generalized brain parenchymal volume loss.       Latest radiology report personally reviewed.    Note created using dragon voice recognition software so sounds alike errors may have escaped editing.      09/26/2021   Behzad Desai MD  Hospitalist, [facility]  Pager: [phone]

## 2021-09-26 NOTE — PLAN OF CARE
Problem: Functional Ability Impaired (Heart Failure)  Goal: Optimal Functional Ability  Outcome: Improving   Orientation fluctuates, more confused as the night goes on. Explained waiting for TCU bed availability. Daughter updated via telephone x1.   C/o back ache from bed. Heat pack and prn tylenol given with some improvement. Frequent repositioning in chair.

## 2021-09-27 ENCOUNTER — APPOINTMENT (OUTPATIENT)
Dept: PHYSICAL THERAPY | Facility: HOSPITAL | Age: 86
DRG: 644 | End: 2021-09-27
Payer: COMMERCIAL

## 2021-09-27 LAB
ALBUMIN PERCENT: 63.2 % (ref 51–67)
ALBUMIN SERPL ELPH-MCNC: 3.4 G/DL (ref 3.2–4.7)
ALPHA 1 PERCENT: 3.3 % (ref 2–4)
ALPHA 2 PERCENT: 11.6 % (ref 5–13)
ALPHA1 GLOB SERPL ELPH-MCNC: 0.2 G/DL (ref 0.1–0.3)
ALPHA2 GLOB SERPL ELPH-MCNC: 0.6 G/DL (ref 0.4–0.9)
ANION GAP SERPL CALCULATED.3IONS-SCNC: 7 MMOL/L (ref 5–18)
B-GLOBULIN SERPL ELPH-MCNC: 0.7 G/DL (ref 0.7–1.2)
BETA PERCENT: 12.1 % (ref 10–17)
BUN SERPL-MCNC: 36 MG/DL (ref 8–28)
CALCIUM SERPL-MCNC: 10.3 MG/DL (ref 8.5–10.5)
CHLORIDE BLD-SCNC: 106 MMOL/L (ref 98–107)
CO2 SERPL-SCNC: 23 MMOL/L (ref 22–31)
CREAT SERPL-MCNC: 1.67 MG/DL (ref 0.6–1.1)
ERYTHROCYTE [DISTWIDTH] IN BLOOD BY AUTOMATED COUNT: 13.7 % (ref 10–15)
GAMMA GLOB SERPL ELPH-MCNC: 0.5 G/DL (ref 0.6–1.4)
GAMMA GLOBULIN PERCENT: 9.8 % (ref 9–20)
GFR SERPL CREATININE-BSD FRML MDRD: 28 ML/MIN/1.73M2
GLUCOSE BLD-MCNC: 99 MG/DL (ref 70–125)
HCT VFR BLD AUTO: 32.6 % (ref 35–47)
HGB BLD-MCNC: 10.8 G/DL (ref 11.7–15.7)
MAYO MISC RESULT: NORMAL
MCH RBC QN AUTO: 30.4 PG (ref 26.5–33)
MCHC RBC AUTO-ENTMCNC: 33.1 G/DL (ref 31.5–36.5)
MCV RBC AUTO: 92 FL (ref 78–100)
PATH ICD:: ABNORMAL
PLATELET # BLD AUTO: 327 10E3/UL (ref 150–450)
POTASSIUM BLD-SCNC: 4.3 MMOL/L (ref 3.5–5)
PROT PATTERN SERPL ELPH-IMP: ABNORMAL
RBC # BLD AUTO: 3.55 10E6/UL (ref 3.8–5.2)
REVIEWING PATHOLOGIST: ABNORMAL
SODIUM SERPL-SCNC: 136 MMOL/L (ref 136–145)
SODIUM SERPL-SCNC: 136 MMOL/L (ref 136–145)
TOTAL PROTEIN SERUM FOR ELP (SYNCED VALUE): 5.4 G/DL
WBC # BLD AUTO: 6.9 10E3/UL (ref 4–11)

## 2021-09-27 PROCEDURE — 250N000013 HC RX MED GY IP 250 OP 250 PS 637: Performed by: INTERNAL MEDICINE

## 2021-09-27 PROCEDURE — 84295 ASSAY OF SERUM SODIUM: CPT | Performed by: INTERNAL MEDICINE

## 2021-09-27 PROCEDURE — 250N000013 HC RX MED GY IP 250 OP 250 PS 637

## 2021-09-27 PROCEDURE — 84165 PROTEIN E-PHORESIS SERUM: CPT | Mod: 26 | Performed by: PATHOLOGY

## 2021-09-27 PROCEDURE — 250N000013 HC RX MED GY IP 250 OP 250 PS 637: Performed by: STUDENT IN AN ORGANIZED HEALTH CARE EDUCATION/TRAINING PROGRAM

## 2021-09-27 PROCEDURE — 250N000011 HC RX IP 250 OP 636: Performed by: INTERNAL MEDICINE

## 2021-09-27 PROCEDURE — 99232 SBSQ HOSP IP/OBS MODERATE 35: CPT | Performed by: INTERNAL MEDICINE

## 2021-09-27 PROCEDURE — 85014 HEMATOCRIT: CPT | Performed by: INTERNAL MEDICINE

## 2021-09-27 PROCEDURE — 210N000001 HC R&B IMCU HEART CARE

## 2021-09-27 PROCEDURE — 97116 GAIT TRAINING THERAPY: CPT | Mod: GP

## 2021-09-27 PROCEDURE — 97530 THERAPEUTIC ACTIVITIES: CPT | Mod: GP

## 2021-09-27 PROCEDURE — 36592 COLLECT BLOOD FROM PICC: CPT | Performed by: INTERNAL MEDICINE

## 2021-09-27 RX ADMIN — ACETAMINOPHEN 650 MG: 325 TABLET ORAL at 17:53

## 2021-09-27 RX ADMIN — CYCLOBENZAPRINE 7.5 MG: 5 TABLET, FILM COATED ORAL at 21:37

## 2021-09-27 RX ADMIN — CITALOPRAM HYDROBROMIDE 10 MG: 10 TABLET ORAL at 08:08

## 2021-09-27 RX ADMIN — CYCLOBENZAPRINE 7.5 MG: 5 TABLET, FILM COATED ORAL at 00:16

## 2021-09-27 RX ADMIN — GEMFIBROZIL 600 MG: 600 TABLET ORAL at 16:05

## 2021-09-27 RX ADMIN — SODIUM BICARBONATE 650 MG: 648 TABLET ORAL at 21:36

## 2021-09-27 RX ADMIN — SODIUM BICARBONATE 650 MG: 648 TABLET ORAL at 08:08

## 2021-09-27 RX ADMIN — Medication 1 PACKET: at 08:08

## 2021-09-27 RX ADMIN — MELATONIN TAB 3 MG 3 MG: 3 TAB at 21:36

## 2021-09-27 RX ADMIN — HEPARIN SODIUM 5000 UNITS: 5000 INJECTION, SOLUTION INTRAVENOUS; SUBCUTANEOUS at 16:05

## 2021-09-27 RX ADMIN — LATANOPROST 1 DROP: 50 SOLUTION OPHTHALMIC at 21:40

## 2021-09-27 RX ADMIN — DORZOLAMIDE HYDROCHLORIDE AND TIMOLOL MALEATE 1 DROP: 20; 5 SOLUTION/ DROPS OPHTHALMIC at 08:07

## 2021-09-27 RX ADMIN — HEPARIN SODIUM 5000 UNITS: 5000 INJECTION, SOLUTION INTRAVENOUS; SUBCUTANEOUS at 00:16

## 2021-09-27 RX ADMIN — Medication 1 PACKET: at 21:35

## 2021-09-27 RX ADMIN — PANTOPRAZOLE SODIUM 40 MG: 20 TABLET, DELAYED RELEASE ORAL at 06:31

## 2021-09-27 RX ADMIN — HEPARIN SODIUM 5000 UNITS: 5000 INJECTION, SOLUTION INTRAVENOUS; SUBCUTANEOUS at 08:08

## 2021-09-27 RX ADMIN — DOCUSATE SODIUM 50 MG AND SENNOSIDES 8.6 MG 2 TABLET: 8.6; 5 TABLET, FILM COATED ORAL at 14:11

## 2021-09-27 RX ADMIN — DORZOLAMIDE HYDROCHLORIDE AND TIMOLOL MALEATE 1 DROP: 20; 5 SOLUTION/ DROPS OPHTHALMIC at 21:35

## 2021-09-27 RX ADMIN — GEMFIBROZIL 600 MG: 600 TABLET ORAL at 06:31

## 2021-09-27 RX ADMIN — DILTIAZEM HYDROCHLORIDE 240 MG: 120 CAPSULE, EXTENDED RELEASE ORAL at 08:08

## 2021-09-27 NOTE — PROGRESS NOTES
Daily Progress Note        CODE STATUS:  Full Code    09/23/21  Assessment/Plan:     Shortness of breath/orthopnea:  -Although chest xray shows no congestion, BNP is elevated. I suspect this is due to some CHF. She also has a elvin large hiatal hernia which could be contributing to her symptoms  -Stopped IVF  -BNP of 478 noted. No prior labs for comparison.  Checked echo- normal EF, mild LVOT; and trops- negative     Hyponatremia:  -Appreciate nephrology consult  -Urine indices c/w SIADH. Decreased Celexa.  -Treated with hypertonic saline 9/22. Tolvaptan x 1 on 9/23 per nephrology  -Cont fluid restriction. Sodium has improved. Nephrology has signed off.     Altered mental status:  -Patient was very lethargic when I saw her in ED. This is likely due to 2 doses of ativan she got in ED. Will avoid further ativan.   -CT head- negative.  -Improved. Patient appears to have some baseline dementia/cognitive impairment     CKD-IV:  -Creatinine is at baseline  -Monitor. Avoid nephrotoxic meds     Hypertension:  -Cont home meds with hold parameters     Hyperlipidemia:  -Cont with home meds    Insomnia:  -Add melatonin    Disposition; TCU  Barrier to discharge; TCU availability      Subjective:  Interval History: Patient seen and examined this morning. No acute issues reported overnight.     Review of Systems:   As mentioned in subjective.    Patient Active Problem List   Diagnosis     Stage 4 chronic kidney disease (H)     Hyperlipidemia     Moderate major depression, single episode (H)     Rib fracture     Senile osteoporosis     Hyponatremia     KAM (acute kidney injury) (H)     Acute on chronic congestive heart failure, unspecified heart failure type (H)       Scheduled Meds:    citalopram  10 mg Oral Daily     diltiazem ER  240 mg Oral Daily     dorzolamide-timolol PF  1 drop Left Eye BID     gemfibrozil  600 mg Oral BID AC     heparin ANTICOAGULANT  5,000 Units Subcutaneous Q8H     latanoprost  1 drop Both Eyes At Bedtime  "    melatonin  3 mg Oral At Bedtime     pantoprazole  40 mg Oral QAM AC     psyllium  1 packet Oral BID     sodium bicarbonate  650 mg Oral BID     sodium chloride (PF)  3 mL Intracatheter Q8H     Continuous Infusions:    [Held by provider] sodium chloride 3%       PRN Meds:.acetaminophen **OR** acetaminophen, bisacodyl, cyclobenzaprine, hydrALAZINE, lidocaine 4%, lidocaine (buffered or not buffered), ondansetron **OR** ondansetron, senna-docusate **OR** senna-docusate, sodium chloride (PF)    Objective:  Vital signs in last 24 hours:  Temp:  [97.4  F (36.3  C)-98.6  F (37  C)] 97.4  F (36.3  C)  Pulse:  [53-80] 68  Resp:  [16-20] 20  BP: (106-170)/(54-72) 106/54  SpO2:  [95 %-98 %] 97 %  Weight:   [unfilled]      Intake/Output Summary (Last 24 hours) at 9/23/2021 1623  Last data filed at 9/23/2021 1200  Gross per 24 hour   Intake 700 ml   Output 1225 ml   Net -525 ml       Physical Exam:  /54   Pulse 68   Temp 97.4  F (36.3  C)   Resp 20   Ht 1.6 m (5' 3\")   Wt 50.9 kg (112 lb 3.2 oz)   SpO2 97%   BMI 19.88 kg/m    HEENT: NC/AT                 Eyes-  Pupil round and reactive to light bilaterally                  Neck- supple, no JVP elevation,                  Sclera- anicteric                 Oropharynx- moist and pink  CHEST: Clear to auscultation bilateral anteriorly, no ronchi or wheezing  HEART: S1S2 normal, regular. Loud systolic and diastolic heart murmur noted  ABDMN: Soft. Non-tender, non-distended.  No guarding or rigidity. Bowel sounds- active  EXTRM: No pedal edema   INTGM: No skin rash, no cyanosis or clubbing  MUSCULOSKELETAL: no joint tenderness or swelling on upper and lower extremities  NEURO: Non-focal. Alert and oriented x 3    Lab Results:(I have personally reviewed the results)    Recent Results (from the past 24 hour(s))   Sodium    Collection Time: 09/27/21  6:22 AM   Result Value Ref Range    Sodium 136 136 - 145 mmol/L   Basic metabolic panel    Collection Time: 09/27/21  6:22 " AM   Result Value Ref Range    Sodium 136 136 - 145 mmol/L    Potassium 4.3 3.5 - 5.0 mmol/L    Chloride 106 98 - 107 mmol/L    Carbon Dioxide (CO2) 23 22 - 31 mmol/L    Anion Gap 7 5 - 18 mmol/L    Urea Nitrogen 36 (H) 8 - 28 mg/dL    Creatinine 1.67 (H) 0.60 - 1.10 mg/dL    Calcium 10.3 8.5 - 10.5 mg/dL    Glucose 99 70 - 125 mg/dL    GFR Estimate 28 (L) >60 mL/min/1.73m2   CBC with platelets    Collection Time: 09/27/21  8:15 AM   Result Value Ref Range    WBC Count 6.9 4.0 - 11.0 10e3/uL    RBC Count 3.55 (L) 3.80 - 5.20 10e6/uL    Hemoglobin 10.8 (L) 11.7 - 15.7 g/dL    Hematocrit 32.6 (L) 35.0 - 47.0 %    MCV 92 78 - 100 fL    MCH 30.4 26.5 - 33.0 pg    MCHC 33.1 31.5 - 36.5 g/dL    RDW 13.7 10.0 - 15.0 %    Platelet Count 327 150 - 450 10e3/uL       All laboratory and imaging data in the past 24 hours reviewed  Serum Glucose range:   Recent Labs   Lab 09/27/21 0622 09/25/21  0612 09/24/21  0649 09/23/21  0624   GLC 99 130* 113 107     ABG:   Recent Labs   Lab 09/22/21  1047   PH 7.42   PCO2 34*   PO2 76   HCO3 22*     CBC:   Recent Labs   Lab 09/27/21  0815 09/23/21  0624 09/22/21  0224   WBC 6.9 8.0 10.1   HGB 10.8* 10.7* 12.2   HCT 32.6* 30.9* 34.9*   MCV 92 88 88    327 372   NEUTROPHIL  --  64  --    LYMPH  --  20  --    MONOCYTE  --  12  --    EOSINOPHIL  --  2  --      Chemistry:   Recent Labs   Lab 09/27/21  0622 09/26/21  0422 09/25/21  0612 09/24/21  0649 09/24/21  0649 09/22/21  1946 09/22/21  1500     136 138 139   < > 135*   < > 124*   POTASSIUM 4.3  --  4.1  --  3.5   < >  --    CHLORIDE 106  --  105  --  103   < >  --    CO2 23  --  27  --  24   < >  --    BUN 36*  --  32*  --  32*   < >  --    CR 1.67*  --  1.65*  --  1.58*   < >  --    GFRESTIMATED 28*  --  29*  --  30*   < >  --    GISELE 10.3  --  10.3  --  9.9   < >  --    SANDRINE  --   --   --   --   --   --  22    < > = values in this interval not displayed.     Coags:  No results for input(s): INR, PROTIME, PTT in the last 168  hours.    Invalid input(s): APTT  Cardiac Markers:  Recent Labs   Lab 21  0022   TROPONINI 0.06          Echocardiogram Complete    Result Date: 2021  [id number]^PERRY^[place]OOK^[place] [address]  Name: MAXWELL TREJO MRN: [id number] : 1935 Study Date: 2021 11:45 AM Age: 85 yrs Gender: Female Patient Location: [facility] Reason For Study: Cardiomyopathy Ordering Physician: BROOK AMADOR Performed By: SAGAR  BSA: 1.5 m2 Height: 63 in Weight: 111 lb HR: 65 BP: 126/68 mmHg ______________________________________________________________________________ Procedure Technically difficult study. ______________________________________________________________________________ Interpretation Summary  There is moderate concentric left ventricular hypertrophy. The visual ejection fraction is 60-65%. A mild left ventricular outflow tract (LVOT) obstruction is present. No regional wall motion abnormalities noted. Normal right ventricle size and systolic function. There is mild (1+) mitral regurgitation. There is mild trileaflet aortic sclerosis. ______________________________________________________________________________ Left Ventricle The left ventricle is normal in size. There is moderate concentric left ventricular hypertrophy. The visual ejection fraction is 60-65%. Diastolic Doppler findings (E/E' ratio and/or other parameters) suggest left ventricular filling pressures are increased. Grade I or early diastolic dysfunction. A mild left ventricular outflow tract (LVOT) obstruction is present. No regional wall motion abnormalities noted.  Right Ventricle Normal right ventricle size and systolic function.  Atria The left atrium is mildly dilated. Right atrial size is normal. Intact atrial septum.  Mitral Valve There is mild mitral annular calcification. The mitral valve leaflets appear thickened, but open well. There is mild (1+) mitral  regurgitation.  Tricuspid Valve Tricuspid valve leaflets appear normal. There is no evidence of tricuspid stenosis or clinically significant tricuspid regurgitation.  Aortic Valve There is mild trileaflet aortic sclerosis. No aortic regurgitation is present.  Pulmonic Valve The pulmonic valve is normal in structure and function.  Vessels The aorta root is normal. Normal size ascending aorta.  Pericardium There is no pericardial effusion.  ______________________________________________________________________________ MMode/2D Measurements & Calculations RVDd: 3.1 cm IVSd: 1.6 cm LVIDd: 3.6 cm LVIDs: 2.5 cm LVPWd: 1.3 cm FS: 29.0 %  LV mass(C)d: 187.7 grams LV mass(C)dI: 124.6 grams/m2 Ao root diam: 2.8 cm LA dimension: 2.5 cm asc Aorta Diam: 3.1 cm LA/Ao: 0.86 LVOT diam: 2.0 cm LVOT area: 3.2 cm2 RWT: 0.74  Doppler Measurements & Calculations MV E max robin: 87.7 cm/sec MV A max robin: 93.8 cm/sec MV E/A: 0.94 MV max P.5 mmHg MV mean P.3 mmHg MV V2 VTI: 37.2 cm MV dec slope: 260.8 cm/sec2 MV dec time: 0.35 sec Ao V2 max: 272.2 cm/sec Ao max P.0 mmHg Ao V2 mean: 254.9 cm/sec Ao mean P.9 mmHg Ao V2 VTI: 82.6 cm PA acc time: 0.11 sec E/E' av.3 Lateral E/e': 22.4 Medial E/e': 32.2  ______________________________________________________________________________ Report approved by: Amador Peña 2021 12:57 PM       US Renal Complete    Result Date: 2021  EXAM: US RENAL COMPLETE LOCATION: [facility] DATE/TIME: 2021 6:02 PM INDICATION: CKD. COMPARISON: 2008. TECHNIQUE: Routine Bilateral Renal and Bladder Ultrasound. FINDINGS: RIGHT KIDNEY: 9.0 x 3.5 x 4.8 cm. Echogenic cortex. Mild cortical thinning. No hydronephrosis. 1.1 x 1.0 cm indeterminate lesion lower pole. LEFT KIDNEY: 6.2 x 2.9 x 3.2 cm. Echogenic cortex. Mild cortical thinning. 3 indeterminate lesions, largest 1.5 x 1.2 x 1.4 cm at the upper pole. Nonobstructive stone lower pole. BLADDER:  Unremarkable. Nonvisualization left ureteral jet.     IMPRESSION: 1.  Echogenic kidneys consistent with medical renal disease. Cortical atrophy left greater than right. 2.  Indeterminate bilateral renal masses. Dedicated enhanced renal CT would be needed to exclude neoplasm.    XR Chest Port 1 View    Result Date: 9/22/2021  EXAM: XR CHEST PORT 1 VIEW LOCATION: [facility] DATE/TIME: 9/22/2021 3:49 AM INDICATION: SOB. COMPARISON: 9/4/2021.     IMPRESSION: Normal heart size and pulmonary vascularity. No acute appearing infiltrates or consolidation. Very large hiatal hernia. Aortic calcification. Degenerative changes both shoulders and multiple old left rib fractures. Remainder unremarkable with  no significant change from the prior study.    CT Head w/o Contrast    Result Date: 9/22/2021  EXAM: CT HEAD WITHOUT CONTRAST LOCATION: [facility] DATE/TIME: 09/22/2021, 10:54 AM INDICATION: Mental status change, confusion, unknown cause. COMPARISON: None. TECHNIQUE: Routine CT Head without IV contrast. Multiplanar reformats. Dose reduction techniques were used. FINDINGS: INTRACRANIAL CONTENTS: No intracranial hemorrhage, extra-axial collection, or mass effect.  No CT evidence of acute infarct. Mild to moderate presumed chronic small vessel ischemic changes. Mild to moderate generalized volume loss. No hydrocephalus. VISUALIZED ORBITS/SINUSES/MASTOIDS: No intraorbital abnormality. No paranasal sinus mucosal disease. No middle ear or mastoid effusion. BONES/SOFT TISSUES: No acute abnormality.     IMPRESSION: 1.  No acute intracranial process. 2.  Mild to moderate chronic small vessel ischemic disease and generalized brain parenchymal volume loss.       Latest radiology report personally reviewed.    Note created using dragon voice recognition software so sounds alike errors may have escaped editing.      09/27/2021   Behzad Desai MD  Hospitalist, [facility]  Pager:  [phone]

## 2021-09-27 NOTE — PLAN OF CARE
Problem: Adult Inpatient Plan of Care  Goal: Optimal Comfort and Wellbeing  Outcome: Improving   Patient denied pain and SOB. Up in chair.

## 2021-09-27 NOTE — PLAN OF CARE
Problem: Dysrhythmia (Heart Failure)  Goal: Stable Heart Rate and Rhythm  Outcome: No Change     Problem: Risk for Delirium  Goal: Improved Attention and Thought Clarity  Outcome: No Change     She was alert and oriented to person and place but was confused and forgetful. Pt's vital signs were stable but her heart rate on telemetry occasionally dropped to 44 when she was asleep. She is asymptomatic and denied any chest pain or shortness of breath. HO was updated and said to just continue to monitor. She did c/o back pain at bedtime and was given her PRN flexeril which seemed to help. Her PICC line flushed without any difficulty. She has a purewick in place that is draining appropriately. She has a bed alarm and did set it off trying to go to the bathroom. She was reminded that she has a purewick and was repositioned. Call light is within reach.

## 2021-09-27 NOTE — PROGRESS NOTES
Care Management Follow Up    Length of Stay (days): 5    Expected Discharge Date: 09/28/2021     Concerns to be Addressed:     TCU placmeent  Patient plan of care discussed at interdisciplinary rounds: Yes    Anticipated Discharge Disposition:  Transitional Care     Anticipated Discharge Services:  Nursing, PT and OT  Anticipated Discharge DME:  Jaya    Patient/family educated on Medicare website which has current facility and service quality ratings:  yes  Education Provided on the Discharge Plan:  Yes  Patient/Family in Agreement with the Plan:  yes    Referrals Placed by CM/SW:  Transitional Care Units  Private pay costs discussed: Not applicable    Additional Information:  RANDY completed chart review, discussed updates with MD. Awaiting TCU placement.   VM left for admissions at [facility] and [facility].   Care management following for placement. Message left for Awilda Davison to check status of insurance authorization. [phone].  Received VM from fanta Guajardo received (# [id number])    3:11 PM addendum   Received phone call from Debra in admissins at [facility], bed available on 9/28/2021.   SADA spoke to patient's daughter Awilda Jaimes who is in agreement with this plan and would like to accept bed at [facility]. Informed Debra and daughter yohanjonomara auth received.  Debra stated and confirmed with facility, okay for 11am discharge from hospital. Pt's daughter reports family will transport.      Erma Matos LGSW

## 2021-09-27 NOTE — PLAN OF CARE
Problem: Dysrhythmia (Heart Failure)  Goal: Stable Heart Rate and Rhythm  Outcome: Improving   NSR.  Problem: Risk for Delirium  Goal: Optimal Coping  Outcome: Declining   Multiple c/o's back aching on evening shift 9-26.  Received Acetaminophen near early shift, also repositioned multiple times and given back rub with lotion twice.  These measures don't seem to help although at time patient appears to drift off to sleep briefly after backrub, etc. House Officer notified of this latter shift and order received for Flexeril prn. Night shift RN to give patient a dose.     Problem: Adult Inpatient Plan of Care  Goal: Absence of Hospital-Acquired Illness or Injury  Intervention: Identify and Manage Fall Risk - No fall on evening shift 9-26.  Recent Flowsheet Documentation  Taken 9/26/2021 2000 by Alexa Warner, RN  Safety Promotion/Fall Prevention:   activity supervised   bed alarm on   chair alarm on   lighting adjusted   mobility aid in reach   nonskid shoes/slippers when out of bed   patient and family education

## 2021-09-28 VITALS
HEIGHT: 63 IN | DIASTOLIC BLOOD PRESSURE: 71 MMHG | SYSTOLIC BLOOD PRESSURE: 158 MMHG | HEART RATE: 66 BPM | WEIGHT: 114.2 LBS | BODY MASS INDEX: 20.23 KG/M2 | RESPIRATION RATE: 16 BRPM | TEMPERATURE: 97.7 F | OXYGEN SATURATION: 97 %

## 2021-09-28 LAB — SODIUM SERPL-SCNC: 137 MMOL/L (ref 136–145)

## 2021-09-28 PROCEDURE — 99239 HOSP IP/OBS DSCHRG MGMT >30: CPT | Performed by: HOSPITALIST

## 2021-09-28 PROCEDURE — 250N000013 HC RX MED GY IP 250 OP 250 PS 637: Performed by: STUDENT IN AN ORGANIZED HEALTH CARE EDUCATION/TRAINING PROGRAM

## 2021-09-28 PROCEDURE — 84295 ASSAY OF SERUM SODIUM: CPT | Performed by: INTERNAL MEDICINE

## 2021-09-28 PROCEDURE — 250N000011 HC RX IP 250 OP 636: Performed by: INTERNAL MEDICINE

## 2021-09-28 PROCEDURE — 250N000013 HC RX MED GY IP 250 OP 250 PS 637: Performed by: INTERNAL MEDICINE

## 2021-09-28 RX ORDER — POLYETHYLENE GLYCOL 3350 17 G/17G
17 POWDER, FOR SOLUTION ORAL DAILY PRN
Qty: 510 G | Refills: 0 | Status: SHIPPED | OUTPATIENT
Start: 2021-09-28 | End: 2022-01-01

## 2021-09-28 RX ORDER — LANOLIN ALCOHOL/MO/W.PET/CERES
3 CREAM (GRAM) TOPICAL AT BEDTIME
Qty: 30 TABLET | Refills: 0 | Status: SHIPPED | OUTPATIENT
Start: 2021-09-28 | End: 2022-01-01

## 2021-09-28 RX ORDER — METHOCARBAMOL 500 MG/1
500 TABLET, FILM COATED ORAL 2 TIMES DAILY PRN
Qty: 20 TABLET | Refills: 0 | Status: SHIPPED | OUTPATIENT
Start: 2021-09-28 | End: 2022-01-01

## 2021-09-28 RX ORDER — BISACODYL 10 MG
10 SUPPOSITORY, RECTAL RECTAL DAILY PRN
Qty: 4 SUPPOSITORY | Refills: 0 | Status: SHIPPED | OUTPATIENT
Start: 2021-09-28 | End: 2022-01-01

## 2021-09-28 RX ORDER — AMOXICILLIN 250 MG
1 CAPSULE ORAL DAILY
Qty: 30 TABLET | Refills: 0 | Status: SHIPPED | OUTPATIENT
Start: 2021-09-28 | End: 2022-01-01

## 2021-09-28 RX ORDER — CITALOPRAM HYDROBROMIDE 10 MG/1
10 TABLET ORAL DAILY
Qty: 30 TABLET | Refills: 0 | Status: SHIPPED | OUTPATIENT
Start: 2021-09-28 | End: 2022-01-01

## 2021-09-28 RX ORDER — SODIUM BICARBONATE 650 MG/1
650 TABLET ORAL 2 TIMES DAILY
Qty: 60 TABLET | Refills: 0 | Status: SHIPPED | OUTPATIENT
Start: 2021-09-28

## 2021-09-28 RX ADMIN — CITALOPRAM HYDROBROMIDE 10 MG: 10 TABLET ORAL at 08:27

## 2021-09-28 RX ADMIN — DILTIAZEM HYDROCHLORIDE 240 MG: 120 CAPSULE, EXTENDED RELEASE ORAL at 08:25

## 2021-09-28 RX ADMIN — HYDRALAZINE HYDROCHLORIDE 10 MG: 20 INJECTION INTRAMUSCULAR; INTRAVENOUS at 04:37

## 2021-09-28 RX ADMIN — DORZOLAMIDE HYDROCHLORIDE AND TIMOLOL MALEATE 1 DROP: 20; 5 SOLUTION/ DROPS OPHTHALMIC at 08:29

## 2021-09-28 RX ADMIN — SODIUM BICARBONATE 650 MG: 648 TABLET ORAL at 08:27

## 2021-09-28 RX ADMIN — HEPARIN SODIUM 5000 UNITS: 5000 INJECTION, SOLUTION INTRAVENOUS; SUBCUTANEOUS at 00:28

## 2021-09-28 RX ADMIN — Medication 1 PACKET: at 08:25

## 2021-09-28 RX ADMIN — PANTOPRAZOLE SODIUM 40 MG: 20 TABLET, DELAYED RELEASE ORAL at 05:43

## 2021-09-28 RX ADMIN — HEPARIN SODIUM 5000 UNITS: 5000 INJECTION, SOLUTION INTRAVENOUS; SUBCUTANEOUS at 08:27

## 2021-09-28 RX ADMIN — GEMFIBROZIL 600 MG: 600 TABLET ORAL at 05:43

## 2021-09-28 RX ADMIN — DOCUSATE SODIUM 50 MG AND SENNOSIDES 8.6 MG 2 TABLET: 8.6; 5 TABLET, FILM COATED ORAL at 05:42

## 2021-09-28 RX ADMIN — BISACODYL SUPPOSITORY 10 MG: 10 SUPPOSITORY RECTAL at 08:27

## 2021-09-28 ASSESSMENT — MIFFLIN-ST. JEOR: SCORE: 942.14

## 2021-09-28 NOTE — PLAN OF CARE
Problem: Constipation  Goal: Effective Bowel Elimination  Outcome: No Change   Patient reports constipation. Concern about when she will go. Administered PRN senna and scheduled Psyllium. Ambulated with patient in the hallway. PRN Tylenol and flexeril administered for chronic lower back pain 4/10. Will continue to monitor.

## 2021-09-28 NOTE — PLAN OF CARE
Pt given PRN Hydralazine for a pressure of 169/82; One hour post dose, BP down to 147/67.     Pt complains of constipation. Active bowel sounds; Denies flatus. Per EPIC charting, last BM 9/24. Senna given this morning; Response pending.     AM Na+ improved at 137. Pt would like a less stringent fluid restriction. Feels it may be contributing to her constipation.

## 2021-09-28 NOTE — PLAN OF CARE
Physical Therapy Discharge Summary    Reason for therapy discharge:    Discharged to transitional care facility.    Progress towards therapy goal(s). See goals on Care Plan in [facility] electronic health record for goal details.  Goals partially met.  Barriers to achieving goals:   discharge from facility.    Therapy recommendation(s):    Continued therapy is recommended.  Rationale/Recommendations:  Continue PT at TCU as pt is progressing towards goals.

## 2021-09-28 NOTE — PROGRESS NOTES
Care Management Follow Up    Length of Stay (days): 6    Expected Discharge Date: 09/28/2021  Expected Time of Departure: 11am  Concerns to be Addressed: all concerns addressed in this encounter     Patient plan of care discussed at interdisciplinary rounds: Yes    Anticipated Discharge Disposition: Transitional Care, Skilled Nursing Facilty     Anticipated Discharge Services:  Nursing, PT and OT  Anticipated Discharge DME:  rafael    Patient/family educated on Medicare website which has current facility and service quality ratings: yes  Education Provided on the Discharge Plan:  yes  Patient/Family in Agreement with the Plan: yes, pt and daughter Majo in agreement    Referrals Placed by CM/SW:  yes  Private pay costs discussed: Not applicable    Additional Information:  Patient has been clinically accepted for admission to TCU at [facility] for today. Patient and daughter in agreement, daughter will transport pt.   PAS in chart.     Erma Matos, RAADSW

## 2021-09-28 NOTE — DISCHARGE SUMMARY
[facility] MEDICINE  DISCHARGE SUMMARY     Primary Care Physician: Everardo English  Admission Date: 9/22/2021   Discharge Provider: Beverly Skaggs Discharge Date: 9/28/2021   Diet:   Active Diet and Nourishment Order   Procedures     Fluid restriction 1200 ML FLUID     Combination Diet Regular Diet Adult     Advance Diet as Tolerated       Code Status: Full Code   Activity: DCACTIVITY: Activity as tolerated        Condition at Discharge: Good     REASON FOR PRESENTATION(See Admission Note for Details)   dyspnea    PRINCIPAL & ACTIVE DISCHARGE DIAGNOSES     Active Problems:    Hyponatremia    KAM (acute kidney injury) (H)    Acute on chronic congestive heart failure, unspecified heart failure type (H)      PENDING LABS     Unresulted Labs Ordered in the Past 30 Days of this Admission     Date and Time Order Name Status Description    9/23/2021  1:33 PM Protein electrophoresis random urine In process              PROCEDURES ( this hospitalization only)          RECOMMENDATIONS TO OUTPATIENT PROVIDER FOR F/U VISIT     Follow-up Appointments     Follow Up and recommended labs and tests      Follow up with intermediate physician.  The following labs/tests are   recommended: BMP 3 days.  Follow up with nephrology 2-4 months             DISPOSITION     Skilled Nursing Facility    SUMMARY OF HOSPITAL COURSE:      Holli Richardson is a 85 year old female with history of dementia, hypertension, hyperlipidemia, CKD 4 presented for evaluation of shortness of breath.  There was question of possible diastolic heart failure however shortness of breath resolved on its own without diuresis so this diagnosis is uncertain.  She does have a large hiatal hernia noted on imaging which could be contributing to symptoms, but again dyspnea has resolved spontaneously.  Patient was noted to have hyponatremia, with sodium as low as 120.  Seen by nephrology, urine studies consistent with SIADH and she  likely also does not take enough oral solute.  Her Celexa may be contributing and dose was reduced.  Patient was started on fluid restriction, sodium 137 today.  She is doing well and has no particular complaints except for constipation.  She was given a suppository and did have a bowel movement this morning.  Patient had been having some back pain and was started on a muscle relaxer as needed.  Her metabolic acidosis from her CKD 4 was monitored and her sodium bicarbonate dose was decreased.  Patient is discharging to transitional care.  She should have a basic metabolic profile checked in 3 days or so.  If her creatinine is worsening at all, I would consider relaxing her fluid restriction which is currently quite tight at 1.2 L/day.  Patient should follow-up with her nephrologist in 2 to 4 months and family indicated that she does have an appointment for this.  Patient was incidentally noted to have bilateral renal masses on ultrasound and dedicated renal CT was suggested if not already obtained and we will defer this to outpatient team to consider.  When I spoke with patient's daughter about discharge, daughter had some concerns about patient's osteoporosis medication and I encouraged her to discuss further with patient's outpatient team.    Discharge Medications with Med changes:     Current Discharge Medication List      START taking these medications    Details   bisacodyl (DULCOLAX) 10 MG suppository Place 1 suppository (10 mg) rectally daily as needed for constipation  Qty: 4 suppository, Refills: 0    Associated Diagnoses: Constipation, unspecified constipation type      melatonin 3 MG tablet Take 1 tablet (3 mg) by mouth At Bedtime  Qty: 30 tablet, Refills: 0    Associated Diagnoses: Moderate major depression, single episode (H)      methocarbamol (ROBAXIN) 500 MG tablet Take 1 tablet (500 mg) by mouth 2 times daily as needed for muscle spasms  Qty: 20 tablet, Refills: 0    Associated Diagnoses: Moderate  major depression, single episode (H)      polyethylene glycol (MIRALAX) 17 GM/Dose powder Take 17 g by mouth daily as needed for constipation  Qty: 510 g, Refills: 0    Associated Diagnoses: Constipation, unspecified constipation type      senna-docusate (SENOKOT-S/PERICOLACE) 8.6-50 MG tablet Take 1 tablet by mouth daily Can take up to 1 tab extra daily as needed for constipation  Qty: 30 tablet, Refills: 0    Associated Diagnoses: Constipation, unspecified constipation type         CONTINUE these medications which have CHANGED    Details   citalopram (CELEXA) 10 MG tablet Take 1 tablet (10 mg) by mouth daily  Qty: 30 tablet, Refills: 0    Associated Diagnoses: Moderate major depression, single episode (H)      sodium bicarbonate 650 MG tablet Take 1 tablet (650 mg) by mouth 2 times daily  Qty: 60 tablet, Refills: 0    Associated Diagnoses: Stage 4 chronic kidney disease (H)         CONTINUE these medications which have NOT CHANGED    Details   diltiazem ER (DILT-XR) 240 MG 24 hr ER beaded capsule Take 240 mg by mouth daily      dorzolamide-timolol PF (COSOPT) 22.3-6.8 MG/ML opthalmic solution Place 1 drop Into the left eye 2 times daily      gemfibroziL (LOPID) 600 MG tablet [GEMFIBROZIL (LOPID) 600 MG TABLET] Take 600 mg by mouth 2 (two) times a day before meals.      latanoprost (XALATAN) 0.005 % ophthalmic solution Place 1 drop into both eyes At Bedtime       omeprazole (PRILOSEC) 20 MG DR capsule Take 20 mg by mouth daily      psyllium (METAMUCIL/KONSYL) Packet Take 1 packet by mouth 2 times daily               Consults     NEPHROLOGY IP CONSULT  SOCIAL WORK IP CONSULT  VASCULAR ACCESS ADULT IP CONSULT  PHYSICAL THERAPY ADULT IP CONSULT  OCCUPATIONAL THERAPY ADULT IP CONSULT  PHYSICAL THERAPY ADULT IP CONSULT  OCCUPATIONAL THERAPY ADULT IP CONSULT    SIGNIFICANT IMAGING FINDINGS     Results for orders placed or performed during the hospital encounter of 09/22/21   XR Chest Port 1 View    Impression     IMPRESSION: Normal heart size and pulmonary vascularity. No acute appearing infiltrates or consolidation. Very large hiatal hernia. Aortic calcification. Degenerative changes both shoulders and multiple old left rib fractures. Remainder unremarkable with   no significant change from the prior study.   CT Head w/o Contrast    Impression    IMPRESSION:  1.  No acute intracranial process.  2.  Mild to moderate chronic small vessel ischemic disease and generalized brain parenchymal volume loss.     US Renal Complete    Impression    IMPRESSION:  1.  Echogenic kidneys consistent with medical renal disease. Cortical atrophy left greater than right.  2.  Indeterminate bilateral renal masses. Dedicated enhanced renal CT would be needed to exclude neoplasm.   Echocardiogram Complete   Result Value Ref Range    LVEF  60-65%        SIGNIFICANT LABORATORY FINDINGS     Most Recent 3 BMP's:Recent Labs   Lab Test 09/28/21  0445 09/27/21  0622 09/26/21  0422 09/25/21  0612 09/25/21  0612 09/24/21  0649 09/24/21  0649    136  136 138   < > 139   < > 135*   POTASSIUM  --  4.3  --   --  4.1  --  3.5   CHLORIDE  --  106  --   --  105  --  103   CO2  --  23  --   --  27  --  24   BUN  --  36*  --   --  32*  --  32*   CR  --  1.67*  --   --  1.65*  --  1.58*   ANIONGAP  --  7  --   --  7  --  8   GISELE  --  10.3  --   --  10.3  --  9.9   GLC  --  99  --   --  130*  --  113    < > = values in this interval not displayed.       Discharge Orders        General info for SNF    Length of Stay Estimate: Short Term Care: Estimated # of Days <30  Condition at Discharge: Improving  Level of care:skilled   Rehabilitation Potential: Good  Admission H&P remains valid and up-to-date: Yes  Recent Chemotherapy: N/A  Use Nursing Home Standing Orders: Yes     Mantoux instructions    Give two-step Mantoux (PPD) Per Facility Policy Yes     Follow Up and recommended labs and tests    Follow up with [facility] physician.  The following labs/tests are  recommended: BMP 3 days.  Follow up with nephrology 2-4 months     Activity - Up ad billy     Weight bearing status    WBAT     Reason for your hospital stay    Low sodium, weakness     Additional Discharge Instructions    If creatinine over 1.7 on next BMP and sodium remains normal, then would relax fluid restriction to 1.5L daily     Full Code     Physical Therapy Adult Consult    Evaluate and treat as clinically indicated.    Reason:  weakness     Occupational Therapy Adult Consult    Evaluate and treat as clinically indicated.    Reason:  weakness     Advance Diet as Tolerated    Follow this diet upon discharge: Orders Placed This Encounter      Fluid restriction 1200 ML FLUID      Combination Diet Regular Diet Adult         Examination   Physical Exam   Temp:  [97.4  F (36.3  C)-97.8  F (36.6  C)] 97.7  F (36.5  C)  Pulse:  [58-84] 66  Resp:  [16-20] 16  BP: (106-169)/(54-82) 158/71  SpO2:  [94 %-99 %] 97 %  Wt Readings from Last 1 Encounters:   09/28/21 51.8 kg (114 lb 3.2 oz)       General: in no apparent distress, non-toxic and alert female lying in hospital bed oriented x3  HEENT: Head normocephalic atraumatic, oral mucosa moist. Sclerae anicteric  CV: Regular rhythm, normal rate, no murmurs  Resp: No wheezes, no rales or rhonchi, no focal consolidations  GI: Belly soft, nondistended, nontender, bowel sounds present  Skin: No rashes or lesions  Extremities: No peripheral edema  Psych: Normal affect, mildly anxious mood  Neuro: CNII-XII grossly intact, moving all 4 extremities    Please see EMR for more detailed significant labs, imaging, consultant notes etc.    I, Beverly Skaggs MD, personally saw the patient today and spent greater than 30 minutes discharging this patient.    Beverly Skaggs MD  [employer]    CC:Everardo English

## 2021-09-28 NOTE — PLAN OF CARE
Occupational Therapy Discharge Summary    Reason for therapy discharge:    Discharged to transitional care facility.    Progress towards therapy goal(s). See goals on Care Plan in [facility] electronic health record for goal details.  Goals partially met.  Barriers to achieving goals:   discharge from facility.    Jess BABCOCK, OTR/L, CLT 9/28/2021 , 11:46 AM

## 2021-09-30 LAB
PATH ICD:: NORMAL
PROT PATTERN UR ELPH-IMP: NORMAL
REVIEWING PATHOLOGIST: NORMAL
TOTAL PROTEIN RANDOM URINE MG/DL: 18 MG/DL

## 2021-09-30 PROCEDURE — 84166 PROTEIN E-PHORESIS/URINE/CSF: CPT | Mod: 26 | Performed by: PATHOLOGY

## 2021-10-17 ENCOUNTER — HEALTH MAINTENANCE LETTER (OUTPATIENT)
Age: 86
End: 2021-10-17

## 2021-11-03 ENCOUNTER — LAB REQUISITION (OUTPATIENT)
Dept: LAB | Facility: CLINIC | Age: 86
End: 2021-11-03

## 2021-11-03 DIAGNOSIS — E78.49 OTHER HYPERLIPIDEMIA: ICD-10-CM

## 2021-11-03 DIAGNOSIS — E87.1 HYPO-OSMOLALITY AND HYPONATREMIA: ICD-10-CM

## 2021-11-03 LAB
ANION GAP SERPL CALCULATED.3IONS-SCNC: 13 MMOL/L (ref 5–18)
BUN SERPL-MCNC: 49 MG/DL (ref 8–28)
CALCIUM SERPL-MCNC: 10.2 MG/DL (ref 8.5–10.5)
CHLORIDE BLD-SCNC: 109 MMOL/L (ref 98–107)
CHOLEST SERPL-MCNC: 209 MG/DL
CO2 SERPL-SCNC: 17 MMOL/L (ref 22–31)
CREAT SERPL-MCNC: 2.21 MG/DL (ref 0.6–1.1)
GFR SERPL CREATININE-BSD FRML MDRD: 20 ML/MIN/1.73M2
GLUCOSE BLD-MCNC: 87 MG/DL (ref 70–125)
HDLC SERPL-MCNC: 86 MG/DL
LDLC SERPL CALC-MCNC: 108 MG/DL
POTASSIUM BLD-SCNC: 4.7 MMOL/L (ref 3.5–5)
SODIUM SERPL-SCNC: 139 MMOL/L (ref 136–145)
TRIGL SERPL-MCNC: 77 MG/DL

## 2021-11-03 PROCEDURE — 80061 LIPID PANEL: CPT | Performed by: PHYSICIAN ASSISTANT

## 2021-11-03 PROCEDURE — 80048 BASIC METABOLIC PNL TOTAL CA: CPT | Performed by: PHYSICIAN ASSISTANT

## 2021-11-08 ENCOUNTER — LAB REQUISITION (OUTPATIENT)
Dept: LAB | Facility: CLINIC | Age: 86
End: 2021-11-08

## 2021-11-08 DIAGNOSIS — E87.1 HYPO-OSMOLALITY AND HYPONATREMIA: ICD-10-CM

## 2021-11-08 LAB
ANION GAP SERPL CALCULATED.3IONS-SCNC: 11 MMOL/L (ref 5–18)
BUN SERPL-MCNC: 36 MG/DL (ref 8–28)
CALCIUM SERPL-MCNC: 9.9 MG/DL (ref 8.5–10.5)
CHLORIDE BLD-SCNC: 114 MMOL/L (ref 98–107)
CO2 SERPL-SCNC: 18 MMOL/L (ref 22–31)
CREAT SERPL-MCNC: 1.74 MG/DL (ref 0.6–1.1)
GFR SERPL CREATININE-BSD FRML MDRD: 27 ML/MIN/1.73M2
GLUCOSE BLD-MCNC: 150 MG/DL (ref 70–125)
POTASSIUM BLD-SCNC: 4.2 MMOL/L (ref 3.5–5)
SODIUM SERPL-SCNC: 143 MMOL/L (ref 136–145)

## 2021-11-08 PROCEDURE — 80048 BASIC METABOLIC PNL TOTAL CA: CPT | Performed by: PHYSICIAN ASSISTANT

## 2022-01-01 ENCOUNTER — LAB REQUISITION (OUTPATIENT)
Dept: LAB | Facility: CLINIC | Age: 87
End: 2022-01-01
Payer: COMMERCIAL

## 2022-01-01 ENCOUNTER — LAB REQUISITION (OUTPATIENT)
Dept: LAB | Facility: CLINIC | Age: 87
End: 2022-01-01

## 2022-01-01 ENCOUNTER — MEDICAL CORRESPONDENCE (OUTPATIENT)
Dept: HEALTH INFORMATION MANAGEMENT | Facility: CLINIC | Age: 87
End: 2022-01-01

## 2022-01-01 ENCOUNTER — APPOINTMENT (OUTPATIENT)
Dept: RADIOLOGY | Facility: HOSPITAL | Age: 87
DRG: 522 | End: 2022-01-01
Attending: STUDENT IN AN ORGANIZED HEALTH CARE EDUCATION/TRAINING PROGRAM
Payer: COMMERCIAL

## 2022-01-01 ENCOUNTER — APPOINTMENT (OUTPATIENT)
Dept: OCCUPATIONAL THERAPY | Facility: HOSPITAL | Age: 87
DRG: 522 | End: 2022-01-01
Payer: COMMERCIAL

## 2022-01-01 ENCOUNTER — APPOINTMENT (OUTPATIENT)
Dept: PHYSICAL THERAPY | Facility: HOSPITAL | Age: 87
End: 2022-01-01
Payer: COMMERCIAL

## 2022-01-01 ENCOUNTER — APPOINTMENT (OUTPATIENT)
Dept: OCCUPATIONAL THERAPY | Facility: HOSPITAL | Age: 87
DRG: 291 | End: 2022-01-01
Payer: COMMERCIAL

## 2022-01-01 ENCOUNTER — PATIENT OUTREACH (OUTPATIENT)
Dept: CARE COORDINATION | Facility: CLINIC | Age: 87
End: 2022-01-01

## 2022-01-01 ENCOUNTER — TRANSITIONAL CARE UNIT VISIT (OUTPATIENT)
Dept: GERIATRICS | Facility: CLINIC | Age: 87
End: 2022-01-01
Payer: COMMERCIAL

## 2022-01-01 ENCOUNTER — DOCUMENTATION ONLY (OUTPATIENT)
Dept: OTHER | Facility: CLINIC | Age: 87
End: 2022-01-01

## 2022-01-01 ENCOUNTER — APPOINTMENT (OUTPATIENT)
Dept: CT IMAGING | Facility: HOSPITAL | Age: 87
DRG: 522 | End: 2022-01-01
Attending: EMERGENCY MEDICINE
Payer: COMMERCIAL

## 2022-01-01 ENCOUNTER — ANESTHESIA EVENT (OUTPATIENT)
Dept: SURGERY | Facility: HOSPITAL | Age: 87
DRG: 522 | End: 2022-01-01
Payer: COMMERCIAL

## 2022-01-01 ENCOUNTER — APPOINTMENT (OUTPATIENT)
Dept: CARDIOLOGY | Facility: HOSPITAL | Age: 87
DRG: 291 | End: 2022-01-01
Attending: INTERNAL MEDICINE
Payer: COMMERCIAL

## 2022-01-01 ENCOUNTER — APPOINTMENT (OUTPATIENT)
Dept: RADIOLOGY | Facility: HOSPITAL | Age: 87
DRG: 522 | End: 2022-01-01
Attending: EMERGENCY MEDICINE
Payer: COMMERCIAL

## 2022-01-01 ENCOUNTER — HOSPITAL ENCOUNTER (EMERGENCY)
Facility: HOSPITAL | Age: 87
Discharge: HOME | End: 2022-10-25
Attending: EMERGENCY MEDICINE | Admitting: EMERGENCY MEDICINE
Payer: COMMERCIAL

## 2022-01-01 ENCOUNTER — HOSPITAL ENCOUNTER (INPATIENT)
Facility: HOSPITAL | Age: 87
LOS: 4 days | Discharge: NURSING, REHAB OR LONG-TERM CARE | DRG: 291 | End: 2022-10-24
Attending: EMERGENCY MEDICINE | Admitting: INTERNAL MEDICINE
Payer: COMMERCIAL

## 2022-01-01 ENCOUNTER — HEALTH MAINTENANCE LETTER (OUTPATIENT)
Age: 87
End: 2022-01-01

## 2022-01-01 ENCOUNTER — PREP FOR PROCEDURE (OUTPATIENT)
Dept: SURGERY | Facility: CLINIC | Age: 87
End: 2022-01-01

## 2022-01-01 ENCOUNTER — PATIENT OUTREACH (OUTPATIENT)
Dept: CARE COORDINATION | Facility: CLINIC | Age: 87
End: 2022-01-01
Payer: COMMERCIAL

## 2022-01-01 ENCOUNTER — ANESTHESIA (OUTPATIENT)
Dept: SURGERY | Facility: HOSPITAL | Age: 87
DRG: 522 | End: 2022-01-01
Payer: COMMERCIAL

## 2022-01-01 ENCOUNTER — APPOINTMENT (OUTPATIENT)
Dept: PHYSICAL THERAPY | Facility: HOSPITAL | Age: 87
DRG: 522 | End: 2022-01-01
Payer: COMMERCIAL

## 2022-01-01 ENCOUNTER — APPOINTMENT (OUTPATIENT)
Dept: OCCUPATIONAL THERAPY | Facility: HOSPITAL | Age: 87
DRG: 291 | End: 2022-01-01
Attending: INTERNAL MEDICINE
Payer: COMMERCIAL

## 2022-01-01 ENCOUNTER — APPOINTMENT (OUTPATIENT)
Dept: PHYSICAL THERAPY | Facility: HOSPITAL | Age: 87
DRG: 291 | End: 2022-01-01
Attending: INTERNAL MEDICINE
Payer: COMMERCIAL

## 2022-01-01 ENCOUNTER — HOSPITAL ENCOUNTER (OUTPATIENT)
Facility: HOSPITAL | Age: 87
Setting detail: OBSERVATION
Discharge: HOME | End: 2022-11-15
Attending: EMERGENCY MEDICINE | Admitting: EMERGENCY MEDICINE
Payer: COMMERCIAL

## 2022-01-01 ENCOUNTER — HOSPITAL ENCOUNTER (INPATIENT)
Facility: HOSPITAL | Age: 87
LOS: 5 days | Discharge: NURSING, REHAB OR LONG-TERM CARE | DRG: 522 | End: 2022-02-21
Attending: EMERGENCY MEDICINE | Admitting: INTERNAL MEDICINE
Payer: COMMERCIAL

## 2022-01-01 ENCOUNTER — TRANSFERRED RECORDS (OUTPATIENT)
Dept: HEALTH INFORMATION MANAGEMENT | Facility: CLINIC | Age: 87
End: 2022-01-01

## 2022-01-01 ENCOUNTER — APPOINTMENT (OUTPATIENT)
Dept: RADIOLOGY | Facility: HOSPITAL | Age: 87
End: 2022-01-01
Attending: EMERGENCY MEDICINE
Payer: COMMERCIAL

## 2022-01-01 ENCOUNTER — TELEPHONE (OUTPATIENT)
Dept: CARDIOLOGY | Facility: CLINIC | Age: 87
End: 2022-01-01

## 2022-01-01 ENCOUNTER — APPOINTMENT (OUTPATIENT)
Dept: OCCUPATIONAL THERAPY | Facility: HOSPITAL | Age: 87
End: 2022-01-01
Payer: COMMERCIAL

## 2022-01-01 ENCOUNTER — APPOINTMENT (OUTPATIENT)
Dept: RADIOLOGY | Facility: HOSPITAL | Age: 87
DRG: 291 | End: 2022-01-01
Attending: EMERGENCY MEDICINE
Payer: COMMERCIAL

## 2022-01-01 ENCOUNTER — APPOINTMENT (OUTPATIENT)
Dept: OCCUPATIONAL THERAPY | Facility: HOSPITAL | Age: 87
DRG: 522 | End: 2022-01-01
Attending: STUDENT IN AN ORGANIZED HEALTH CARE EDUCATION/TRAINING PROGRAM
Payer: COMMERCIAL

## 2022-01-01 ENCOUNTER — DISCHARGE SUMMARY NURSING HOME (OUTPATIENT)
Dept: GERIATRICS | Facility: CLINIC | Age: 87
End: 2022-01-01
Payer: COMMERCIAL

## 2022-01-01 VITALS
HEIGHT: 63 IN | HEART RATE: 72 BPM | TEMPERATURE: 96.5 F | OXYGEN SATURATION: 97 % | BODY MASS INDEX: 20.59 KG/M2 | WEIGHT: 116.2 LBS | DIASTOLIC BLOOD PRESSURE: 65 MMHG | RESPIRATION RATE: 18 BRPM | SYSTOLIC BLOOD PRESSURE: 149 MMHG

## 2022-01-01 VITALS
OXYGEN SATURATION: 96 % | DIASTOLIC BLOOD PRESSURE: 79 MMHG | SYSTOLIC BLOOD PRESSURE: 174 MMHG | RESPIRATION RATE: 18 BRPM | WEIGHT: 106.7 LBS | BODY MASS INDEX: 18.91 KG/M2 | HEIGHT: 63 IN | HEART RATE: 75 BPM | TEMPERATURE: 98 F

## 2022-01-01 VITALS
TEMPERATURE: 97.9 F | RESPIRATION RATE: 18 BRPM | DIASTOLIC BLOOD PRESSURE: 69 MMHG | SYSTOLIC BLOOD PRESSURE: 133 MMHG | BODY MASS INDEX: 19.32 KG/M2 | OXYGEN SATURATION: 99 % | HEART RATE: 73 BPM | HEIGHT: 62 IN | WEIGHT: 105 LBS

## 2022-01-01 VITALS
WEIGHT: 105 LBS | OXYGEN SATURATION: 95 % | RESPIRATION RATE: 18 BRPM | SYSTOLIC BLOOD PRESSURE: 165 MMHG | HEART RATE: 64 BPM | TEMPERATURE: 97.5 F | HEIGHT: 63 IN | BODY MASS INDEX: 18.61 KG/M2 | DIASTOLIC BLOOD PRESSURE: 71 MMHG

## 2022-01-01 VITALS
OXYGEN SATURATION: 99 % | HEIGHT: 63 IN | WEIGHT: 110 LBS | TEMPERATURE: 97 F | SYSTOLIC BLOOD PRESSURE: 154 MMHG | BODY MASS INDEX: 19.49 KG/M2 | HEART RATE: 79 BPM | DIASTOLIC BLOOD PRESSURE: 78 MMHG | RESPIRATION RATE: 15 BRPM

## 2022-01-01 VITALS
WEIGHT: 114.1 LBS | TEMPERATURE: 97.5 F | DIASTOLIC BLOOD PRESSURE: 75 MMHG | RESPIRATION RATE: 16 BRPM | BODY MASS INDEX: 20.21 KG/M2 | HEART RATE: 77 BPM | HEIGHT: 63 IN | SYSTOLIC BLOOD PRESSURE: 166 MMHG | OXYGEN SATURATION: 98 %

## 2022-01-01 VITALS
TEMPERATURE: 98.4 F | SYSTOLIC BLOOD PRESSURE: 140 MMHG | WEIGHT: 105 LBS | BODY MASS INDEX: 18.61 KG/M2 | OXYGEN SATURATION: 98 % | HEIGHT: 63 IN | DIASTOLIC BLOOD PRESSURE: 65 MMHG | HEART RATE: 66 BPM | RESPIRATION RATE: 28 BRPM

## 2022-01-01 VITALS
RESPIRATION RATE: 16 BRPM | WEIGHT: 116.2 LBS | DIASTOLIC BLOOD PRESSURE: 75 MMHG | TEMPERATURE: 96.8 F | SYSTOLIC BLOOD PRESSURE: 126 MMHG | HEART RATE: 69 BPM | OXYGEN SATURATION: 98 % | BODY MASS INDEX: 20.58 KG/M2

## 2022-01-01 VITALS
DIASTOLIC BLOOD PRESSURE: 74 MMHG | BODY MASS INDEX: 18.78 KG/M2 | TEMPERATURE: 97.3 F | OXYGEN SATURATION: 97 % | WEIGHT: 106 LBS | SYSTOLIC BLOOD PRESSURE: 137 MMHG | HEIGHT: 63 IN | HEART RATE: 76 BPM | RESPIRATION RATE: 16 BRPM

## 2022-01-01 VITALS
BODY MASS INDEX: 20.45 KG/M2 | OXYGEN SATURATION: 96 % | SYSTOLIC BLOOD PRESSURE: 154 MMHG | TEMPERATURE: 97.7 F | WEIGHT: 115.4 LBS | HEIGHT: 63 IN | HEART RATE: 76 BPM | RESPIRATION RATE: 16 BRPM | DIASTOLIC BLOOD PRESSURE: 76 MMHG

## 2022-01-01 DIAGNOSIS — D62 ANEMIA DUE TO BLOOD LOSS, ACUTE: ICD-10-CM

## 2022-01-01 DIAGNOSIS — N18.4 CHRONIC KIDNEY DISEASE, STAGE 4 (SEVERE) (H): ICD-10-CM

## 2022-01-01 DIAGNOSIS — R41.89 COGNITIVE IMPAIRMENT: ICD-10-CM

## 2022-01-01 DIAGNOSIS — Z87.81 STATUS POST OPEN REDUCTION AND INTERNAL FIXATION (ORIF) OF FRACTURE: ICD-10-CM

## 2022-01-01 DIAGNOSIS — N39.0 URINARY TRACT INFECTION, SITE NOT SPECIFIED: ICD-10-CM

## 2022-01-01 DIAGNOSIS — N18.4 CHRONIC KIDNEY DISEASE, STAGE IV (SEVERE) (H): ICD-10-CM

## 2022-01-01 DIAGNOSIS — M62.81 GENERALIZED MUSCLE WEAKNESS: ICD-10-CM

## 2022-01-01 DIAGNOSIS — M25.511 PAIN OF BOTH SHOULDER JOINTS: ICD-10-CM
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
## 2022-01-01 DIAGNOSIS — M25.512 PAIN OF BOTH SHOULDER JOINTS: ICD-10-CM

## 2022-01-01 DIAGNOSIS — I50.9 ACUTE DECOMPENSATED HEART FAILURE (H): Primary | ICD-10-CM

## 2022-01-01 DIAGNOSIS — S72.002A HIP FRACTURE, LEFT, CLOSED, INITIAL ENCOUNTER (H): ICD-10-CM

## 2022-01-01 DIAGNOSIS — E86.0 DEHYDRATION: ICD-10-CM

## 2022-01-01 DIAGNOSIS — R79.89 ELEVATED BRAIN NATRIURETIC PEPTIDE (BNP) LEVEL: ICD-10-CM

## 2022-01-01 DIAGNOSIS — Z86.39 HISTORY OF SIADH: ICD-10-CM

## 2022-01-01 DIAGNOSIS — E87.20 METABOLIC ACIDOSIS: ICD-10-CM

## 2022-01-01 DIAGNOSIS — S72.002A LEFT DISPLACED FEMORAL NECK FRACTURE (H): Primary | ICD-10-CM

## 2022-01-01 DIAGNOSIS — I13.0 HYPERTENSIVE HEART AND CHRONIC KIDNEY DISEASE WITH HEART FAILURE AND STAGE 1 THROUGH STAGE 4 CHRONIC KIDNEY DISEASE, OR UNSPECIFIED CHRONIC KIDNEY DISEASE (H): ICD-10-CM

## 2022-01-01 DIAGNOSIS — D64.9 ANEMIA, UNSPECIFIED: ICD-10-CM

## 2022-01-01 DIAGNOSIS — Z71.89 OTHER SPECIFIED COUNSELING: ICD-10-CM

## 2022-01-01 DIAGNOSIS — I50.9 ACUTE ON CHRONIC CONGESTIVE HEART FAILURE, UNSPECIFIED HEART FAILURE TYPE (H): ICD-10-CM

## 2022-01-01 DIAGNOSIS — I50.9 HEART FAILURE, UNSPECIFIED (H): ICD-10-CM

## 2022-01-01 DIAGNOSIS — Z98.890 STATUS POST OPEN REDUCTION AND INTERNAL FIXATION (ORIF) OF FRACTURE: ICD-10-CM

## 2022-01-01 DIAGNOSIS — F41.8 ANXIETY WITH DEPRESSION: ICD-10-CM

## 2022-01-01 DIAGNOSIS — M79.89 OTHER SPECIFIED SOFT TISSUE DISORDERS: ICD-10-CM

## 2022-01-01 DIAGNOSIS — F41.9 ANXIETY: Primary | ICD-10-CM

## 2022-01-01 DIAGNOSIS — H40.003 GLAUCOMA SUSPECT, BILATERAL: Primary | ICD-10-CM

## 2022-01-01 DIAGNOSIS — S72.002A CLOSED FRACTURE OF LEFT HIP, INITIAL ENCOUNTER (H): Primary | ICD-10-CM

## 2022-01-01 DIAGNOSIS — F41.1 GENERALIZED ANXIETY DISORDER: Primary | ICD-10-CM

## 2022-01-01 DIAGNOSIS — B37.0 THRUSH: ICD-10-CM

## 2022-01-01 DIAGNOSIS — R34 DECREASED URINATION: ICD-10-CM

## 2022-01-01 DIAGNOSIS — K59.00 CONSTIPATION, UNSPECIFIED CONSTIPATION TYPE: ICD-10-CM

## 2022-01-01 DIAGNOSIS — R06.02 SOB (SHORTNESS OF BREATH): ICD-10-CM

## 2022-01-01 DIAGNOSIS — D62 ACUTE POSTHEMORRHAGIC ANEMIA: ICD-10-CM

## 2022-01-01 LAB
ABO/RH(D): NORMAL
ALBUMIN MFR UR ELPH: 100.3 MG/DL
ALBUMIN SERPL BCG-MCNC: 3.1 G/DL (ref 3.5–5.2)
ALBUMIN SERPL BCG-MCNC: 4 G/DL (ref 3.5–5.2)
ALBUMIN SERPL BCG-MCNC: 4.1 G/DL (ref 3.5–5.2)
ALBUMIN SERPL BCG-MCNC: 4.2 G/DL (ref 3.5–5.2)
ALBUMIN SERPL-MCNC: 2.5 G/DL (ref 3.5–5)
ALBUMIN SERPL-MCNC: 2.7 G/DL (ref 3.5–5)
ALBUMIN SERPL-MCNC: 3.1 G/DL (ref 3.5–5)
ALBUMIN UR-MCNC: 20 MG/DL
ALBUMIN UR-MCNC: 20 MG/DL
ALBUMIN UR-MCNC: 30 MG/DL
ALP SERPL-CCNC: 104 U/L (ref 35–104)
ALP SERPL-CCNC: 108 U/L (ref 45–120)
ALP SERPL-CCNC: 118 U/L (ref 35–104)
ALP SERPL-CCNC: 124 U/L (ref 35–104)
ALP SERPL-CCNC: 145 U/L (ref 45–120)
ALP SERPL-CCNC: 98 U/L (ref 35–104)
ALT SERPL W P-5'-P-CCNC: 11 U/L (ref 10–35)
ALT SERPL W P-5'-P-CCNC: 12 U/L (ref 0–45)
ALT SERPL W P-5'-P-CCNC: 6 U/L (ref 10–35)
ALT SERPL W P-5'-P-CCNC: 7 U/L (ref 10–35)
ALT SERPL W P-5'-P-CCNC: 9 U/L (ref 10–35)
ALT SERPL W P-5'-P-CCNC: <9 U/L (ref 0–45)
ANION GAP SERPL CALCULATED.3IONS-SCNC: 10 MMOL/L (ref 5–18)
ANION GAP SERPL CALCULATED.3IONS-SCNC: 10 MMOL/L (ref 5–18)
ANION GAP SERPL CALCULATED.3IONS-SCNC: 11 MMOL/L (ref 5–18)
ANION GAP SERPL CALCULATED.3IONS-SCNC: 11 MMOL/L (ref 5–18)
ANION GAP SERPL CALCULATED.3IONS-SCNC: 11 MMOL/L (ref 7–15)
ANION GAP SERPL CALCULATED.3IONS-SCNC: 11 MMOL/L (ref 7–15)
ANION GAP SERPL CALCULATED.3IONS-SCNC: 12 MMOL/L (ref 5–18)
ANION GAP SERPL CALCULATED.3IONS-SCNC: 13 MMOL/L (ref 5–18)
ANION GAP SERPL CALCULATED.3IONS-SCNC: 13 MMOL/L (ref 5–18)
ANION GAP SERPL CALCULATED.3IONS-SCNC: 13 MMOL/L (ref 7–15)
ANION GAP SERPL CALCULATED.3IONS-SCNC: 15 MMOL/L (ref 7–15)
ANION GAP SERPL CALCULATED.3IONS-SCNC: 16 MMOL/L (ref 7–15)
ANION GAP SERPL CALCULATED.3IONS-SCNC: 7 MMOL/L (ref 5–18)
ANION GAP SERPL CALCULATED.3IONS-SCNC: 8 MMOL/L (ref 5–18)
ANION GAP SERPL CALCULATED.3IONS-SCNC: 9 MMOL/L (ref 5–18)
ANTIBODY SCREEN: NEGATIVE
APPEARANCE UR: CLEAR
AST SERPL W P-5'-P-CCNC: 12 U/L (ref 0–40)
AST SERPL W P-5'-P-CCNC: 13 U/L (ref 10–35)
AST SERPL W P-5'-P-CCNC: 15 U/L (ref 10–35)
AST SERPL W P-5'-P-CCNC: 18 U/L (ref 0–40)
AST SERPL W P-5'-P-CCNC: 23 U/L (ref 10–35)
AST SERPL W P-5'-P-CCNC: 8 U/L (ref 10–35)
ATRIAL RATE - MUSE: 75 BPM
ATRIAL RATE - MUSE: 82 BPM
BACTERIA #/AREA URNS HPF: ABNORMAL /HPF
BACTERIA #/AREA URNS HPF: ABNORMAL /HPF
BASOPHILS # BLD AUTO: 0 10E3/UL (ref 0–0.2)
BASOPHILS # BLD AUTO: 0.1 10E3/UL (ref 0–0.2)
BASOPHILS # BLD MANUAL: 0 10E3/UL (ref 0–0.2)
BASOPHILS NFR BLD AUTO: 1 %
BASOPHILS NFR BLD AUTO: 1 %
BASOPHILS NFR BLD MANUAL: 0 %
BILIRUB DIRECT SERPL-MCNC: <0.2 MG/DL (ref 0–0.3)
BILIRUB SERPL-MCNC: 0.2 MG/DL
BILIRUB SERPL-MCNC: 0.2 MG/DL
BILIRUB SERPL-MCNC: 0.2 MG/DL (ref 0–1)
BILIRUB SERPL-MCNC: 0.2 MG/DL (ref 0–1)
BILIRUB SERPL-MCNC: <0.2 MG/DL
BILIRUB SERPL-MCNC: <0.2 MG/DL
BILIRUB UR QL STRIP: NEGATIVE
BNP SERPL-MCNC: 947 PG/ML (ref 0–167)
BUN SERPL-MCNC: 23.1 MG/DL (ref 8–23)
BUN SERPL-MCNC: 23.3 MG/DL (ref 8–23)
BUN SERPL-MCNC: 27 MG/DL (ref 8–28)
BUN SERPL-MCNC: 28.4 MG/DL (ref 8–23)
BUN SERPL-MCNC: 30 MG/DL (ref 8–28)
BUN SERPL-MCNC: 31 MG/DL (ref 8–28)
BUN SERPL-MCNC: 31 MG/DL (ref 8–28)
BUN SERPL-MCNC: 32 MG/DL (ref 8–28)
BUN SERPL-MCNC: 33 MG/DL (ref 8–28)
BUN SERPL-MCNC: 34.5 MG/DL (ref 8–23)
BUN SERPL-MCNC: 37 MG/DL (ref 8–28)
BUN SERPL-MCNC: 39 MG/DL (ref 8–28)
BUN SERPL-MCNC: 48.2 MG/DL (ref 8–23)
BUN SERPL-MCNC: 56.9 MG/DL (ref 8–23)
BUN SERPL-MCNC: 63.9 MG/DL (ref 8–23)
BUN SERPL-MCNC: 66.4 MG/DL (ref 8–23)
BUN SERPL-MCNC: 74.5 MG/DL (ref 8–23)
BURR CELLS BLD QL SMEAR: ABNORMAL
CALCIUM SERPL-MCNC: 10 MG/DL (ref 8.8–10.2)
CALCIUM SERPL-MCNC: 10.1 MG/DL (ref 8.5–10.5)
CALCIUM SERPL-MCNC: 10.1 MG/DL (ref 8.8–10.2)
CALCIUM SERPL-MCNC: 10.1 MG/DL (ref 8.8–10.2)
CALCIUM SERPL-MCNC: 10.3 MG/DL (ref 8.5–10.5)
CALCIUM SERPL-MCNC: 10.3 MG/DL (ref 8.5–10.5)
CALCIUM SERPL-MCNC: 10.7 MG/DL (ref 8.5–10.5)
CALCIUM SERPL-MCNC: 8.5 MG/DL (ref 8.5–10.5)
CALCIUM SERPL-MCNC: 8.5 MG/DL (ref 8.5–10.5)
CALCIUM SERPL-MCNC: 8.8 MG/DL (ref 8.5–10.5)
CALCIUM SERPL-MCNC: 9 MG/DL (ref 8.5–10.5)
CALCIUM SERPL-MCNC: 9.1 MG/DL (ref 8.8–10.2)
CALCIUM SERPL-MCNC: 9.3 MG/DL (ref 8.5–10.5)
CALCIUM SERPL-MCNC: 9.3 MG/DL (ref 8.8–10.2)
CALCIUM SERPL-MCNC: 9.4 MG/DL (ref 8.5–10.5)
CALCIUM SERPL-MCNC: 9.5 MG/DL (ref 8.8–10.2)
CALCIUM SERPL-MCNC: 9.8 MG/DL (ref 8.8–10.2)
CHLORIDE BLD-SCNC: 103 MMOL/L (ref 98–107)
CHLORIDE BLD-SCNC: 108 MMOL/L (ref 98–107)
CHLORIDE BLD-SCNC: 110 MMOL/L (ref 98–107)
CHLORIDE BLD-SCNC: 110 MMOL/L (ref 98–107)
CHLORIDE BLD-SCNC: 111 MMOL/L (ref 98–107)
CHLORIDE BLD-SCNC: 112 MMOL/L (ref 98–107)
CHLORIDE BLD-SCNC: 112 MMOL/L (ref 98–107)
CHLORIDE BLD-SCNC: 116 MMOL/L (ref 98–107)
CHLORIDE SERPL-SCNC: 101 MMOL/L (ref 98–107)
CHLORIDE SERPL-SCNC: 102 MMOL/L (ref 98–107)
CHLORIDE SERPL-SCNC: 103 MMOL/L (ref 98–107)
CHLORIDE SERPL-SCNC: 103 MMOL/L (ref 98–107)
CHLORIDE SERPL-SCNC: 105 MMOL/L (ref 98–107)
CHLORIDE SERPL-SCNC: 109 MMOL/L (ref 98–107)
CHLORIDE SERPL-SCNC: 112 MMOL/L (ref 98–107)
CLUE CELLS: ABNORMAL
CO2 SERPL-SCNC: 12 MMOL/L (ref 22–31)
CO2 SERPL-SCNC: 16 MMOL/L (ref 22–31)
CO2 SERPL-SCNC: 17 MMOL/L (ref 22–31)
CO2 SERPL-SCNC: 17 MMOL/L (ref 22–31)
CO2 SERPL-SCNC: 18 MMOL/L (ref 22–31)
CO2 SERPL-SCNC: 18 MMOL/L (ref 22–31)
CO2 SERPL-SCNC: 19 MMOL/L (ref 22–31)
CO2 SERPL-SCNC: 19 MMOL/L (ref 22–31)
CO2 SERPL-SCNC: 20 MMOL/L (ref 22–31)
CO2 SERPL-SCNC: 20 MMOL/L (ref 22–31)
COLOR UR AUTO: ABNORMAL
COLOR UR AUTO: COLORLESS
COLOR UR AUTO: COLORLESS
CREAT SERPL-MCNC: 1.64 MG/DL (ref 0.6–1.1)
CREAT SERPL-MCNC: 1.68 MG/DL (ref 0.6–1.1)
CREAT SERPL-MCNC: 1.72 MG/DL (ref 0.6–1.1)
CREAT SERPL-MCNC: 1.73 MG/DL (ref 0.6–1.1)
CREAT SERPL-MCNC: 1.74 MG/DL (ref 0.6–1.1)
CREAT SERPL-MCNC: 1.78 MG/DL (ref 0.6–1.1)
CREAT SERPL-MCNC: 1.8 MG/DL (ref 0.6–1.1)
CREAT SERPL-MCNC: 1.83 MG/DL (ref 0.51–0.95)
CREAT SERPL-MCNC: 1.83 MG/DL (ref 0.6–1.1)
CREAT SERPL-MCNC: 1.87 MG/DL (ref 0.51–0.95)
CREAT SERPL-MCNC: 1.91 MG/DL (ref 0.6–1.1)
CREAT SERPL-MCNC: 1.96 MG/DL (ref 0.6–1.1)
CREAT SERPL-MCNC: 2.09 MG/DL (ref 0.51–0.95)
CREAT SERPL-MCNC: 2.11 MG/DL (ref 0.51–0.95)
CREAT SERPL-MCNC: 2.15 MG/DL (ref 0.51–0.95)
CREAT SERPL-MCNC: 2.3 MG/DL (ref 0.6–1.1)
CREAT SERPL-MCNC: 2.38 MG/DL (ref 0.51–0.95)
CREAT SERPL-MCNC: 2.45 MG/DL (ref 0.51–0.95)
CREAT SERPL-MCNC: 2.6 MG/DL (ref 0.51–0.95)
CREAT SERPL-MCNC: 2.62 MG/DL (ref 0.51–0.95)
CREAT UR-MCNC: 57 MG/DL
CREAT UR-MCNC: 57 MG/DL
D DIMER PPP FEU-MCNC: 0.7 UG/ML FEU (ref 0–0.5)
DEPRECATED HCO3 PLAS-SCNC: 17 MMOL/L (ref 22–29)
DEPRECATED HCO3 PLAS-SCNC: 18 MMOL/L (ref 22–29)
DEPRECATED HCO3 PLAS-SCNC: 18 MMOL/L (ref 22–29)
DEPRECATED HCO3 PLAS-SCNC: 19 MMOL/L (ref 22–29)
DEPRECATED HCO3 PLAS-SCNC: 20 MMOL/L (ref 22–29)
DEPRECATED S PYO AG THROAT QL EIA: NEGATIVE
DIASTOLIC BLOOD PRESSURE - MUSE: 94 MMHG
DIASTOLIC BLOOD PRESSURE - MUSE: NORMAL MMHG
ELLIPTOCYTES BLD QL SMEAR: SLIGHT
EOSINOPHIL # BLD AUTO: 0.2 10E3/UL (ref 0–0.7)
EOSINOPHIL # BLD AUTO: 0.4 10E3/UL (ref 0–0.7)
EOSINOPHIL # BLD MANUAL: 0 10E3/UL (ref 0–0.7)
EOSINOPHIL NFR BLD AUTO: 4 %
EOSINOPHIL NFR BLD AUTO: 6 %
EOSINOPHIL NFR BLD MANUAL: 0 %
ERYTHROCYTE [DISTWIDTH] IN BLOOD BY AUTOMATED COUNT: 13.6 % (ref 10–15)
ERYTHROCYTE [DISTWIDTH] IN BLOOD BY AUTOMATED COUNT: 14.2 % (ref 10–15)
ERYTHROCYTE [DISTWIDTH] IN BLOOD BY AUTOMATED COUNT: 14.6 % (ref 10–15)
ERYTHROCYTE [DISTWIDTH] IN BLOOD BY AUTOMATED COUNT: 14.8 % (ref 10–15)
ERYTHROCYTE [DISTWIDTH] IN BLOOD BY AUTOMATED COUNT: 14.8 % (ref 10–15)
ERYTHROCYTE [DISTWIDTH] IN BLOOD BY AUTOMATED COUNT: 14.9 % (ref 10–15)
ERYTHROCYTE [DISTWIDTH] IN BLOOD BY AUTOMATED COUNT: 15 % (ref 10–15)
ERYTHROCYTE [DISTWIDTH] IN BLOOD BY AUTOMATED COUNT: 15.1 % (ref 10–15)
ERYTHROCYTE [DISTWIDTH] IN BLOOD BY AUTOMATED COUNT: 15.2 % (ref 10–15)
ERYTHROCYTE [DISTWIDTH] IN BLOOD BY AUTOMATED COUNT: 15.2 % (ref 10–15)
ERYTHROCYTE [DISTWIDTH] IN BLOOD BY AUTOMATED COUNT: 15.5 % (ref 10–15)
ERYTHROCYTE [DISTWIDTH] IN BLOOD BY AUTOMATED COUNT: 15.7 % (ref 10–15)
ERYTHROCYTE [DISTWIDTH] IN BLOOD BY AUTOMATED COUNT: 17.4 % (ref 10–15)
ERYTHROCYTE [DISTWIDTH] IN BLOOD BY AUTOMATED COUNT: 18.8 % (ref 10–15)
FLUAV RNA SPEC QL NAA+PROBE: NEGATIVE
FLUAV RNA SPEC QL NAA+PROBE: NEGATIVE
FLUBV RNA RESP QL NAA+PROBE: NEGATIVE
FLUBV RNA RESP QL NAA+PROBE: NEGATIVE
GFR SERPL CREATININE-BSD FRML MDRD: 17 ML/MIN/1.73M2
GFR SERPL CREATININE-BSD FRML MDRD: 18 ML/MIN/1.73M2
GFR SERPL CREATININE-BSD FRML MDRD: 19 ML/MIN/1.73M2
GFR SERPL CREATININE-BSD FRML MDRD: 20 ML/MIN/1.73M2
GFR SERPL CREATININE-BSD FRML MDRD: 20 ML/MIN/1.73M2
GFR SERPL CREATININE-BSD FRML MDRD: 22 ML/MIN/1.73M2
GFR SERPL CREATININE-BSD FRML MDRD: 23 ML/MIN/1.73M2
GFR SERPL CREATININE-BSD FRML MDRD: 23 ML/MIN/1.73M2
GFR SERPL CREATININE-BSD FRML MDRD: 25 ML/MIN/1.73M2
GFR SERPL CREATININE-BSD FRML MDRD: 25 ML/MIN/1.73M2
GFR SERPL CREATININE-BSD FRML MDRD: 26 ML/MIN/1.73M2
GFR SERPL CREATININE-BSD FRML MDRD: 27 ML/MIN/1.73M2
GFR SERPL CREATININE-BSD FRML MDRD: 28 ML/MIN/1.73M2
GFR SERPL CREATININE-BSD FRML MDRD: 28 ML/MIN/1.73M2
GFR SERPL CREATININE-BSD FRML MDRD: 29 ML/MIN/1.73M2
GFR SERPL CREATININE-BSD FRML MDRD: 29 ML/MIN/1.73M2
GFR SERPL CREATININE-BSD FRML MDRD: 30 ML/MIN/1.73M2
GFR SERPL CREATININE-BSD FRML MDRD: 31 ML/MIN/1.73M2
GLUCOSE BLD-MCNC: 105 MG/DL (ref 70–125)
GLUCOSE BLD-MCNC: 106 MG/DL (ref 70–125)
GLUCOSE BLD-MCNC: 108 MG/DL (ref 70–125)
GLUCOSE BLD-MCNC: 109 MG/DL (ref 70–125)
GLUCOSE BLD-MCNC: 151 MG/DL (ref 70–125)
GLUCOSE BLD-MCNC: 57 MG/DL (ref 70–125)
GLUCOSE BLD-MCNC: 89 MG/DL (ref 70–125)
GLUCOSE BLD-MCNC: 91 MG/DL (ref 70–125)
GLUCOSE BLD-MCNC: 93 MG/DL (ref 70–125)
GLUCOSE BLD-MCNC: 97 MG/DL (ref 70–125)
GLUCOSE BLDC GLUCOMTR-MCNC: 112 MG/DL (ref 70–99)
GLUCOSE BLDC GLUCOMTR-MCNC: 137 MG/DL (ref 70–99)
GLUCOSE BLDC GLUCOMTR-MCNC: 148 MG/DL (ref 70–99)
GLUCOSE BLDC GLUCOMTR-MCNC: 99 MG/DL (ref 70–99)
GLUCOSE SERPL-MCNC: 101 MG/DL (ref 70–99)
GLUCOSE SERPL-MCNC: 101 MG/DL (ref 70–99)
GLUCOSE SERPL-MCNC: 104 MG/DL (ref 70–99)
GLUCOSE SERPL-MCNC: 107 MG/DL (ref 70–99)
GLUCOSE SERPL-MCNC: 108 MG/DL (ref 70–99)
GLUCOSE SERPL-MCNC: 86 MG/DL (ref 70–99)
GLUCOSE SERPL-MCNC: 93 MG/DL (ref 70–99)
GLUCOSE SERPL-MCNC: 98 MG/DL (ref 70–99)
GLUCOSE SERPL-MCNC: 99 MG/DL (ref 70–99)
GLUCOSE UR STRIP-MCNC: NEGATIVE MG/DL
GROUP A STREP BY PCR: NOT DETECTED
HCT VFR BLD AUTO: 28.1 % (ref 35–47)
HCT VFR BLD AUTO: 32.8 % (ref 35–47)
HCT VFR BLD AUTO: 33.4 % (ref 35–47)
HCT VFR BLD AUTO: 34.5 % (ref 35–47)
HCT VFR BLD AUTO: 34.8 % (ref 35–47)
HCT VFR BLD AUTO: 34.9 % (ref 35–47)
HCT VFR BLD AUTO: 36.6 % (ref 35–47)
HCT VFR BLD AUTO: 36.6 % (ref 35–47)
HCT VFR BLD AUTO: 36.7 % (ref 35–47)
HCT VFR BLD AUTO: 36.7 % (ref 35–47)
HCT VFR BLD AUTO: 36.8 % (ref 35–47)
HCT VFR BLD AUTO: 37.4 % (ref 35–47)
HCT VFR BLD AUTO: 38.6 % (ref 35–47)
HCT VFR BLD AUTO: 42.4 % (ref 35–47)
HGB BLD-MCNC: 10 G/DL (ref 11.7–15.7)
HGB BLD-MCNC: 10.7 G/DL (ref 11.7–15.7)
HGB BLD-MCNC: 10.8 G/DL (ref 11.7–15.7)
HGB BLD-MCNC: 10.9 G/DL (ref 11.7–15.7)
HGB BLD-MCNC: 11.2 G/DL (ref 11.7–15.7)
HGB BLD-MCNC: 11.2 G/DL (ref 11.7–15.7)
HGB BLD-MCNC: 11.4 G/DL (ref 11.7–15.7)
HGB BLD-MCNC: 11.4 G/DL (ref 11.7–15.7)
HGB BLD-MCNC: 11.5 G/DL (ref 11.7–15.7)
HGB BLD-MCNC: 11.6 G/DL (ref 11.7–15.7)
HGB BLD-MCNC: 11.7 G/DL (ref 11.7–15.7)
HGB BLD-MCNC: 12.2 G/DL (ref 11.7–15.7)
HGB BLD-MCNC: 13.5 G/DL (ref 11.7–15.7)
HGB BLD-MCNC: 8.2 G/DL (ref 11.7–15.7)
HGB BLD-MCNC: 8.4 G/DL (ref 11.7–15.7)
HGB BLD-MCNC: 8.7 G/DL (ref 11.7–15.7)
HGB BLD-MCNC: 9.8 G/DL (ref 11.7–15.7)
HGB UR QL STRIP: NEGATIVE
HOLD SPECIMEN: NORMAL
HYALINE CASTS: 1 /LPF
IMM GRANULOCYTES # BLD: 0 10E3/UL
IMM GRANULOCYTES # BLD: 0 10E3/UL
IMM GRANULOCYTES NFR BLD: 0 %
IMM GRANULOCYTES NFR BLD: 0 %
INR PPP: 1.07 (ref 0.9–1.15)
INTERPRETATION ECG - MUSE: NORMAL
INTERPRETATION ECG - MUSE: NORMAL
KETONES UR STRIP-MCNC: NEGATIVE MG/DL
LEUKOCYTE ESTERASE UR QL STRIP: NEGATIVE
LVEF ECHO: NORMAL
LYMPHOCYTES # BLD AUTO: 1.8 10E3/UL (ref 0.8–5.3)
LYMPHOCYTES # BLD AUTO: 1.9 10E3/UL (ref 0.8–5.3)
LYMPHOCYTES # BLD MANUAL: 2.1 10E3/UL (ref 0.8–5.3)
LYMPHOCYTES NFR BLD AUTO: 28 %
LYMPHOCYTES NFR BLD AUTO: 35 %
LYMPHOCYTES NFR BLD MANUAL: 19 %
MAGNESIUM SERPL-MCNC: 1.6 MG/DL (ref 1.7–2.3)
MAGNESIUM SERPL-MCNC: 1.9 MG/DL (ref 1.7–2.3)
MAGNESIUM SERPL-MCNC: 1.9 MG/DL (ref 1.8–2.6)
MAGNESIUM SERPL-MCNC: 2 MG/DL (ref 1.7–2.3)
MAGNESIUM SERPL-MCNC: 2 MG/DL (ref 1.7–2.3)
MAGNESIUM SERPL-MCNC: 2 MG/DL (ref 1.8–2.6)
MAGNESIUM SERPL-MCNC: 2.4 MG/DL (ref 1.7–2.3)
MCH RBC QN AUTO: 28.7 PG (ref 26.5–33)
MCH RBC QN AUTO: 29 PG (ref 26.5–33)
MCH RBC QN AUTO: 30.2 PG (ref 26.5–33)
MCH RBC QN AUTO: 30.3 PG (ref 26.5–33)
MCH RBC QN AUTO: 30.4 PG (ref 26.5–33)
MCH RBC QN AUTO: 30.6 PG (ref 26.5–33)
MCH RBC QN AUTO: 30.7 PG (ref 26.5–33)
MCH RBC QN AUTO: 30.7 PG (ref 26.5–33)
MCH RBC QN AUTO: 30.8 PG (ref 26.5–33)
MCH RBC QN AUTO: 30.8 PG (ref 26.5–33)
MCH RBC QN AUTO: 30.9 PG (ref 26.5–33)
MCH RBC QN AUTO: 31.1 PG (ref 26.5–33)
MCHC RBC AUTO-ENTMCNC: 30.4 G/DL (ref 31.5–36.5)
MCHC RBC AUTO-ENTMCNC: 30.5 G/DL (ref 31.5–36.5)
MCHC RBC AUTO-ENTMCNC: 30.5 G/DL (ref 31.5–36.5)
MCHC RBC AUTO-ENTMCNC: 30.9 G/DL (ref 31.5–36.5)
MCHC RBC AUTO-ENTMCNC: 31 G/DL (ref 31.5–36.5)
MCHC RBC AUTO-ENTMCNC: 31.1 G/DL (ref 31.5–36.5)
MCHC RBC AUTO-ENTMCNC: 31.3 G/DL (ref 31.5–36.5)
MCHC RBC AUTO-ENTMCNC: 31.4 G/DL (ref 31.5–36.5)
MCHC RBC AUTO-ENTMCNC: 31.6 G/DL (ref 31.5–36.5)
MCHC RBC AUTO-ENTMCNC: 31.6 G/DL (ref 31.5–36.5)
MCHC RBC AUTO-ENTMCNC: 31.8 G/DL (ref 31.5–36.5)
MCHC RBC AUTO-ENTMCNC: 31.9 G/DL (ref 31.5–36.5)
MCHC RBC AUTO-ENTMCNC: 32 G/DL (ref 31.5–36.5)
MCHC RBC AUTO-ENTMCNC: 32.5 G/DL (ref 31.5–36.5)
MCV RBC AUTO: 100 FL (ref 78–100)
MCV RBC AUTO: 93 FL (ref 78–100)
MCV RBC AUTO: 95 FL (ref 78–100)
MCV RBC AUTO: 95 FL (ref 78–100)
MCV RBC AUTO: 97 FL (ref 78–100)
MCV RBC AUTO: 98 FL (ref 78–100)
MCV RBC AUTO: 99 FL (ref 78–100)
MCV RBC AUTO: 99 FL (ref 78–100)
METAMYELOCYTES # BLD MANUAL: 0.2 10E3/UL
METAMYELOCYTES NFR BLD MANUAL: 2 %
MONOCYTES # BLD AUTO: 0.6 10E3/UL (ref 0–1.3)
MONOCYTES # BLD AUTO: 0.7 10E3/UL (ref 0–1.3)
MONOCYTES # BLD MANUAL: 0.8 10E3/UL (ref 0–1.3)
MONOCYTES NFR BLD AUTO: 10 %
MONOCYTES NFR BLD AUTO: 11 %
MONOCYTES NFR BLD MANUAL: 7 %
NEUTROPHILS # BLD AUTO: 2.6 10E3/UL (ref 1.6–8.3)
NEUTROPHILS # BLD AUTO: 3.9 10E3/UL (ref 1.6–8.3)
NEUTROPHILS # BLD MANUAL: 8.1 10E3/UL (ref 1.6–8.3)
NEUTROPHILS NFR BLD AUTO: 49 %
NEUTROPHILS NFR BLD AUTO: 55 %
NEUTROPHILS NFR BLD MANUAL: 72 %
NITRATE UR QL: NEGATIVE
NRBC # BLD AUTO: 0 10E3/UL
NRBC # BLD AUTO: 0 10E3/UL
NRBC BLD AUTO-RTO: 0 /100
NRBC BLD AUTO-RTO: 0 /100
NT-PROBNP SERPL-MCNC: 2522 PG/ML (ref 0–1800)
P AXIS - MUSE: 59 DEGREES
P AXIS - MUSE: 95 DEGREES
PH UR STRIP: 6 [PH] (ref 5–7)
PH UR STRIP: 6 [PH] (ref 5–7)
PH UR STRIP: 6.5 [PH] (ref 5–7)
PHOSPHATE SERPL-MCNC: 3.8 MG/DL (ref 2.5–4.5)
PLAT MORPH BLD: ABNORMAL
PLATELET # BLD AUTO: 228 10E3/UL (ref 150–450)
PLATELET # BLD AUTO: 254 10E3/UL (ref 150–450)
PLATELET # BLD AUTO: 268 10E3/UL (ref 150–450)
PLATELET # BLD AUTO: 272 10E3/UL (ref 150–450)
PLATELET # BLD AUTO: 279 10E3/UL (ref 150–450)
PLATELET # BLD AUTO: 304 10E3/UL (ref 150–450)
PLATELET # BLD AUTO: 304 10E3/UL (ref 150–450)
PLATELET # BLD AUTO: 316 10E3/UL (ref 150–450)
PLATELET # BLD AUTO: 329 10E3/UL (ref 150–450)
PLATELET # BLD AUTO: 336 10E3/UL (ref 150–450)
PLATELET # BLD AUTO: 336 10E3/UL (ref 150–450)
PLATELET # BLD AUTO: 340 10E3/UL (ref 150–450)
PLATELET # BLD AUTO: 383 10E3/UL (ref 150–450)
PLATELET # BLD AUTO: 456 10E3/UL (ref 150–450)
PLATELET # BLD AUTO: 473 10E3/UL (ref 150–450)
PLATELET # BLD AUTO: 632 10E3/UL (ref 150–450)
POTASSIUM BLD-SCNC: 4.1 MMOL/L (ref 3.5–5)
POTASSIUM BLD-SCNC: 4.1 MMOL/L (ref 3.5–5)
POTASSIUM BLD-SCNC: 4.2 MMOL/L (ref 3.5–5)
POTASSIUM BLD-SCNC: 4.2 MMOL/L (ref 3.5–5)
POTASSIUM BLD-SCNC: 4.3 MMOL/L (ref 3.5–5)
POTASSIUM BLD-SCNC: 4.3 MMOL/L (ref 3.5–5)
POTASSIUM BLD-SCNC: 4.4 MMOL/L (ref 3.5–5)
POTASSIUM BLD-SCNC: 4.5 MMOL/L (ref 3.5–5)
POTASSIUM BLD-SCNC: 4.9 MMOL/L (ref 3.5–5)
POTASSIUM BLD-SCNC: 4.9 MMOL/L (ref 3.5–5)
POTASSIUM SERPL-SCNC: 3.7 MMOL/L (ref 3.4–5.3)
POTASSIUM SERPL-SCNC: 4 MMOL/L (ref 3.4–5.3)
POTASSIUM SERPL-SCNC: 4.1 MMOL/L (ref 3.4–5.3)
POTASSIUM SERPL-SCNC: 4.2 MMOL/L (ref 3.4–5.3)
POTASSIUM SERPL-SCNC: 5 MMOL/L (ref 3.4–5.3)
PR INTERVAL - MUSE: 142 MS
PR INTERVAL - MUSE: 184 MS
PROT SERPL-MCNC: 5.5 G/DL (ref 6.4–8.3)
PROT SERPL-MCNC: 5.6 G/DL (ref 6–8)
PROT SERPL-MCNC: 6.3 G/DL (ref 6.4–8.3)
PROT SERPL-MCNC: 6.4 G/DL (ref 6.4–8.3)
PROT SERPL-MCNC: 6.5 G/DL (ref 6.4–8.3)
PROT SERPL-MCNC: 6.7 G/DL (ref 6–8)
PROT/CREAT 24H UR: 1.76 MG/MG CR
QRS DURATION - MUSE: 82 MS
QRS DURATION - MUSE: 98 MS
QT - MUSE: 388 MS
QT - MUSE: 390 MS
QTC - MUSE: 433 MS
QTC - MUSE: 455 MS
R AXIS - MUSE: 36 DEGREES
R AXIS - MUSE: 6 DEGREES
RBC # BLD AUTO: 2.83 10E6/UL (ref 3.8–5.2)
RBC # BLD AUTO: 3.3 10E6/UL (ref 3.8–5.2)
RBC # BLD AUTO: 3.44 10E6/UL (ref 3.8–5.2)
RBC # BLD AUTO: 3.54 10E6/UL (ref 3.8–5.2)
RBC # BLD AUTO: 3.62 10E6/UL (ref 3.8–5.2)
RBC # BLD AUTO: 3.75 10E6/UL (ref 3.8–5.2)
RBC # BLD AUTO: 3.75 10E6/UL (ref 3.8–5.2)
RBC # BLD AUTO: 3.76 10E6/UL (ref 3.8–5.2)
RBC # BLD AUTO: 3.76 10E6/UL (ref 3.8–5.2)
RBC # BLD AUTO: 3.77 10E6/UL (ref 3.8–5.2)
RBC # BLD AUTO: 3.79 10E6/UL (ref 3.8–5.2)
RBC # BLD AUTO: 3.86 10E6/UL (ref 3.8–5.2)
RBC # BLD AUTO: 3.96 10E6/UL (ref 3.8–5.2)
RBC # BLD AUTO: 4.34 10E6/UL (ref 3.8–5.2)
RBC MORPH BLD: ABNORMAL
RBC URINE: 0 /HPF
RBC URINE: 0 /HPF
RBC URINE: <1 /HPF
RSV RNA SPEC NAA+PROBE: NEGATIVE
RSV RNA SPEC NAA+PROBE: NEGATIVE
SARS-COV-2 RNA RESP QL NAA+PROBE: NEGATIVE
SODIUM SERPL-SCNC: 126 MMOL/L (ref 136–145)
SODIUM SERPL-SCNC: 136 MMOL/L (ref 136–145)
SODIUM SERPL-SCNC: 137 MMOL/L (ref 136–145)
SODIUM SERPL-SCNC: 138 MMOL/L (ref 136–145)
SODIUM SERPL-SCNC: 139 MMOL/L (ref 136–145)
SODIUM SERPL-SCNC: 140 MMOL/L (ref 136–145)
SODIUM SERPL-SCNC: 141 MMOL/L (ref 136–145)
SODIUM SERPL-SCNC: 141 MMOL/L (ref 136–145)
SODIUM SERPL-SCNC: 142 MMOL/L (ref 136–145)
SODIUM SERPL-SCNC: 147 MMOL/L (ref 136–145)
SP GR UR STRIP: 1.01 (ref 1–1.03)
SPECIMEN EXPIRATION DATE: NORMAL
SQUAMOUS EPITHELIAL: <1 /HPF
SQUAMOUS EPITHELIAL: <1 /HPF
SYSTOLIC BLOOD PRESSURE - MUSE: 197 MMHG
SYSTOLIC BLOOD PRESSURE - MUSE: NORMAL MMHG
T AXIS - MUSE: 138 DEGREES
T AXIS - MUSE: 36 DEGREES
TRICHOMONAS, WET PREP: ABNORMAL
TROPONIN I SERPL-MCNC: 0.18 NG/ML (ref 0–0.29)
TROPONIN T SERPL HS-MCNC: 33 NG/L
TROPONIN T SERPL HS-MCNC: 33 NG/L
TROPONIN T SERPL HS-MCNC: 37 NG/L
UROBILINOGEN UR STRIP-MCNC: <2 MG/DL
UROBILINOGEN UR STRIP-MCNC: <2 MG/DL
UROBILINOGEN UR STRIP-MCNC: NORMAL MG/DL
VENTRICULAR RATE- MUSE: 75 BPM
VENTRICULAR RATE- MUSE: 82 BPM
WBC # BLD AUTO: 10.1 10E3/UL (ref 4–11)
WBC # BLD AUTO: 10.5 10E3/UL (ref 4–11)
WBC # BLD AUTO: 11.3 10E3/UL (ref 4–11)
WBC # BLD AUTO: 12.7 10E3/UL (ref 4–11)
WBC # BLD AUTO: 5.2 10E3/UL (ref 4–11)
WBC # BLD AUTO: 6 10E3/UL (ref 4–11)
WBC # BLD AUTO: 6.4 10E3/UL (ref 4–11)
WBC # BLD AUTO: 6.4 10E3/UL (ref 4–11)
WBC # BLD AUTO: 7 10E3/UL (ref 4–11)
WBC # BLD AUTO: 7.2 10E3/UL (ref 4–11)
WBC # BLD AUTO: 7.5 10E3/UL (ref 4–11)
WBC # BLD AUTO: 8.1 10E3/UL (ref 4–11)
WBC # BLD AUTO: 8.5 10E3/UL (ref 4–11)
WBC # BLD AUTO: 9.1 10E3/UL (ref 4–11)
WBC URINE: 2 /HPF
WBC URINE: 4 /HPF
WBC URINE: 5 /HPF
WBC'S/HIGH POWER FIELD, WET PREP: ABNORMAL
YEAST, WET PREP: PRESENT

## 2022-01-01 PROCEDURE — 710N000009 HC RECOVERY PHASE 1, LEVEL 1, PER MIN: Performed by: STUDENT IN AN ORGANIZED HEALTH CARE EDUCATION/TRAINING PROGRAM

## 2022-01-01 PROCEDURE — 96361 HYDRATE IV INFUSION ADD-ON: CPT

## 2022-01-01 PROCEDURE — 72170 X-RAY EXAM OF PELVIS: CPT

## 2022-01-01 PROCEDURE — 85027 COMPLETE CBC AUTOMATED: CPT | Mod: ORL | Performed by: NURSE PRACTITIONER

## 2022-01-01 PROCEDURE — 87635 SARS-COV-2 COVID-19 AMP PRB: CPT | Performed by: INTERNAL MEDICINE

## 2022-01-01 PROCEDURE — 97535 SELF CARE MNGMENT TRAINING: CPT | Mod: GO

## 2022-01-01 PROCEDURE — 99232 SBSQ HOSP IP/OBS MODERATE 35: CPT | Performed by: INTERNAL MEDICINE

## 2022-01-01 PROCEDURE — 83735 ASSAY OF MAGNESIUM: CPT | Performed by: INTERNAL MEDICINE

## 2022-01-01 PROCEDURE — P9604 ONE-WAY ALLOW PRORATED TRIP: HCPCS | Mod: ORL | Performed by: FAMILY MEDICINE

## 2022-01-01 PROCEDURE — 87637 SARSCOV2&INF A&B&RSV AMP PRB: CPT | Performed by: EMERGENCY MEDICINE

## 2022-01-01 PROCEDURE — 97161 PT EVAL LOW COMPLEX 20 MIN: CPT | Mod: GP

## 2022-01-01 PROCEDURE — 250N000013 HC RX MED GY IP 250 OP 250 PS 637: Performed by: INTERNAL MEDICINE

## 2022-01-01 PROCEDURE — 80048 BASIC METABOLIC PNL TOTAL CA: CPT | Performed by: INTERNAL MEDICINE

## 2022-01-01 PROCEDURE — 87210 SMEAR WET MOUNT SALINE/INK: CPT | Performed by: EMERGENCY MEDICINE

## 2022-01-01 PROCEDURE — 82310 ASSAY OF CALCIUM: CPT | Performed by: INTERNAL MEDICINE

## 2022-01-01 PROCEDURE — 99223 1ST HOSP IP/OBS HIGH 75: CPT | Mod: AI | Performed by: INTERNAL MEDICINE

## 2022-01-01 PROCEDURE — C9803 HOPD COVID-19 SPEC COLLECT: HCPCS

## 2022-01-01 PROCEDURE — 97530 THERAPEUTIC ACTIVITIES: CPT | Mod: GP | Performed by: PHYSICAL THERAPIST

## 2022-01-01 PROCEDURE — 36415 COLL VENOUS BLD VENIPUNCTURE: CPT | Performed by: STUDENT IN AN ORGANIZED HEALTH CARE EDUCATION/TRAINING PROGRAM

## 2022-01-01 PROCEDURE — 250N000009 HC RX 250: Performed by: EMERGENCY MEDICINE

## 2022-01-01 PROCEDURE — 81001 URINALYSIS AUTO W/SCOPE: CPT | Performed by: EMERGENCY MEDICINE

## 2022-01-01 PROCEDURE — 83735 ASSAY OF MAGNESIUM: CPT | Performed by: EMERGENCY MEDICINE

## 2022-01-01 PROCEDURE — 97110 THERAPEUTIC EXERCISES: CPT | Mod: GO

## 2022-01-01 PROCEDURE — 80053 COMPREHEN METABOLIC PANEL: CPT | Mod: ORL | Performed by: FAMILY MEDICINE

## 2022-01-01 PROCEDURE — 99219 PR INITIAL OBSERVATION CARE,LEVEL II: CPT | Mod: GC

## 2022-01-01 PROCEDURE — 83735 ASSAY OF MAGNESIUM: CPT | Mod: ORL | Performed by: FAMILY MEDICINE

## 2022-01-01 PROCEDURE — 84484 ASSAY OF TROPONIN QUANT: CPT | Performed by: EMERGENCY MEDICINE

## 2022-01-01 PROCEDURE — 86901 BLOOD TYPING SEROLOGIC RH(D): CPT | Performed by: EMERGENCY MEDICINE

## 2022-01-01 PROCEDURE — 97166 OT EVAL MOD COMPLEX 45 MIN: CPT | Mod: GO

## 2022-01-01 PROCEDURE — 210N000001 HC R&B IMCU HEART CARE

## 2022-01-01 PROCEDURE — 72128 CT CHEST SPINE W/O DYE: CPT

## 2022-01-01 PROCEDURE — 97129 THER IVNTJ 1ST 15 MIN: CPT | Mod: GO

## 2022-01-01 PROCEDURE — 97110 THERAPEUTIC EXERCISES: CPT | Mod: GP

## 2022-01-01 PROCEDURE — 83880 ASSAY OF NATRIURETIC PEPTIDE: CPT | Performed by: EMERGENCY MEDICINE

## 2022-01-01 PROCEDURE — 97530 THERAPEUTIC ACTIVITIES: CPT | Mod: GP

## 2022-01-01 PROCEDURE — 120N000001 HC R&B MED SURG/OB

## 2022-01-01 PROCEDURE — 87635 SARS-COV-2 COVID-19 AMP PRB: CPT | Performed by: EMERGENCY MEDICINE

## 2022-01-01 PROCEDURE — 85027 COMPLETE CBC AUTOMATED: CPT | Mod: ORL | Performed by: FAMILY MEDICINE

## 2022-01-01 PROCEDURE — 99316 NF DSCHRG MGMT 30 MIN+: CPT | Performed by: FAMILY MEDICINE

## 2022-01-01 PROCEDURE — 258N000003 HC RX IP 258 OP 636: Performed by: INTERNAL MEDICINE

## 2022-01-01 PROCEDURE — P9604 ONE-WAY ALLOW PRORATED TRIP: HCPCS | Mod: ORL | Performed by: NURSE PRACTITIONER

## 2022-01-01 PROCEDURE — 85018 HEMOGLOBIN: CPT | Performed by: STUDENT IN AN ORGANIZED HEALTH CARE EDUCATION/TRAINING PROGRAM

## 2022-01-01 PROCEDURE — 87651 STREP A DNA AMP PROBE: CPT | Performed by: EMERGENCY MEDICINE

## 2022-01-01 PROCEDURE — 85018 HEMOGLOBIN: CPT | Performed by: INTERNAL MEDICINE

## 2022-01-01 PROCEDURE — 99304 1ST NF CARE SF/LOW MDM 25: CPT | Performed by: NURSE PRACTITIONER

## 2022-01-01 PROCEDURE — 85610 PROTHROMBIN TIME: CPT | Performed by: INTERNAL MEDICINE

## 2022-01-01 PROCEDURE — 250N000011 HC RX IP 250 OP 636: Performed by: NURSE ANESTHETIST, CERTIFIED REGISTERED

## 2022-01-01 PROCEDURE — 93321 DOPPLER ECHO F-UP/LMTD STD: CPT

## 2022-01-01 PROCEDURE — 370N000017 HC ANESTHESIA TECHNICAL FEE, PER MIN: Performed by: STUDENT IN AN ORGANIZED HEALTH CARE EDUCATION/TRAINING PROGRAM

## 2022-01-01 PROCEDURE — 85049 AUTOMATED PLATELET COUNT: CPT | Performed by: STUDENT IN AN ORGANIZED HEALTH CARE EDUCATION/TRAINING PROGRAM

## 2022-01-01 PROCEDURE — 93005 ELECTROCARDIOGRAM TRACING: CPT | Performed by: EMERGENCY MEDICINE

## 2022-01-01 PROCEDURE — 85027 COMPLETE CBC AUTOMATED: CPT | Performed by: INTERNAL MEDICINE

## 2022-01-01 PROCEDURE — 96374 THER/PROPH/DIAG INJ IV PUSH: CPT

## 2022-01-01 PROCEDURE — 36415 COLL VENOUS BLD VENIPUNCTURE: CPT | Mod: ORL | Performed by: NURSE PRACTITIONER

## 2022-01-01 PROCEDURE — 99316 NF DSCHRG MGMT 30 MIN+: CPT | Performed by: NURSE PRACTITIONER

## 2022-01-01 PROCEDURE — 250N000011 HC RX IP 250 OP 636: Performed by: STUDENT IN AN ORGANIZED HEALTH CARE EDUCATION/TRAINING PROGRAM

## 2022-01-01 PROCEDURE — 71045 X-RAY EXAM CHEST 1 VIEW: CPT

## 2022-01-01 PROCEDURE — 80048 BASIC METABOLIC PNL TOTAL CA: CPT | Performed by: EMERGENCY MEDICINE

## 2022-01-01 PROCEDURE — 84156 ASSAY OF PROTEIN URINE: CPT | Performed by: FAMILY MEDICINE

## 2022-01-01 PROCEDURE — 36415 COLL VENOUS BLD VENIPUNCTURE: CPT | Performed by: INTERNAL MEDICINE

## 2022-01-01 PROCEDURE — 99309 SBSQ NF CARE MODERATE MDM 30: CPT | Performed by: FAMILY MEDICINE

## 2022-01-01 PROCEDURE — 85049 AUTOMATED PLATELET COUNT: CPT | Performed by: INTERNAL MEDICINE

## 2022-01-01 PROCEDURE — C1776 JOINT DEVICE (IMPLANTABLE): HCPCS | Performed by: STUDENT IN AN ORGANIZED HEALTH CARE EDUCATION/TRAINING PROGRAM

## 2022-01-01 PROCEDURE — 96365 THER/PROPH/DIAG IV INF INIT: CPT

## 2022-01-01 PROCEDURE — 250N000011 HC RX IP 250 OP 636: Performed by: EMERGENCY MEDICINE

## 2022-01-01 PROCEDURE — 250N000013 HC RX MED GY IP 250 OP 250 PS 637

## 2022-01-01 PROCEDURE — 250N000013 HC RX MED GY IP 250 OP 250 PS 637: Performed by: EMERGENCY MEDICINE

## 2022-01-01 PROCEDURE — 99309 SBSQ NF CARE MODERATE MDM 30: CPT | Performed by: NURSE PRACTITIONER

## 2022-01-01 PROCEDURE — 85027 COMPLETE CBC AUTOMATED: CPT | Performed by: EMERGENCY MEDICINE

## 2022-01-01 PROCEDURE — 97165 OT EVAL LOW COMPLEX 30 MIN: CPT | Mod: GO

## 2022-01-01 PROCEDURE — 360N000077 HC SURGERY LEVEL 4, PER MIN: Performed by: STUDENT IN AN ORGANIZED HEALTH CARE EDUCATION/TRAINING PROGRAM

## 2022-01-01 PROCEDURE — 36415 COLL VENOUS BLD VENIPUNCTURE: CPT | Performed by: EMERGENCY MEDICINE

## 2022-01-01 PROCEDURE — 80053 COMPREHEN METABOLIC PANEL: CPT | Performed by: INTERNAL MEDICINE

## 2022-01-01 PROCEDURE — 99233 SBSQ HOSP IP/OBS HIGH 50: CPT | Performed by: INTERNAL MEDICINE

## 2022-01-01 PROCEDURE — 36415 COLL VENOUS BLD VENIPUNCTURE: CPT | Mod: ORL | Performed by: FAMILY MEDICINE

## 2022-01-01 PROCEDURE — 51798 US URINE CAPACITY MEASURE: CPT

## 2022-01-01 PROCEDURE — 250N000011 HC RX IP 250 OP 636: Performed by: INTERNAL MEDICINE

## 2022-01-01 PROCEDURE — 81001 URINALYSIS AUTO W/SCOPE: CPT | Mod: ORL | Performed by: FAMILY MEDICINE

## 2022-01-01 PROCEDURE — 82374 ASSAY BLOOD CARBON DIOXIDE: CPT | Performed by: INTERNAL MEDICINE

## 2022-01-01 PROCEDURE — 99239 HOSP IP/OBS DSCHRG MGMT >30: CPT | Performed by: INTERNAL MEDICINE

## 2022-01-01 PROCEDURE — P9603 ONE-WAY ALLOW PRORATED MILES: HCPCS | Mod: ORL | Performed by: FAMILY MEDICINE

## 2022-01-01 PROCEDURE — 97110 THERAPEUTIC EXERCISES: CPT | Mod: GP | Performed by: PHYSICAL THERAPIST

## 2022-01-01 PROCEDURE — 99310 SBSQ NF CARE HIGH MDM 45: CPT | Performed by: NURSE PRACTITIONER

## 2022-01-01 PROCEDURE — 82570 ASSAY OF URINE CREATININE: CPT | Performed by: FAMILY MEDICINE

## 2022-01-01 PROCEDURE — 999N000065 XR PELVIS AND HIP LEFT 2 VIEWS

## 2022-01-01 PROCEDURE — 258N000003 HC RX IP 258 OP 636: Performed by: NURSE ANESTHETIST, CERTIFIED REGISTERED

## 2022-01-01 PROCEDURE — 83735 ASSAY OF MAGNESIUM: CPT | Performed by: FAMILY MEDICINE

## 2022-01-01 PROCEDURE — 85379 FIBRIN DEGRADATION QUANT: CPT | Performed by: EMERGENCY MEDICINE

## 2022-01-01 PROCEDURE — 94640 AIRWAY INHALATION TREATMENT: CPT

## 2022-01-01 PROCEDURE — 85027 COMPLETE CBC AUTOMATED: CPT

## 2022-01-01 PROCEDURE — 80048 BASIC METABOLIC PNL TOTAL CA: CPT | Mod: ORL | Performed by: NURSE PRACTITIONER

## 2022-01-01 PROCEDURE — 85025 COMPLETE CBC W/AUTO DIFF WBC: CPT | Performed by: EMERGENCY MEDICINE

## 2022-01-01 PROCEDURE — 93325 DOPPLER ECHO COLOR FLOW MAPG: CPT | Mod: 26 | Performed by: INTERNAL MEDICINE

## 2022-01-01 PROCEDURE — 80053 COMPREHEN METABOLIC PANEL: CPT | Performed by: EMERGENCY MEDICINE

## 2022-01-01 PROCEDURE — 99284 EMERGENCY DEPT VISIT MOD MDM: CPT | Mod: 25

## 2022-01-01 PROCEDURE — 72125 CT NECK SPINE W/O DYE: CPT

## 2022-01-01 PROCEDURE — 250N000013 HC RX MED GY IP 250 OP 250 PS 637: Performed by: STUDENT IN AN ORGANIZED HEALTH CARE EDUCATION/TRAINING PROGRAM

## 2022-01-01 PROCEDURE — 250N000011 HC RX IP 250 OP 636: Performed by: ANESTHESIOLOGY

## 2022-01-01 PROCEDURE — 99285 EMERGENCY DEPT VISIT HI MDM: CPT | Mod: CS,25

## 2022-01-01 PROCEDURE — 72131 CT LUMBAR SPINE W/O DYE: CPT

## 2022-01-01 PROCEDURE — 272N000001 HC OR GENERAL SUPPLY STERILE: Performed by: STUDENT IN AN ORGANIZED HEALTH CARE EDUCATION/TRAINING PROGRAM

## 2022-01-01 PROCEDURE — 80069 RENAL FUNCTION PANEL: CPT | Performed by: FAMILY MEDICINE

## 2022-01-01 PROCEDURE — 97162 PT EVAL MOD COMPLEX 30 MIN: CPT | Mod: GP | Performed by: PHYSICAL THERAPIST

## 2022-01-01 PROCEDURE — 250N000009 HC RX 250: Performed by: NURSE ANESTHETIST, CERTIFIED REGISTERED

## 2022-01-01 PROCEDURE — 93321 DOPPLER ECHO F-UP/LMTD STD: CPT | Mod: 26 | Performed by: INTERNAL MEDICINE

## 2022-01-01 PROCEDURE — 258N000003 HC RX IP 258 OP 636: Performed by: EMERGENCY MEDICINE

## 2022-01-01 PROCEDURE — 83880 ASSAY OF NATRIURETIC PEPTIDE: CPT | Mod: ORL | Performed by: FAMILY MEDICINE

## 2022-01-01 PROCEDURE — 278N000051 HC OR IMPLANT GENERAL: Performed by: STUDENT IN AN ORGANIZED HEALTH CARE EDUCATION/TRAINING PROGRAM

## 2022-01-01 PROCEDURE — 99285 EMERGENCY DEPT VISIT HI MDM: CPT | Mod: 25

## 2022-01-01 PROCEDURE — 258N000001 HC RX 258: Performed by: STUDENT IN AN ORGANIZED HEALTH CARE EDUCATION/TRAINING PROGRAM

## 2022-01-01 PROCEDURE — 73552 X-RAY EXAM OF FEMUR 2/>: CPT | Mod: LT

## 2022-01-01 PROCEDURE — 85007 BL SMEAR W/DIFF WBC COUNT: CPT | Performed by: EMERGENCY MEDICINE

## 2022-01-01 PROCEDURE — 82248 BILIRUBIN DIRECT: CPT | Performed by: EMERGENCY MEDICINE

## 2022-01-01 PROCEDURE — 70450 CT HEAD/BRAIN W/O DYE: CPT

## 2022-01-01 PROCEDURE — 99223 1ST HOSP IP/OBS HIGH 75: CPT | Performed by: INTERNAL MEDICINE

## 2022-01-01 PROCEDURE — G0378 HOSPITAL OBSERVATION PER HR: HCPCS

## 2022-01-01 PROCEDURE — 93308 TTE F-UP OR LMTD: CPT | Mod: 26 | Performed by: INTERNAL MEDICINE

## 2022-01-01 PROCEDURE — 99207 PR APP CREDIT; MD BILLING SHARED VISIT: CPT | Performed by: INTERNAL MEDICINE

## 2022-01-01 PROCEDURE — 97116 GAIT TRAINING THERAPY: CPT | Mod: GP

## 2022-01-01 PROCEDURE — 96375 TX/PRO/DX INJ NEW DRUG ADDON: CPT

## 2022-01-01 PROCEDURE — 999N000141 HC STATISTIC PRE-PROCEDURE NURSING ASSESSMENT: Performed by: STUDENT IN AN ORGANIZED HEALTH CARE EDUCATION/TRAINING PROGRAM

## 2022-01-01 PROCEDURE — 84484 ASSAY OF TROPONIN QUANT: CPT | Performed by: INTERNAL MEDICINE

## 2022-01-01 PROCEDURE — 82565 ASSAY OF CREATININE: CPT | Performed by: STUDENT IN AN ORGANIZED HEALTH CARE EDUCATION/TRAINING PROGRAM

## 2022-01-01 PROCEDURE — 250N000009 HC RX 250: Performed by: STUDENT IN AN ORGANIZED HEALTH CARE EDUCATION/TRAINING PROGRAM

## 2022-01-01 PROCEDURE — 97530 THERAPEUTIC ACTIVITIES: CPT | Mod: GO

## 2022-01-01 PROCEDURE — 36415 COLL VENOUS BLD VENIPUNCTURE: CPT

## 2022-01-01 PROCEDURE — 80048 BASIC METABOLIC PNL TOTAL CA: CPT | Performed by: FAMILY MEDICINE

## 2022-01-01 PROCEDURE — 0SRS019 REPLACEMENT OF LEFT HIP JOINT, FEMORAL SURFACE WITH METAL SYNTHETIC SUBSTITUTE, CEMENTED, OPEN APPROACH: ICD-10-PCS | Performed by: STUDENT IN AN ORGANIZED HEALTH CARE EDUCATION/TRAINING PROGRAM

## 2022-01-01 PROCEDURE — 80048 BASIC METABOLIC PNL TOTAL CA: CPT

## 2022-01-01 PROCEDURE — 93325 DOPPLER ECHO COLOR FLOW MAPG: CPT

## 2022-01-01 DEVICE — TOBRA FULL DOSE ANTIBIOTIC BONE CEMENT, 10 PACK CATALOG NUMBER IS 6197-9-010
Type: IMPLANTABLE DEVICE | Site: HIP | Status: FUNCTIONAL
Brand: SIMPLEX

## 2022-01-01 DEVICE — PLUG CEMENT BUCK W/INSERT 18.5 71279422: Type: IMPLANTABLE DEVICE | Site: HIP | Status: FUNCTIONAL

## 2022-01-01 DEVICE — IMP STEM CENTRALIZER DEPUY 9.25MM 1376-47-000: Type: IMPLANTABLE DEVICE | Site: HIP | Status: FUNCTIONAL

## 2022-01-01 DEVICE — IMPLANTABLE DEVICE: Type: IMPLANTABLE DEVICE | Site: HIP | Status: FUNCTIONAL

## 2022-01-01 DEVICE — ARTICUL/EZE FEMORAL HEAD DIAMETER 28MM +1.5 12/14 TAPER
Type: IMPLANTABLE DEVICE | Site: HIP | Status: FUNCTIONAL
Brand: ARTICUL/EZE

## 2022-01-01 RX ORDER — HYDRALAZINE HYDROCHLORIDE 25 MG/1
25 TABLET, FILM COATED ORAL EVERY 6 HOURS PRN
Status: DISCONTINUED | OUTPATIENT
Start: 2022-01-01 | End: 2022-01-01 | Stop reason: HOSPADM

## 2022-01-01 RX ORDER — ALPRAZOLAM 0.25 MG
0.25 TABLET ORAL 2 TIMES DAILY PRN
Status: DISCONTINUED | OUTPATIENT
Start: 2022-01-01 | End: 2022-01-01 | Stop reason: HOSPADM

## 2022-01-01 RX ORDER — TRAMADOL HYDROCHLORIDE 50 MG/1
25 TABLET ORAL EVERY 6 HOURS PRN
Qty: 15 TABLET | Refills: 0 | Status: SHIPPED | OUTPATIENT
Start: 2022-01-01 | End: 2022-01-01

## 2022-01-01 RX ORDER — AMOXICILLIN 250 MG
1 CAPSULE ORAL 2 TIMES DAILY
Status: DISCONTINUED | OUTPATIENT
Start: 2022-01-01 | End: 2022-01-01 | Stop reason: HOSPADM

## 2022-01-01 RX ORDER — MAGNESIUM HYDROXIDE 1200 MG/15ML
LIQUID ORAL PRN
Status: DISCONTINUED | OUTPATIENT
Start: 2022-01-01 | End: 2022-01-01 | Stop reason: HOSPADM

## 2022-01-01 RX ORDER — POLYETHYLENE GLYCOL 3350 17 G/17G
17 POWDER, FOR SOLUTION ORAL DAILY PRN
Status: DISCONTINUED | OUTPATIENT
Start: 2022-01-01 | End: 2022-01-01 | Stop reason: HOSPADM

## 2022-01-01 RX ORDER — METHYLPREDNISOLONE SODIUM SUCCINATE 125 MG/2ML
125 INJECTION, POWDER, LYOPHILIZED, FOR SOLUTION INTRAMUSCULAR; INTRAVENOUS ONCE
Status: COMPLETED | OUTPATIENT
Start: 2022-01-01 | End: 2022-01-01

## 2022-01-01 RX ORDER — TRAMADOL HYDROCHLORIDE 50 MG/1
50 TABLET ORAL ONCE
Status: COMPLETED | OUTPATIENT
Start: 2022-01-01 | End: 2022-01-01

## 2022-01-01 RX ORDER — LIDOCAINE 40 MG/G
CREAM TOPICAL
Status: DISCONTINUED | OUTPATIENT
Start: 2022-01-01 | End: 2022-01-01 | Stop reason: HOSPADM

## 2022-01-01 RX ORDER — BISACODYL 10 MG
10 SUPPOSITORY, RECTAL RECTAL DAILY PRN
Status: DISCONTINUED | OUTPATIENT
Start: 2022-01-01 | End: 2022-01-01 | Stop reason: HOSPADM

## 2022-01-01 RX ORDER — MIRTAZAPINE 7.5 MG/1
7.5 TABLET, FILM COATED ORAL AT BEDTIME
Status: DISCONTINUED | OUTPATIENT
Start: 2022-01-01 | End: 2022-01-01 | Stop reason: HOSPADM

## 2022-01-01 RX ORDER — QUETIAPINE FUMARATE 25 MG/1
25 TABLET, FILM COATED ORAL AT BEDTIME
Status: DISCONTINUED | OUTPATIENT
Start: 2022-01-01 | End: 2022-01-01 | Stop reason: HOSPADM

## 2022-01-01 RX ORDER — DILTIAZEM HYDROCHLORIDE 120 MG/1
120 CAPSULE, COATED, EXTENDED RELEASE ORAL DAILY
Status: DISCONTINUED | OUTPATIENT
Start: 2022-01-01 | End: 2022-01-01 | Stop reason: HOSPADM

## 2022-01-01 RX ORDER — OXYCODONE HYDROCHLORIDE 5 MG/1
5 TABLET ORAL EVERY 4 HOURS PRN
Status: DISCONTINUED | OUTPATIENT
Start: 2022-01-01 | End: 2022-01-01 | Stop reason: HOSPADM

## 2022-01-01 RX ORDER — DILTIAZEM HYDROCHLORIDE 90 MG/1
90 CAPSULE, EXTENDED RELEASE ORAL DAILY
Status: DISCONTINUED | OUTPATIENT
Start: 2022-01-01 | End: 2022-01-01

## 2022-01-01 RX ORDER — NALOXONE HYDROCHLORIDE 0.4 MG/ML
0.2 INJECTION, SOLUTION INTRAMUSCULAR; INTRAVENOUS; SUBCUTANEOUS
Status: DISCONTINUED | OUTPATIENT
Start: 2022-01-01 | End: 2022-01-01 | Stop reason: HOSPADM

## 2022-01-01 RX ORDER — MIRTAZAPINE 7.5 MG/1
7.5 TABLET, FILM COATED ORAL AT BEDTIME
Start: 2022-01-01 | End: 2022-01-01 | Stop reason: SINTOL

## 2022-01-01 RX ORDER — HEPARIN SODIUM 5000 [USP'U]/.5ML
5000 INJECTION, SOLUTION INTRAVENOUS; SUBCUTANEOUS EVERY 12 HOURS
Status: DISCONTINUED | OUTPATIENT
Start: 2022-01-01 | End: 2022-01-01 | Stop reason: HOSPADM

## 2022-01-01 RX ORDER — NALOXONE HYDROCHLORIDE 0.4 MG/ML
0.4 INJECTION, SOLUTION INTRAMUSCULAR; INTRAVENOUS; SUBCUTANEOUS
Status: DISCONTINUED | OUTPATIENT
Start: 2022-01-01 | End: 2022-01-01 | Stop reason: HOSPADM

## 2022-01-01 RX ORDER — FENTANYL CITRATE 50 UG/ML
25 INJECTION, SOLUTION INTRAMUSCULAR; INTRAVENOUS EVERY 5 MIN PRN
Status: DISCONTINUED | OUTPATIENT
Start: 2022-01-01 | End: 2022-01-01 | Stop reason: HOSPADM

## 2022-01-01 RX ORDER — ACETAMINOPHEN 325 MG/1
975 TABLET ORAL 3 TIMES DAILY
Status: DISCONTINUED | OUTPATIENT
Start: 2022-01-01 | End: 2022-01-01 | Stop reason: HOSPADM

## 2022-01-01 RX ORDER — CLOTRIMAZOLE 10 MG/1
1 LOZENGE ORAL
Status: DISCONTINUED | OUTPATIENT
Start: 2022-01-01 | End: 2022-01-01 | Stop reason: HOSPADM

## 2022-01-01 RX ORDER — CEFAZOLIN SODIUM 2 G/100ML
2 INJECTION, SOLUTION INTRAVENOUS SEE ADMIN INSTRUCTIONS
Status: DISCONTINUED | OUTPATIENT
Start: 2022-01-01 | End: 2022-01-01 | Stop reason: HOSPADM

## 2022-01-01 RX ORDER — AMOXICILLIN 250 MG
1 CAPSULE ORAL 2 TIMES DAILY
DISCHARGE
Start: 2022-01-01

## 2022-01-01 RX ORDER — ONDANSETRON 2 MG/ML
INJECTION INTRAMUSCULAR; INTRAVENOUS PRN
Status: DISCONTINUED | OUTPATIENT
Start: 2022-01-01 | End: 2022-01-01

## 2022-01-01 RX ORDER — KETAMINE HYDROCHLORIDE 10 MG/ML
INJECTION INTRAMUSCULAR; INTRAVENOUS PRN
Status: DISCONTINUED | OUTPATIENT
Start: 2022-01-01 | End: 2022-01-01

## 2022-01-01 RX ORDER — PROCHLORPERAZINE MALEATE 5 MG
5 TABLET ORAL EVERY 6 HOURS PRN
Status: DISCONTINUED | OUTPATIENT
Start: 2022-01-01 | End: 2022-01-01 | Stop reason: HOSPADM

## 2022-01-01 RX ORDER — PROCHLORPERAZINE 25 MG
12.5 SUPPOSITORY, RECTAL RECTAL EVERY 12 HOURS PRN
Status: DISCONTINUED | OUTPATIENT
Start: 2022-01-01 | End: 2022-01-01 | Stop reason: HOSPADM

## 2022-01-01 RX ORDER — ACETAMINOPHEN 325 MG/1
650 TABLET ORAL EVERY 6 HOURS PRN
Status: DISCONTINUED | OUTPATIENT
Start: 2022-01-01 | End: 2022-01-01 | Stop reason: HOSPADM

## 2022-01-01 RX ORDER — ACETAMINOPHEN 325 MG/1
975 TABLET ORAL 3 TIMES DAILY
Status: DISCONTINUED | OUTPATIENT
Start: 2022-01-01 | End: 2022-01-01

## 2022-01-01 RX ORDER — LIDOCAINE HYDROCHLORIDE 10 MG/ML
INJECTION, SOLUTION INFILTRATION; PERINEURAL PRN
Status: DISCONTINUED | OUTPATIENT
Start: 2022-01-01 | End: 2022-01-01

## 2022-01-01 RX ORDER — DILTIAZEM HYDROCHLORIDE 120 MG/1
120 CAPSULE, COATED, EXTENDED RELEASE ORAL DAILY
Status: DISCONTINUED | OUTPATIENT
Start: 2022-01-01 | End: 2022-01-01

## 2022-01-01 RX ORDER — BUPROPION HYDROCHLORIDE 150 MG/1
150 TABLET ORAL DAILY
Status: DISCONTINUED | OUTPATIENT
Start: 2022-01-01 | End: 2022-01-01 | Stop reason: ALTCHOICE

## 2022-01-01 RX ORDER — POLYETHYLENE GLYCOL 3350 17 G/17G
34 POWDER, FOR SOLUTION ORAL ONCE
Status: COMPLETED | OUTPATIENT
Start: 2022-01-01 | End: 2022-01-01

## 2022-01-01 RX ORDER — SODIUM BICARBONATE 650 MG/1
650 TABLET ORAL 2 TIMES DAILY
Status: DISCONTINUED | OUTPATIENT
Start: 2022-01-01 | End: 2022-01-01

## 2022-01-01 RX ORDER — TRANEXAMIC ACID 650 MG/1
1950 TABLET ORAL ONCE
Status: DISCONTINUED | OUTPATIENT
Start: 2022-01-01 | End: 2022-01-01 | Stop reason: HOSPADM

## 2022-01-01 RX ORDER — HYDROMORPHONE HYDROCHLORIDE 1 MG/ML
0.2 INJECTION, SOLUTION INTRAMUSCULAR; INTRAVENOUS; SUBCUTANEOUS EVERY 5 MIN PRN
Status: DISCONTINUED | OUTPATIENT
Start: 2022-01-01 | End: 2022-01-01 | Stop reason: HOSPADM

## 2022-01-01 RX ORDER — OXYCODONE HYDROCHLORIDE 5 MG/1
2.5-5 TABLET ORAL EVERY 4 HOURS PRN
Qty: 30 TABLET | Refills: 0 | Status: SHIPPED | OUTPATIENT
Start: 2022-01-01 | End: 2022-01-01

## 2022-01-01 RX ORDER — ESCITALOPRAM OXALATE 10 MG/1
10 TABLET ORAL DAILY
Status: DISCONTINUED | OUTPATIENT
Start: 2022-01-01 | End: 2022-01-01 | Stop reason: HOSPADM

## 2022-01-01 RX ORDER — DILTIAZEM HYDROCHLORIDE 120 MG/1
120 CAPSULE, COATED, EXTENDED RELEASE ORAL DAILY
COMMUNITY
Start: 2022-01-01 | End: 2022-01-01

## 2022-01-01 RX ORDER — DILTIAZEM HYDROCHLORIDE 60 MG/1
120 CAPSULE, EXTENDED RELEASE ORAL DAILY
Status: DISCONTINUED | OUTPATIENT
Start: 2022-01-01 | End: 2022-01-01

## 2022-01-01 RX ORDER — DORZOLAMIDE HYDROCHLORIDE AND TIMOLOL MALEATE PRESERVATIVE FREE 20; 5 MG/ML; MG/ML
SOLUTION/ DROPS OPHTHALMIC
Qty: 15 EACH | Refills: 0 | Status: SHIPPED | OUTPATIENT
Start: 2022-01-01

## 2022-01-01 RX ORDER — ACETAMINOPHEN 325 MG/1
975 TABLET ORAL 3 TIMES DAILY
COMMUNITY

## 2022-01-01 RX ORDER — POLYETHYLENE GLYCOL 3350 17 G/17G
17 POWDER, FOR SOLUTION ORAL DAILY
Status: DISCONTINUED | OUTPATIENT
Start: 2022-01-01 | End: 2022-01-01 | Stop reason: HOSPADM

## 2022-01-01 RX ORDER — DILTIAZEM HYDROCHLORIDE 60 MG/1
60 CAPSULE, EXTENDED RELEASE ORAL ONCE
Status: COMPLETED | OUTPATIENT
Start: 2022-01-01 | End: 2022-01-01

## 2022-01-01 RX ORDER — DORZOLAMIDE HYDROCHLORIDE AND TIMOLOL MALEATE 20; 5 MG/ML; MG/ML
1 SOLUTION/ DROPS OPHTHALMIC 2 TIMES DAILY
Status: DISCONTINUED | OUTPATIENT
Start: 2022-01-01 | End: 2022-01-01 | Stop reason: HOSPADM

## 2022-01-01 RX ORDER — SODIUM CHLORIDE, SODIUM LACTATE, POTASSIUM CHLORIDE, CALCIUM CHLORIDE 600; 310; 30; 20 MG/100ML; MG/100ML; MG/100ML; MG/100ML
INJECTION, SOLUTION INTRAVENOUS CONTINUOUS
Status: DISCONTINUED | OUTPATIENT
Start: 2022-01-01 | End: 2022-01-01 | Stop reason: HOSPADM

## 2022-01-01 RX ORDER — MIRTAZAPINE 7.5 MG/1
7.5 TABLET, FILM COATED ORAL AT BEDTIME
COMMUNITY

## 2022-01-01 RX ORDER — QUETIAPINE FUMARATE 25 MG/1
12.5 TABLET, FILM COATED ORAL 2 TIMES DAILY PRN
Start: 2022-01-01 | End: 2022-01-01

## 2022-01-01 RX ORDER — ACETAMINOPHEN 500 MG
1000 TABLET ORAL 2 TIMES DAILY
Status: ON HOLD | COMMUNITY
End: 2022-01-01

## 2022-01-01 RX ORDER — BISACODYL 10 MG
10 SUPPOSITORY, RECTAL RECTAL DAILY PRN
Start: 2022-01-01 | End: 2022-01-01

## 2022-01-01 RX ORDER — BUPROPION HYDROCHLORIDE 150 MG/1
150 TABLET ORAL DAILY
Status: ON HOLD | COMMUNITY
Start: 2022-01-01 | End: 2022-01-01

## 2022-01-01 RX ORDER — NYSTATIN 100000/ML
500000 SUSPENSION, ORAL (FINAL DOSE FORM) ORAL ONCE
Status: COMPLETED | OUTPATIENT
Start: 2022-01-01 | End: 2022-01-01

## 2022-01-01 RX ORDER — POLYETHYLENE GLYCOL 3350 17 G/17G
17 POWDER, FOR SOLUTION ORAL DAILY PRN
COMMUNITY
Start: 2022-01-01

## 2022-01-01 RX ORDER — OXYCODONE HYDROCHLORIDE 5 MG/1
2.5-5 TABLET ORAL EVERY 4 HOURS PRN
Qty: 24 TABLET | Refills: 0 | Status: SHIPPED | OUTPATIENT
Start: 2022-01-01 | End: 2022-01-01

## 2022-01-01 RX ORDER — VIT C/E/ZN/COPPR/LUTEIN/ZEAXAN 60 MG-6 MG
1 CAPSULE ORAL DAILY
COMMUNITY

## 2022-01-01 RX ORDER — PANTOPRAZOLE SODIUM 20 MG/1
40 TABLET, DELAYED RELEASE ORAL
Status: DISCONTINUED | OUTPATIENT
Start: 2022-01-01 | End: 2022-01-01 | Stop reason: HOSPADM

## 2022-01-01 RX ORDER — MORPHINE SULFATE 4 MG/ML
2 INJECTION, SOLUTION INTRAMUSCULAR; INTRAVENOUS ONCE
Status: COMPLETED | OUTPATIENT
Start: 2022-01-01 | End: 2022-01-01

## 2022-01-01 RX ORDER — HYDROMORPHONE HCL IN WATER/PF 6 MG/30 ML
0.2 PATIENT CONTROLLED ANALGESIA SYRINGE INTRAVENOUS EVERY 4 HOURS PRN
Status: DISCONTINUED | OUTPATIENT
Start: 2022-01-01 | End: 2022-01-01 | Stop reason: HOSPADM

## 2022-01-01 RX ORDER — PROPOFOL 10 MG/ML
INJECTION, EMULSION INTRAVENOUS PRN
Status: DISCONTINUED | OUTPATIENT
Start: 2022-01-01 | End: 2022-01-01

## 2022-01-01 RX ORDER — MAGNESIUM SULFATE HEPTAHYDRATE 40 MG/ML
2 INJECTION, SOLUTION INTRAVENOUS ONCE
Status: COMPLETED | OUTPATIENT
Start: 2022-01-01 | End: 2022-01-01

## 2022-01-01 RX ORDER — CEFAZOLIN SODIUM 1 G/3ML
1 INJECTION, POWDER, FOR SOLUTION INTRAMUSCULAR; INTRAVENOUS EVERY 8 HOURS
Status: COMPLETED | OUTPATIENT
Start: 2022-01-01 | End: 2022-01-01

## 2022-01-01 RX ORDER — ACETAMINOPHEN 325 MG/1
975 TABLET ORAL ONCE
Status: COMPLETED | OUTPATIENT
Start: 2022-01-01 | End: 2022-01-01

## 2022-01-01 RX ORDER — ACETAMINOPHEN 325 MG/1
650 TABLET ORAL 3 TIMES DAILY
Start: 2022-01-01 | End: 2022-01-01

## 2022-01-01 RX ORDER — BISACODYL 5 MG
10 TABLET, DELAYED RELEASE (ENTERIC COATED) ORAL ONCE
Status: COMPLETED | OUTPATIENT
Start: 2022-01-01 | End: 2022-01-01

## 2022-01-01 RX ORDER — VITAMIN B COMPLEX
2000 TABLET ORAL DAILY
Status: DISCONTINUED | OUTPATIENT
Start: 2022-01-01 | End: 2022-01-01 | Stop reason: HOSPADM

## 2022-01-01 RX ORDER — DOXYLAMINE SUCCINATE 25 MG/1
12.5 TABLET ORAL AT BEDTIME
COMMUNITY
Start: 2022-01-01

## 2022-01-01 RX ORDER — ACETAMINOPHEN 650 MG/1
650 SUPPOSITORY RECTAL EVERY 6 HOURS PRN
Status: DISCONTINUED | OUTPATIENT
Start: 2022-01-01 | End: 2022-01-01 | Stop reason: HOSPADM

## 2022-01-01 RX ORDER — FUROSEMIDE 20 MG
20 TABLET ORAL DAILY
Status: DISCONTINUED | OUTPATIENT
Start: 2022-01-01 | End: 2022-01-01

## 2022-01-01 RX ORDER — CLONIDINE HYDROCHLORIDE 0.1 MG/1
0.1 TABLET ORAL ONCE
Status: COMPLETED | OUTPATIENT
Start: 2022-01-01 | End: 2022-01-01

## 2022-01-01 RX ORDER — ONDANSETRON 2 MG/ML
4 INJECTION INTRAMUSCULAR; INTRAVENOUS EVERY 30 MIN PRN
Status: DISCONTINUED | OUTPATIENT
Start: 2022-01-01 | End: 2022-01-01 | Stop reason: HOSPADM

## 2022-01-01 RX ORDER — PANTOPRAZOLE SODIUM 40 MG/1
40 TABLET, DELAYED RELEASE ORAL
Status: DISCONTINUED | OUTPATIENT
Start: 2022-01-01 | End: 2022-01-01 | Stop reason: HOSPADM

## 2022-01-01 RX ORDER — LATANOPROST 50 UG/ML
1 SOLUTION/ DROPS OPHTHALMIC AT BEDTIME
Status: DISCONTINUED | OUTPATIENT
Start: 2022-01-01 | End: 2022-01-01 | Stop reason: HOSPADM

## 2022-01-01 RX ORDER — ONDANSETRON 2 MG/ML
4 INJECTION INTRAMUSCULAR; INTRAVENOUS EVERY 6 HOURS PRN
Status: DISCONTINUED | OUTPATIENT
Start: 2022-01-01 | End: 2022-01-01 | Stop reason: HOSPADM

## 2022-01-01 RX ORDER — ONDANSETRON 4 MG/1
4 TABLET, ORALLY DISINTEGRATING ORAL EVERY 6 HOURS PRN
Status: DISCONTINUED | OUTPATIENT
Start: 2022-01-01 | End: 2022-01-01 | Stop reason: HOSPADM

## 2022-01-01 RX ORDER — ACETAMINOPHEN 325 MG/1
975 TABLET ORAL 3 TIMES DAILY
DISCHARGE
Start: 2022-01-01 | End: 2022-01-01

## 2022-01-01 RX ORDER — QUETIAPINE FUMARATE 25 MG/1
12.5 TABLET, FILM COATED ORAL AT BEDTIME
Status: ON HOLD | COMMUNITY
End: 2022-01-01

## 2022-01-01 RX ORDER — EPHEDRINE SULFATE 50 MG/ML
INJECTION, SOLUTION INTRAMUSCULAR; INTRAVENOUS; SUBCUTANEOUS PRN
Status: DISCONTINUED | OUTPATIENT
Start: 2022-01-01 | End: 2022-01-01

## 2022-01-01 RX ORDER — VIT C/E/ZN/COPPR/LUTEIN/ZEAXAN 60 MG-6 MG
1 CAPSULE ORAL DAILY
Status: DISCONTINUED | OUTPATIENT
Start: 2022-01-01 | End: 2022-01-01 | Stop reason: HOSPADM

## 2022-01-01 RX ORDER — PROPOFOL 10 MG/ML
INJECTION, EMULSION INTRAVENOUS CONTINUOUS PRN
Status: DISCONTINUED | OUTPATIENT
Start: 2022-01-01 | End: 2022-01-01

## 2022-01-01 RX ORDER — ESCITALOPRAM OXALATE 10 MG/1
10 TABLET ORAL DAILY
Qty: 30 TABLET | Refills: 0 | Status: SHIPPED | OUTPATIENT
Start: 2022-01-01 | End: 2022-01-01

## 2022-01-01 RX ORDER — ONDANSETRON 4 MG/1
4 TABLET, ORALLY DISINTEGRATING ORAL EVERY 30 MIN PRN
Status: DISCONTINUED | OUTPATIENT
Start: 2022-01-01 | End: 2022-01-01 | Stop reason: HOSPADM

## 2022-01-01 RX ORDER — HYDRALAZINE HYDROCHLORIDE 20 MG/ML
10 INJECTION INTRAMUSCULAR; INTRAVENOUS EVERY 6 HOURS PRN
Status: DISCONTINUED | OUTPATIENT
Start: 2022-01-01 | End: 2022-01-01

## 2022-01-01 RX ORDER — BUPROPION HYDROCHLORIDE 150 MG/1
150 TABLET ORAL DAILY
Status: DISCONTINUED | OUTPATIENT
Start: 2022-01-01 | End: 2022-01-01 | Stop reason: HOSPADM

## 2022-01-01 RX ORDER — BUPIVACAINE HYDROCHLORIDE 7.5 MG/ML
INJECTION, SOLUTION INTRASPINAL
Status: COMPLETED | OUTPATIENT
Start: 2022-01-01 | End: 2022-01-01

## 2022-01-01 RX ORDER — PROCHLORPERAZINE MALEATE 5 MG
5 TABLET ORAL EVERY 6 HOURS PRN
Status: DISCONTINUED | OUTPATIENT
Start: 2022-01-01 | End: 2022-01-01

## 2022-01-01 RX ORDER — CEFAZOLIN SODIUM 2 G/100ML
2 INJECTION, SOLUTION INTRAVENOUS
Status: DISCONTINUED | OUTPATIENT
Start: 2022-01-01 | End: 2022-01-01 | Stop reason: HOSPADM

## 2022-01-01 RX ORDER — IPRATROPIUM BROMIDE AND ALBUTEROL SULFATE 2.5; .5 MG/3ML; MG/3ML
3 SOLUTION RESPIRATORY (INHALATION) ONCE
Status: COMPLETED | OUTPATIENT
Start: 2022-01-01 | End: 2022-01-01

## 2022-01-01 RX ORDER — FUROSEMIDE 10 MG/ML
20 INJECTION INTRAMUSCULAR; INTRAVENOUS ONCE
Status: COMPLETED | OUTPATIENT
Start: 2022-01-01 | End: 2022-01-01

## 2022-01-01 RX ORDER — CLOTRIMAZOLE 10 MG/1
1 LOZENGE ORAL
Qty: 50 LOZENGE | Refills: 0 | Status: SHIPPED | OUTPATIENT
Start: 2022-01-01

## 2022-01-01 RX ORDER — FUROSEMIDE 10 MG/ML
40 INJECTION INTRAMUSCULAR; INTRAVENOUS EVERY 12 HOURS
Status: DISCONTINUED | OUTPATIENT
Start: 2022-01-01 | End: 2022-01-01

## 2022-01-01 RX ORDER — DILTIAZEM HYDROCHLORIDE 90 MG/1
90 CAPSULE, EXTENDED RELEASE ORAL DAILY
Status: ON HOLD | COMMUNITY
End: 2022-01-01

## 2022-01-01 RX ORDER — SODIUM CHLORIDE 9 MG/ML
INJECTION, SOLUTION INTRAVENOUS ONCE
Status: DISCONTINUED | OUTPATIENT
Start: 2022-01-01 | End: 2022-01-01

## 2022-01-01 RX ORDER — SODIUM CHLORIDE, SODIUM LACTATE, POTASSIUM CHLORIDE, CALCIUM CHLORIDE 600; 310; 30; 20 MG/100ML; MG/100ML; MG/100ML; MG/100ML
INJECTION, SOLUTION INTRAVENOUS CONTINUOUS
Status: DISCONTINUED | OUTPATIENT
Start: 2022-01-01 | End: 2022-01-01

## 2022-01-01 RX ORDER — LACTULOSE 10 G/15ML
20 SOLUTION ORAL 2 TIMES DAILY
Status: COMPLETED | OUTPATIENT
Start: 2022-01-01 | End: 2022-01-01

## 2022-01-01 RX ORDER — LIDOCAINE 4 G/G
1 PATCH TOPICAL
Status: DISCONTINUED | OUTPATIENT
Start: 2022-01-01 | End: 2022-01-01 | Stop reason: HOSPADM

## 2022-01-01 RX ORDER — SODIUM BICARBONATE 650 MG/1
650 TABLET ORAL 2 TIMES DAILY
Status: DISCONTINUED | OUTPATIENT
Start: 2022-01-01 | End: 2022-01-01 | Stop reason: HOSPADM

## 2022-01-01 RX ORDER — GEMFIBROZIL 600 MG/1
600 TABLET, FILM COATED ORAL
Status: DISCONTINUED | OUTPATIENT
Start: 2022-01-01 | End: 2022-01-01 | Stop reason: HOSPADM

## 2022-01-01 RX ORDER — ACETAMINOPHEN 325 MG/1
975 TABLET ORAL ONCE
Status: DISCONTINUED | OUTPATIENT
Start: 2022-01-01 | End: 2022-01-01 | Stop reason: HOSPADM

## 2022-01-01 RX ORDER — QUETIAPINE FUMARATE 25 MG/1
25 TABLET, FILM COATED ORAL AT BEDTIME
Qty: 30 TABLET | Refills: 0 | Status: SHIPPED | OUTPATIENT
Start: 2022-01-01 | End: 2022-01-01

## 2022-01-01 RX ORDER — DILTIAZEM HYDROCHLORIDE 120 MG/1
120 CAPSULE, COATED, EXTENDED RELEASE ORAL DAILY
Qty: 30 CAPSULE | Refills: 0 | Status: SHIPPED | OUTPATIENT
Start: 2022-01-01 | End: 2022-01-01

## 2022-01-01 RX ADMIN — SENNOSIDES AND DOCUSATE SODIUM 1 TABLET: 50; 8.6 TABLET ORAL at 19:10

## 2022-01-01 RX ADMIN — ACETAMINOPHEN 975 MG: 325 TABLET, FILM COATED ORAL at 19:11

## 2022-01-01 RX ADMIN — QUETIAPINE 12.5 MG: 25 TABLET, FILM COATED ORAL at 21:04

## 2022-01-01 RX ADMIN — ESCITALOPRAM OXALATE 10 MG: 10 TABLET ORAL at 15:51

## 2022-01-01 RX ADMIN — TRAMADOL HYDROCHLORIDE 25 MG: 50 TABLET ORAL at 22:41

## 2022-01-01 RX ADMIN — OXYCODONE HYDROCHLORIDE 2.5 MG: 5 TABLET ORAL at 17:47

## 2022-01-01 RX ADMIN — DORZOLAMIDE HYDROCHLORIDE AND TIMOLOL MALEATE 1 DROP: 20; 5 SOLUTION/ DROPS OPHTHALMIC at 08:26

## 2022-01-01 RX ADMIN — LATANOPROST 1 DROP: 50 SOLUTION OPHTHALMIC at 21:13

## 2022-01-01 RX ADMIN — HEPARIN SODIUM 5000 UNITS: 10000 INJECTION, SOLUTION INTRAVENOUS; SUBCUTANEOUS at 08:44

## 2022-01-01 RX ADMIN — Medication 5 MG: at 15:19

## 2022-01-01 RX ADMIN — SODIUM BICARBONATE 650 MG: 650 TABLET ORAL at 19:32

## 2022-01-01 RX ADMIN — DORZOLAMIDE HYDROCHLORIDE AND TIMOLOL MALEATE 1 DROP: 20; 5 SOLUTION/ DROPS OPHTHALMIC at 08:28

## 2022-01-01 RX ADMIN — ALPRAZOLAM 0.25 MG: 0.25 TABLET ORAL at 10:45

## 2022-01-01 RX ADMIN — DILTIAZEM HYDROCHLORIDE 120 MG: 120 CAPSULE, COATED, EXTENDED RELEASE ORAL at 08:44

## 2022-01-01 RX ADMIN — MORPHINE SULFATE 2 MG: 4 INJECTION INTRAVENOUS at 11:24

## 2022-01-01 RX ADMIN — IPRATROPIUM BROMIDE AND ALBUTEROL SULFATE 3 ML: .5; 3 SOLUTION RESPIRATORY (INHALATION) at 00:03

## 2022-01-01 RX ADMIN — DOCUSATE SODIUM 50 MG AND SENNOSIDES 8.6 MG 1 TABLET: 8.6; 5 TABLET, FILM COATED ORAL at 08:15

## 2022-01-01 RX ADMIN — ACETAMINOPHEN 975 MG: 325 TABLET ORAL at 20:16

## 2022-01-01 RX ADMIN — Medication 2000 UNITS: at 08:25

## 2022-01-01 RX ADMIN — PHENYLEPHRINE HYDROCHLORIDE 0.5 MCG/KG/MIN: 10 INJECTION INTRAVENOUS at 14:03

## 2022-01-01 RX ADMIN — ACETAMINOPHEN 975 MG: 325 TABLET ORAL at 14:03

## 2022-01-01 RX ADMIN — ACETAMINOPHEN 975 MG: 325 TABLET ORAL at 08:40

## 2022-01-01 RX ADMIN — DORZOLAMIDE HYDROCHLORIDE AND TIMOLOL MALEATE 1 DROP: 20; 5 SOLUTION/ DROPS OPHTHALMIC at 19:43

## 2022-01-01 RX ADMIN — ACETAMINOPHEN 975 MG: 325 TABLET, FILM COATED ORAL at 20:45

## 2022-01-01 RX ADMIN — POLYETHYLENE GLYCOL 3350 34 G: 17 POWDER, FOR SOLUTION ORAL at 11:30

## 2022-01-01 RX ADMIN — SODIUM BICARBONATE 650 MG: 650 TABLET ORAL at 08:06

## 2022-01-01 RX ADMIN — OXYCODONE HYDROCHLORIDE 2.5 MG: 5 TABLET ORAL at 06:41

## 2022-01-01 RX ADMIN — ACETAMINOPHEN 975 MG: 325 TABLET, FILM COATED ORAL at 08:05

## 2022-01-01 RX ADMIN — CLOTRIMAZOLE 1 LOZENGE: 10 LOZENGE ORAL at 08:09

## 2022-01-01 RX ADMIN — GEMFIBROZIL 600 MG: 600 TABLET ORAL at 15:39

## 2022-01-01 RX ADMIN — ACETAMINOPHEN 975 MG: 325 TABLET ORAL at 08:25

## 2022-01-01 RX ADMIN — DORZOLAMIDE HYDROCHLORIDE AND TIMOLOL MALEATE 1 DROP: 20; 5 SOLUTION/ DROPS OPHTHALMIC at 09:04

## 2022-01-01 RX ADMIN — SODIUM BICARBONATE 650 MG: 648 TABLET ORAL at 08:15

## 2022-01-01 RX ADMIN — BUPROPION HYDROCHLORIDE 150 MG: 150 TABLET, EXTENDED RELEASE ORAL at 08:17

## 2022-01-01 RX ADMIN — DORZOLAMIDE HYDROCHLORIDE AND TIMOLOL MALEATE 1 DROP: 20; 5 SOLUTION/ DROPS OPHTHALMIC at 20:33

## 2022-01-01 RX ADMIN — QUETIAPINE 12.5 MG: 25 TABLET, FILM COATED ORAL at 17:01

## 2022-01-01 RX ADMIN — DOCUSATE SODIUM 50 MG AND SENNOSIDES 8.6 MG 1 TABLET: 8.6; 5 TABLET, FILM COATED ORAL at 20:33

## 2022-01-01 RX ADMIN — DILTIAZEM HYDROCHLORIDE 60 MG: 60 CAPSULE, EXTENDED RELEASE ORAL at 14:25

## 2022-01-01 RX ADMIN — DOCUSATE SODIUM 50 MG AND SENNOSIDES 8.6 MG 1 TABLET: 8.6; 5 TABLET, FILM COATED ORAL at 20:16

## 2022-01-01 RX ADMIN — Medication 2000 UNITS: at 08:40

## 2022-01-01 RX ADMIN — LACTULOSE 20 G: 10 SOLUTION ORAL at 13:28

## 2022-01-01 RX ADMIN — ACETAMINOPHEN 975 MG: 325 TABLET, FILM COATED ORAL at 13:48

## 2022-01-01 RX ADMIN — ENOXAPARIN SODIUM 30 MG: 30 INJECTION SUBCUTANEOUS at 13:08

## 2022-01-01 RX ADMIN — DILTIAZEM HYDROCHLORIDE 120 MG: 120 CAPSULE, COATED, EXTENDED RELEASE ORAL at 08:17

## 2022-01-01 RX ADMIN — POLYETHYLENE GLYCOL 3350 17 G: 17 POWDER, FOR SOLUTION ORAL at 08:15

## 2022-01-01 RX ADMIN — DORZOLAMIDE HYDROCHLORIDE AND TIMOLOL MALEATE 1 DROP: 20; 5 SOLUTION/ DROPS OPHTHALMIC at 20:16

## 2022-01-01 RX ADMIN — DILTIAZEM HYDROCHLORIDE 120 MG: 60 CAPSULE, EXTENDED RELEASE ORAL at 08:06

## 2022-01-01 RX ADMIN — DOCUSATE SODIUM 50 MG AND SENNOSIDES 8.6 MG 1 TABLET: 8.6; 5 TABLET, FILM COATED ORAL at 20:32

## 2022-01-01 RX ADMIN — PANTOPRAZOLE SODIUM 40 MG: 40 TABLET, DELAYED RELEASE ORAL at 06:34

## 2022-01-01 RX ADMIN — ACETAMINOPHEN 975 MG: 325 TABLET ORAL at 18:15

## 2022-01-01 RX ADMIN — HEPARIN SODIUM 5000 UNITS: 10000 INJECTION, SOLUTION INTRAVENOUS; SUBCUTANEOUS at 08:06

## 2022-01-01 RX ADMIN — PROPOFOL 30 MG: 10 INJECTION, EMULSION INTRAVENOUS at 15:38

## 2022-01-01 RX ADMIN — MAGNESIUM SULFATE HEPTAHYDRATE 2 G: 40 INJECTION, SOLUTION INTRAVENOUS at 15:53

## 2022-01-01 RX ADMIN — Medication 1 CAPSULE: at 08:06

## 2022-01-01 RX ADMIN — LIDOCAINE 1 PATCH: 246 PATCH TOPICAL at 08:41

## 2022-01-01 RX ADMIN — SENNOSIDES AND DOCUSATE SODIUM 1 TABLET: 50; 8.6 TABLET ORAL at 08:57

## 2022-01-01 RX ADMIN — PANTOPRAZOLE SODIUM 40 MG: 40 TABLET, DELAYED RELEASE ORAL at 08:57

## 2022-01-01 RX ADMIN — DOCUSATE SODIUM 50 MG AND SENNOSIDES 8.6 MG 1 TABLET: 8.6; 5 TABLET, FILM COATED ORAL at 08:16

## 2022-01-01 RX ADMIN — GEMFIBROZIL 600 MG: 600 TABLET ORAL at 06:37

## 2022-01-01 RX ADMIN — DORZOLAMIDE HYDROCHLORIDE AND TIMOLOL MALEATE 1 DROP: 20; 5 SOLUTION/ DROPS OPHTHALMIC at 08:20

## 2022-01-01 RX ADMIN — POLYETHYLENE GLYCOL 3350 17 G: 17 POWDER, FOR SOLUTION ORAL at 08:39

## 2022-01-01 RX ADMIN — QUETIAPINE 12.5 MG: 25 TABLET, FILM COATED ORAL at 04:56

## 2022-01-01 RX ADMIN — SODIUM BICARBONATE 650 MG: 648 TABLET ORAL at 08:25

## 2022-01-01 RX ADMIN — SENNOSIDES AND DOCUSATE SODIUM 1 TABLET: 50; 8.6 TABLET ORAL at 08:44

## 2022-01-01 RX ADMIN — DILTIAZEM HYDROCHLORIDE 120 MG: 120 CAPSULE, COATED, EXTENDED RELEASE ORAL at 08:28

## 2022-01-01 RX ADMIN — SODIUM BICARBONATE 650 MG: 648 TABLET ORAL at 08:17

## 2022-01-01 RX ADMIN — SODIUM BICARBONATE 650 MG: 648 TABLET ORAL at 08:40

## 2022-01-01 RX ADMIN — BUPROPION HYDROCHLORIDE 150 MG: 150 TABLET, EXTENDED RELEASE ORAL at 08:40

## 2022-01-01 RX ADMIN — QUETIAPINE 12.5 MG: 25 TABLET, FILM COATED ORAL at 20:17

## 2022-01-01 RX ADMIN — ACETAMINOPHEN 975 MG: 325 TABLET ORAL at 13:15

## 2022-01-01 RX ADMIN — ACETAMINOPHEN 975 MG: 325 TABLET ORAL at 08:27

## 2022-01-01 RX ADMIN — ENOXAPARIN SODIUM 30 MG: 30 INJECTION SUBCUTANEOUS at 12:06

## 2022-01-01 RX ADMIN — DILTIAZEM HYDROCHLORIDE 90 MG: 90 CAPSULE, EXTENDED RELEASE ORAL at 09:02

## 2022-01-01 RX ADMIN — QUETIAPINE 12.5 MG: 25 TABLET, FILM COATED ORAL at 11:35

## 2022-01-01 RX ADMIN — HEPARIN SODIUM 5000 UNITS: 10000 INJECTION, SOLUTION INTRAVENOUS; SUBCUTANEOUS at 08:58

## 2022-01-01 RX ADMIN — SODIUM BICARBONATE 650 MG: 648 TABLET ORAL at 20:36

## 2022-01-01 RX ADMIN — ONDANSETRON 4 MG: 2 INJECTION INTRAMUSCULAR; INTRAVENOUS at 14:50

## 2022-01-01 RX ADMIN — DORZOLAMIDE HYDROCHLORIDE AND TIMOLOL MALEATE 1 DROP: 20; 5 SOLUTION/ DROPS OPHTHALMIC at 08:42

## 2022-01-01 RX ADMIN — GEMFIBROZIL 600 MG: 600 TABLET ORAL at 21:25

## 2022-01-01 RX ADMIN — QUETIAPINE 12.5 MG: 25 TABLET, FILM COATED ORAL at 09:32

## 2022-01-01 RX ADMIN — TRAMADOL HYDROCHLORIDE 25 MG: 50 TABLET ORAL at 00:05

## 2022-01-01 RX ADMIN — LIDOCAINE 1 PATCH: 246 PATCH TOPICAL at 08:16

## 2022-01-01 RX ADMIN — ACETAMINOPHEN 975 MG: 325 TABLET, FILM COATED ORAL at 19:43

## 2022-01-01 RX ADMIN — ALPRAZOLAM 0.25 MG: 0.25 TABLET ORAL at 15:36

## 2022-01-01 RX ADMIN — FUROSEMIDE 20 MG: 20 TABLET ORAL at 08:06

## 2022-01-01 RX ADMIN — Medication 1 CAPSULE: at 09:02

## 2022-01-01 RX ADMIN — LACTULOSE 20 G: 10 SOLUTION ORAL at 20:17

## 2022-01-01 RX ADMIN — QUETIAPINE FUMARATE 25 MG: 25 TABLET ORAL at 20:53

## 2022-01-01 RX ADMIN — PROPOFOL 125 MCG/KG/MIN: 10 INJECTION, EMULSION INTRAVENOUS at 13:25

## 2022-01-01 RX ADMIN — ENOXAPARIN SODIUM 30 MG: 30 INJECTION SUBCUTANEOUS at 11:35

## 2022-01-01 RX ADMIN — CEFAZOLIN 1 G: 1 INJECTION, POWDER, FOR SOLUTION INTRAMUSCULAR; INTRAVENOUS at 18:15

## 2022-01-01 RX ADMIN — PANTOPRAZOLE SODIUM 40 MG: 40 TABLET, DELAYED RELEASE ORAL at 06:42

## 2022-01-01 RX ADMIN — Medication 1 MG: at 22:41

## 2022-01-01 RX ADMIN — PANTOPRAZOLE SODIUM 40 MG: 40 TABLET, DELAYED RELEASE ORAL at 08:43

## 2022-01-01 RX ADMIN — POLYETHYLENE GLYCOL 3350 17 G: 17 POWDER, FOR SOLUTION ORAL at 08:24

## 2022-01-01 RX ADMIN — ACETAMINOPHEN 975 MG: 325 TABLET, FILM COATED ORAL at 16:20

## 2022-01-01 RX ADMIN — DILTIAZEM HYDROCHLORIDE 90 MG: 90 CAPSULE, EXTENDED RELEASE ORAL at 08:40

## 2022-01-01 RX ADMIN — ALPRAZOLAM 0.25 MG: 0.25 TABLET ORAL at 00:05

## 2022-01-01 RX ADMIN — SODIUM CHLORIDE 500 ML: 9 INJECTION, SOLUTION INTRAVENOUS at 13:48

## 2022-01-01 RX ADMIN — SODIUM BICARBONATE 650 MG: 648 TABLET ORAL at 21:25

## 2022-01-01 RX ADMIN — LIDOCAINE 1 PATCH: 246 PATCH TOPICAL at 11:34

## 2022-01-01 RX ADMIN — LATANOPROST 1 DROP: 50 SOLUTION/ DROPS OPHTHALMIC at 01:03

## 2022-01-01 RX ADMIN — ACETAMINOPHEN 975 MG: 325 TABLET, FILM COATED ORAL at 19:24

## 2022-01-01 RX ADMIN — ACETAMINOPHEN 975 MG: 325 TABLET ORAL at 18:45

## 2022-01-01 RX ADMIN — DORZOLAMIDE HYDROCHLORIDE AND TIMOLOL MALEATE 1 DROP: 20; 5 SOLUTION/ DROPS OPHTHALMIC at 20:17

## 2022-01-01 RX ADMIN — LATANOPROST 1 DROP: 50 SOLUTION/ DROPS OPHTHALMIC at 21:27

## 2022-01-01 RX ADMIN — DILTIAZEM HYDROCHLORIDE 120 MG: 120 CAPSULE, COATED, EXTENDED RELEASE ORAL at 08:15

## 2022-01-01 RX ADMIN — BUPROPION HYDROCHLORIDE 150 MG: 150 TABLET, EXTENDED RELEASE ORAL at 08:15

## 2022-01-01 RX ADMIN — FUROSEMIDE 20 MG: 10 INJECTION, SOLUTION INTRAMUSCULAR; INTRAVENOUS at 01:26

## 2022-01-01 RX ADMIN — DILTIAZEM HYDROCHLORIDE 120 MG: 120 CAPSULE, COATED, EXTENDED RELEASE ORAL at 08:40

## 2022-01-01 RX ADMIN — DILTIAZEM HYDROCHLORIDE 120 MG: 120 CAPSULE, COATED, EXTENDED RELEASE ORAL at 08:10

## 2022-01-01 RX ADMIN — SODIUM CHLORIDE, POTASSIUM CHLORIDE, SODIUM LACTATE AND CALCIUM CHLORIDE 50 ML/HR: 600; 310; 30; 20 INJECTION, SOLUTION INTRAVENOUS at 14:46

## 2022-01-01 RX ADMIN — BUPIVACAINE HYDROCHLORIDE IN DEXTROSE 1.6 ML: 7.5 INJECTION, SOLUTION SUBARACHNOID at 13:16

## 2022-01-01 RX ADMIN — BUPROPION HYDROCHLORIDE 150 MG: 150 TABLET, FILM COATED, EXTENDED RELEASE ORAL at 09:02

## 2022-01-01 RX ADMIN — SODIUM BICARBONATE 650 MG: 648 TABLET ORAL at 19:12

## 2022-01-01 RX ADMIN — PANTOPRAZOLE SODIUM 40 MG: 40 TABLET, DELAYED RELEASE ORAL at 06:44

## 2022-01-01 RX ADMIN — HEPARIN SODIUM 5000 UNITS: 10000 INJECTION, SOLUTION INTRAVENOUS; SUBCUTANEOUS at 08:19

## 2022-01-01 RX ADMIN — ACETAMINOPHEN 975 MG: 325 TABLET, FILM COATED ORAL at 14:52

## 2022-01-01 RX ADMIN — METHYLPREDNISOLONE SODIUM SUCCINATE 125 MG: 125 INJECTION, POWDER, FOR SOLUTION INTRAMUSCULAR; INTRAVENOUS at 00:10

## 2022-01-01 RX ADMIN — QUETIAPINE 25 MG: 25 TABLET, FILM COATED ORAL at 00:04

## 2022-01-01 RX ADMIN — Medication 1 CAPSULE: at 08:18

## 2022-01-01 RX ADMIN — SENNOSIDES AND DOCUSATE SODIUM 1 TABLET: 50; 8.6 TABLET ORAL at 19:43

## 2022-01-01 RX ADMIN — Medication 10 MG: at 13:50

## 2022-01-01 RX ADMIN — LATANOPROST 1 DROP: 50 SOLUTION OPHTHALMIC at 04:56

## 2022-01-01 RX ADMIN — BUPROPION HYDROCHLORIDE 150 MG: 150 TABLET, EXTENDED RELEASE ORAL at 08:25

## 2022-01-01 RX ADMIN — GEMFIBROZIL 600 MG: 600 TABLET ORAL at 06:36

## 2022-01-01 RX ADMIN — DORZOLAMIDE HYDROCHLORIDE AND TIMOLOL MALEATE 1 DROP: 20; 5 SOLUTION/ DROPS OPHTHALMIC at 21:13

## 2022-01-01 RX ADMIN — SENNOSIDES AND DOCUSATE SODIUM 1 TABLET: 50; 8.6 TABLET ORAL at 19:24

## 2022-01-01 RX ADMIN — ACETAMINOPHEN 975 MG: 325 TABLET ORAL at 08:15

## 2022-01-01 RX ADMIN — HEPARIN SODIUM 5000 UNITS: 10000 INJECTION, SOLUTION INTRAVENOUS; SUBCUTANEOUS at 19:10

## 2022-01-01 RX ADMIN — Medication 10 MG: at 14:00

## 2022-01-01 RX ADMIN — MIRTAZAPINE 7.5 MG: 7.5 TABLET, FILM COATED ORAL at 20:53

## 2022-01-01 RX ADMIN — DILTIAZEM HYDROCHLORIDE 120 MG: 120 CAPSULE, COATED, EXTENDED RELEASE ORAL at 08:25

## 2022-01-01 RX ADMIN — QUETIAPINE FUMARATE 25 MG: 25 TABLET ORAL at 21:19

## 2022-01-01 RX ADMIN — HEPARIN SODIUM 5000 UNITS: 10000 INJECTION, SOLUTION INTRAVENOUS; SUBCUTANEOUS at 19:43

## 2022-01-01 RX ADMIN — MIRTAZAPINE 7.5 MG: 7.5 TABLET, FILM COATED ORAL at 20:33

## 2022-01-01 RX ADMIN — OXYCODONE HYDROCHLORIDE 2.5 MG: 5 TABLET ORAL at 03:03

## 2022-01-01 RX ADMIN — ACETAMINOPHEN 975 MG: 325 TABLET, FILM COATED ORAL at 14:25

## 2022-01-01 RX ADMIN — ACETAMINOPHEN 975 MG: 325 TABLET ORAL at 19:12

## 2022-01-01 RX ADMIN — ACETAMINOPHEN 975 MG: 325 TABLET, FILM COATED ORAL at 08:18

## 2022-01-01 RX ADMIN — FUROSEMIDE 40 MG: 10 INJECTION, SOLUTION INTRAMUSCULAR; INTRAVENOUS at 09:11

## 2022-01-01 RX ADMIN — CEFAZOLIN 1 G: 1 INJECTION, POWDER, FOR SOLUTION INTRAMUSCULAR; INTRAVENOUS at 03:04

## 2022-01-01 RX ADMIN — MIRTAZAPINE 7.5 MG: 7.5 TABLET, FILM COATED ORAL at 21:20

## 2022-01-01 RX ADMIN — SODIUM BICARBONATE 650 MG: 648 TABLET ORAL at 20:16

## 2022-01-01 RX ADMIN — SODIUM CHLORIDE, POTASSIUM CHLORIDE, SODIUM LACTATE AND CALCIUM CHLORIDE: 600; 310; 30; 20 INJECTION, SOLUTION INTRAVENOUS at 14:26

## 2022-01-01 RX ADMIN — Medication 1 CAPSULE: at 08:40

## 2022-01-01 RX ADMIN — BUPROPION HYDROCHLORIDE 150 MG: 150 TABLET, FILM COATED, EXTENDED RELEASE ORAL at 08:40

## 2022-01-01 RX ADMIN — Medication 2000 UNITS: at 08:16

## 2022-01-01 RX ADMIN — LATANOPROST 1 DROP: 50 SOLUTION/ DROPS OPHTHALMIC at 21:00

## 2022-01-01 RX ADMIN — OXYCODONE HYDROCHLORIDE 2.5 MG: 5 TABLET ORAL at 19:01

## 2022-01-01 RX ADMIN — MIRTAZAPINE 7.5 MG: 7.5 TABLET, FILM COATED ORAL at 00:04

## 2022-01-01 RX ADMIN — ACETAMINOPHEN 975 MG: 325 TABLET, FILM COATED ORAL at 08:40

## 2022-01-01 RX ADMIN — MIRTAZAPINE 7.5 MG: 7.5 TABLET, FILM COATED ORAL at 04:56

## 2022-01-01 RX ADMIN — QUETIAPINE 12.5 MG: 25 TABLET, FILM COATED ORAL at 21:13

## 2022-01-01 RX ADMIN — PANTOPRAZOLE SODIUM 40 MG: 40 TABLET, DELAYED RELEASE ORAL at 08:16

## 2022-01-01 RX ADMIN — SODIUM CHLORIDE 500 ML: 9 INJECTION, SOLUTION INTRAVENOUS at 16:10

## 2022-01-01 RX ADMIN — FUROSEMIDE 40 MG: 10 INJECTION, SOLUTION INTRAMUSCULAR; INTRAVENOUS at 08:39

## 2022-01-01 RX ADMIN — FUROSEMIDE 40 MG: 10 INJECTION, SOLUTION INTRAMUSCULAR; INTRAVENOUS at 21:04

## 2022-01-01 RX ADMIN — SODIUM CHLORIDE, POTASSIUM CHLORIDE, SODIUM LACTATE AND CALCIUM CHLORIDE: 600; 310; 30; 20 INJECTION, SOLUTION INTRAVENOUS at 11:34

## 2022-01-01 RX ADMIN — Medication 20 MG: at 15:11

## 2022-01-01 RX ADMIN — ESCITALOPRAM OXALATE 10 MG: 10 TABLET ORAL at 08:44

## 2022-01-01 RX ADMIN — PANTOPRAZOLE SODIUM 40 MG: 40 TABLET, DELAYED RELEASE ORAL at 06:41

## 2022-01-01 RX ADMIN — Medication 5 MG: at 20:17

## 2022-01-01 RX ADMIN — ACETAMINOPHEN 975 MG: 325 TABLET ORAL at 13:08

## 2022-01-01 RX ADMIN — PROCHLORPERAZINE EDISYLATE 5 MG: 5 INJECTION INTRAMUSCULAR; INTRAVENOUS at 22:41

## 2022-01-01 RX ADMIN — ALPRAZOLAM 0.25 MG: 0.25 TABLET ORAL at 17:04

## 2022-01-01 RX ADMIN — GEMFIBROZIL 600 MG: 600 TABLET ORAL at 08:17

## 2022-01-01 RX ADMIN — SENNOSIDES AND DOCUSATE SODIUM 1 TABLET: 50; 8.6 TABLET ORAL at 21:04

## 2022-01-01 RX ADMIN — Medication 30 MG: at 13:12

## 2022-01-01 RX ADMIN — Medication 1 MG: at 00:03

## 2022-01-01 RX ADMIN — LATANOPROST 1 DROP: 50 SOLUTION OPHTHALMIC at 21:20

## 2022-01-01 RX ADMIN — DILTIAZEM HYDROCHLORIDE 90 MG: 90 CAPSULE, EXTENDED RELEASE ORAL at 08:18

## 2022-01-01 RX ADMIN — OXYCODONE HYDROCHLORIDE 2.5 MG: 5 TABLET ORAL at 22:42

## 2022-01-01 RX ADMIN — Medication 5 MG: at 20:33

## 2022-01-01 RX ADMIN — LIDOCAINE HYDROCHLORIDE 20 MG: 10 INJECTION, SOLUTION INFILTRATION; PERINEURAL at 13:25

## 2022-01-01 RX ADMIN — PANTOPRAZOLE SODIUM 40 MG: 40 TABLET, DELAYED RELEASE ORAL at 06:37

## 2022-01-01 RX ADMIN — NYSTATIN 500000 UNITS: 100000 SUSPENSION ORAL at 18:24

## 2022-01-01 RX ADMIN — DOCUSATE SODIUM 50 MG AND SENNOSIDES 8.6 MG 1 TABLET: 8.6; 5 TABLET, FILM COATED ORAL at 08:39

## 2022-01-01 RX ADMIN — BISACODYL 10 MG: 5 TABLET, COATED ORAL at 11:48

## 2022-01-01 RX ADMIN — ENOXAPARIN SODIUM 30 MG: 30 INJECTION SUBCUTANEOUS at 12:17

## 2022-01-01 RX ADMIN — CLONIDINE HYDROCHLORIDE 0.1 MG: 0.1 TABLET ORAL at 11:30

## 2022-01-01 RX ADMIN — OXYCODONE HYDROCHLORIDE 2.5 MG: 5 TABLET ORAL at 20:32

## 2022-01-01 RX ADMIN — ACETAMINOPHEN 975 MG: 325 TABLET ORAL at 19:40

## 2022-01-01 RX ADMIN — ACETAMINOPHEN 975 MG: 325 TABLET, FILM COATED ORAL at 08:44

## 2022-01-01 RX ADMIN — GEMFIBROZIL 600 MG: 600 TABLET ORAL at 08:25

## 2022-01-01 RX ADMIN — DOCUSATE SODIUM 50 MG AND SENNOSIDES 8.6 MG 1 TABLET: 8.6; 5 TABLET, FILM COATED ORAL at 20:17

## 2022-01-01 RX ADMIN — DOCUSATE SODIUM 50 MG AND SENNOSIDES 8.6 MG 1 TABLET: 8.6; 5 TABLET, FILM COATED ORAL at 08:25

## 2022-01-01 RX ADMIN — LATANOPROST 1 DROP: 50 SOLUTION OPHTHALMIC at 20:53

## 2022-01-01 RX ADMIN — PANTOPRAZOLE SODIUM 40 MG: 20 TABLET, DELAYED RELEASE ORAL at 06:38

## 2022-01-01 RX ADMIN — HEPARIN SODIUM 5000 UNITS: 10000 INJECTION, SOLUTION INTRAVENOUS; SUBCUTANEOUS at 08:39

## 2022-01-01 RX ADMIN — HEPARIN SODIUM 5000 UNITS: 10000 INJECTION, SOLUTION INTRAVENOUS; SUBCUTANEOUS at 21:04

## 2022-01-01 RX ADMIN — ACETAMINOPHEN 975 MG: 325 TABLET, FILM COATED ORAL at 08:57

## 2022-01-01 RX ADMIN — Medication 1 MG: at 03:03

## 2022-01-01 RX ADMIN — SENNOSIDES AND DOCUSATE SODIUM 1 TABLET: 50; 8.6 TABLET ORAL at 08:18

## 2022-01-01 RX ADMIN — QUETIAPINE 12.5 MG: 25 TABLET, FILM COATED ORAL at 10:03

## 2022-01-01 RX ADMIN — BUPROPION HYDROCHLORIDE 150 MG: 150 TABLET, FILM COATED, EXTENDED RELEASE ORAL at 08:06

## 2022-01-01 RX ADMIN — SENNOSIDES AND DOCUSATE SODIUM 1 TABLET: 50; 8.6 TABLET ORAL at 08:39

## 2022-01-01 RX ADMIN — LATANOPROST 1 DROP: 50 SOLUTION/ DROPS OPHTHALMIC at 21:46

## 2022-01-01 RX ADMIN — LIDOCAINE 1 PATCH: 246 PATCH TOPICAL at 08:18

## 2022-01-01 RX ADMIN — DORZOLAMIDE HYDROCHLORIDE AND TIMOLOL MALEATE 1 DROP: 20; 5 SOLUTION/ DROPS OPHTHALMIC at 08:21

## 2022-01-01 RX ADMIN — OXYCODONE HYDROCHLORIDE 2.5 MG: 5 TABLET ORAL at 06:56

## 2022-01-01 RX ADMIN — PANTOPRAZOLE SODIUM 40 MG: 40 TABLET, DELAYED RELEASE ORAL at 06:56

## 2022-01-01 RX ADMIN — Medication 2000 UNITS: at 08:24

## 2022-01-01 RX ADMIN — DORZOLAMIDE HYDROCHLORIDE AND TIMOLOL MALEATE 1 DROP: 20; 5 SOLUTION/ DROPS OPHTHALMIC at 08:06

## 2022-01-01 RX ADMIN — Medication 1 CAPSULE: at 08:44

## 2022-01-01 RX ADMIN — DORZOLAMIDE HYDROCHLORIDE AND TIMOLOL MALEATE 1 DROP: 20; 5 SOLUTION/ DROPS OPHTHALMIC at 08:41

## 2022-01-01 RX ADMIN — ACETAMINOPHEN 975 MG: 325 TABLET, FILM COATED ORAL at 11:23

## 2022-01-01 RX ADMIN — SODIUM CHLORIDE 500 ML: 9 INJECTION, SOLUTION INTRAVENOUS at 21:20

## 2022-01-01 RX ADMIN — SODIUM BICARBONATE 650 MG: 648 TABLET ORAL at 19:40

## 2022-01-01 RX ADMIN — DORZOLAMIDE HYDROCHLORIDE AND TIMOLOL MALEATE 1 DROP: 20; 5 SOLUTION/ DROPS OPHTHALMIC at 08:45

## 2022-01-01 RX ADMIN — DORZOLAMIDE HYDROCHLORIDE AND TIMOLOL MALEATE 1 DROP: 20; 5 SOLUTION/ DROPS OPHTHALMIC at 19:25

## 2022-01-01 RX ADMIN — PROPOFOL 30 MG: 10 INJECTION, EMULSION INTRAVENOUS at 13:25

## 2022-01-01 RX ADMIN — MIRTAZAPINE 7.5 MG: 7.5 TABLET, FILM COATED ORAL at 20:17

## 2022-01-01 RX ADMIN — GEMFIBROZIL 600 MG: 600 TABLET ORAL at 16:41

## 2022-01-01 RX ADMIN — HEPARIN SODIUM 5000 UNITS: 10000 INJECTION, SOLUTION INTRAVENOUS; SUBCUTANEOUS at 19:24

## 2022-01-01 RX ADMIN — DORZOLAMIDE HYDROCHLORIDE AND TIMOLOL MALEATE 1 DROP: 20; 5 SOLUTION/ DROPS OPHTHALMIC at 19:11

## 2022-01-01 RX ADMIN — TRAMADOL HYDROCHLORIDE 50 MG: 50 TABLET ORAL at 12:55

## 2022-01-01 RX ADMIN — SENNOSIDES AND DOCUSATE SODIUM 1 TABLET: 50; 8.6 TABLET ORAL at 08:06

## 2022-01-01 RX ADMIN — GEMFIBROZIL 600 MG: 600 TABLET ORAL at 16:11

## 2022-01-01 RX ADMIN — ESCITALOPRAM OXALATE 10 MG: 10 TABLET ORAL at 08:18

## 2022-01-01 RX ADMIN — MIRTAZAPINE 7.5 MG: 7.5 TABLET, FILM COATED ORAL at 21:13

## 2022-01-01 ASSESSMENT — ACTIVITIES OF DAILY LIVING (ADL)
DEPENDENT_IADLS:: CLEANING;COOKING;LAUNDRY;SHOPPING;MEAL PREPARATION;MEDICATION MANAGEMENT;TRANSPORTATION
ADLS_ACUITY_SCORE: 13
ADLS_ACUITY_SCORE: 27
ADLS_ACUITY_SCORE: 23
ADLS_ACUITY_SCORE: 9
ADLS_ACUITY_SCORE: 12
ADLS_ACUITY_SCORE: 13
ADLS_ACUITY_SCORE: 23
ADLS_ACUITY_SCORE: 13
ADLS_ACUITY_SCORE: 13
ADLS_ACUITY_SCORE: 7
ADLS_ACUITY_SCORE: 13
ADLS_ACUITY_SCORE: 27
FALL_HISTORY_WITHIN_LAST_SIX_MONTHS: NO
ADLS_ACUITY_SCORE: 43
ADLS_ACUITY_SCORE: 11
ADLS_ACUITY_SCORE: 21
ADLS_ACUITY_SCORE: 13
ADLS_ACUITY_SCORE: 7
ADLS_ACUITY_SCORE: 37
ADLS_ACUITY_SCORE: 9
ADLS_ACUITY_SCORE: 27
ADLS_ACUITY_SCORE: 9
ADLS_ACUITY_SCORE: 9
ADLS_ACUITY_SCORE: 37
ADLS_ACUITY_SCORE: 9
ADLS_ACUITY_SCORE: 9
ADLS_ACUITY_SCORE: 11
ADLS_ACUITY_SCORE: 9
CONCENTRATING,_REMEMBERING_OR_MAKING_DECISIONS_DIFFICULTY: NO
ADLS_ACUITY_SCORE: 35
WEAR_GLASSES_OR_BLIND: NO
ADLS_ACUITY_SCORE: 23
ADLS_ACUITY_SCORE: 12
ADLS_ACUITY_SCORE: 23
WEAR_GLASSES_OR_BLIND: YES
ADLS_ACUITY_SCORE: 27
ADLS_ACUITY_SCORE: 12
ADLS_ACUITY_SCORE: 23
ADLS_ACUITY_SCORE: 12
ADLS_ACUITY_SCORE: 13
ADLS_ACUITY_SCORE: 13
ADLS_ACUITY_SCORE: 11
ADLS_ACUITY_SCORE: 9
ADLS_ACUITY_SCORE: 7
ADLS_ACUITY_SCORE: 35
ADLS_ACUITY_SCORE: 21
ADLS_ACUITY_SCORE: 35
ADLS_ACUITY_SCORE: 13
ADLS_ACUITY_SCORE: 43
ADLS_ACUITY_SCORE: 5
ADLS_ACUITY_SCORE: 43
ADLS_ACUITY_SCORE: 27
ADLS_ACUITY_SCORE: 12
ADLS_ACUITY_SCORE: 13
ADLS_ACUITY_SCORE: 13
ADLS_ACUITY_SCORE: 27
ADLS_ACUITY_SCORE: 9
ADLS_ACUITY_SCORE: 27
ADLS_ACUITY_SCORE: 13
ADLS_ACUITY_SCORE: 27
ADLS_ACUITY_SCORE: 27
ADLS_ACUITY_SCORE: 11
ADLS_ACUITY_SCORE: 13
BADLS,_PREVIOUS_FUNCTIONAL_LEVEL: INDEPENDENT
ADLS_ACUITY_SCORE: 9
ADLS_ACUITY_SCORE: 27
ADLS_ACUITY_SCORE: 13
ADLS_ACUITY_SCORE: 35
ADLS_ACUITY_SCORE: 7
ADLS_ACUITY_SCORE: 12
ADLS_ACUITY_SCORE: 27
WALKING_OR_CLIMBING_STAIRS_DIFFICULTY: NO
ADLS_ACUITY_SCORE: 21
ADLS_ACUITY_SCORE: 37
ADLS_ACUITY_SCORE: 9
ADLS_ACUITY_SCORE: 13
ADLS_ACUITY_SCORE: 13
FALL_HISTORY_WITHIN_LAST_SIX_MONTHS: YES
DRESSING/BATHING_DIFFICULTY: NO
ADLS_ACUITY_SCORE: 9
ADLS_ACUITY_SCORE: 9
ADLS_ACUITY_SCORE: 13
EQUIPMENT_CURRENTLY_USED_AT_HOME: WALKER, ROLLING
ADLS_ACUITY_SCORE: 35
ADLS_ACUITY_SCORE: 9
ADLS_ACUITY_SCORE: 43
ADLS_ACUITY_SCORE: 5
ADLS_ACUITY_SCORE: 13
ADLS_ACUITY_SCORE: 13
ADLS_ACUITY_SCORE: 11
DOING_ERRANDS_INDEPENDENTLY_DIFFICULTY: NO
ADLS_ACUITY_SCORE: 13
ADLS_ACUITY_SCORE: 13
ADLS_ACUITY_SCORE: 35
ADLS_ACUITY_SCORE: 9
DEPENDENT_IADLS:: CLEANING;COOKING;LAUNDRY;MEAL PREPARATION;MEDICATION MANAGEMENT;MONEY MANAGEMENT;TRANSPORTATION
ADLS_ACUITY_SCORE: 13
ADLS_ACUITY_SCORE: 35
ADLS_ACUITY_SCORE: 23
ADLS_ACUITY_SCORE: 12
ADLS_ACUITY_SCORE: 11
TOILETING_ISSUES: NO
ADLS_ACUITY_SCORE: 13
ADLS_ACUITY_SCORE: 43
ADLS_ACUITY_SCORE: 9
ADLS_ACUITY_SCORE: 13
ADLS_ACUITY_SCORE: 11
ADLS_ACUITY_SCORE: 12
ADLS_ACUITY_SCORE: 13
ADLS_ACUITY_SCORE: 7
TOILETING_ISSUES: NO
ADLS_ACUITY_SCORE: 13
ADLS_ACUITY_SCORE: 23
WALKING_OR_CLIMBING_STAIRS: OTHER (SEE COMMENTS)
ADLS_ACUITY_SCORE: 13
ADLS_ACUITY_SCORE: 35
ADLS_ACUITY_SCORE: 13
DRESSING/BATHING_DIFFICULTY: NO
ADLS_ACUITY_SCORE: 13
ADLS_ACUITY_SCORE: 11
ADLS_ACUITY_SCORE: 27
DIFFICULTY_EATING/SWALLOWING: NO
ADLS_ACUITY_SCORE: 9
ADLS_ACUITY_SCORE: 13
ADLS_ACUITY_SCORE: 9
ADLS_ACUITY_SCORE: 13
ADLS_ACUITY_SCORE: 13
ADLS_ACUITY_SCORE: 37
ADLS_ACUITY_SCORE: 9
ADLS_ACUITY_SCORE: 23
ADLS_ACUITY_SCORE: 13
DEPENDENT_IADLS:: CLEANING;COOKING;LAUNDRY;MEAL PREPARATION;MEDICATION MANAGEMENT;MONEY MANAGEMENT;TRANSPORTATION
ADLS_ACUITY_SCORE: 9
ADLS_ACUITY_SCORE: 37
ADLS_ACUITY_SCORE: 23
ADLS_ACUITY_SCORE: 23
ADLS_ACUITY_SCORE: 43
ADLS_ACUITY_SCORE: 35
ADLS_ACUITY_SCORE: 12
ADLS_ACUITY_SCORE: 27
ADLS_ACUITY_SCORE: 37
ADLS_ACUITY_SCORE: 23
ADLS_ACUITY_SCORE: 5
ADLS_ACUITY_SCORE: 43
ADLS_ACUITY_SCORE: 11
ADLS_ACUITY_SCORE: 23
ADLS_ACUITY_SCORE: 7
ADLS_ACUITY_SCORE: 13
ADLS_ACUITY_SCORE: 35
ADLS_ACUITY_SCORE: 13
ADLS_ACUITY_SCORE: 13
ADLS_ACUITY_SCORE: 5
ADLS_ACUITY_SCORE: 35
ADLS_ACUITY_SCORE: 13
ADLS_ACUITY_SCORE: 13
ADLS_ACUITY_SCORE: 9
WALKING_OR_CLIMBING_STAIRS_DIFFICULTY: YES
ADLS_ACUITY_SCORE: 9
ADLS_ACUITY_SCORE: 35
ADLS_ACUITY_SCORE: 9
ADLS_ACUITY_SCORE: 5
ADLS_ACUITY_SCORE: 13
ADLS_ACUITY_SCORE: 9
ADLS_ACUITY_SCORE: 12
ADLS_ACUITY_SCORE: 13
ADLS_ACUITY_SCORE: 43
ADLS_ACUITY_SCORE: 12
ADLS_ACUITY_SCORE: 9
ADLS_ACUITY_SCORE: 13
ADLS_ACUITY_SCORE: 9
IADLS,_PREVIOUS_FUNCTIONAL_LEVEL: PARTIAL ASSISTANCE
ADLS_ACUITY_SCORE: 35
ADLS_ACUITY_SCORE: 9
ADLS_ACUITY_SCORE: 13
ADLS_ACUITY_SCORE: 35
ADLS_ACUITY_SCORE: 35
NUMBER_OF_TIMES_PATIENT_HAS_FALLEN_WITHIN_LAST_SIX_MONTHS: 1
ADLS_ACUITY_SCORE: 9
ADLS_ACUITY_SCORE: 27
ADLS_ACUITY_SCORE: 13
ADLS_ACUITY_SCORE: 12
ADLS_ACUITY_SCORE: 5
DOING_ERRANDS_INDEPENDENTLY_DIFFICULTY: NO
ADLS_ACUITY_SCORE: 23
ADLS_ACUITY_SCORE: 13
CONCENTRATING,_REMEMBERING_OR_MAKING_DECISIONS_DIFFICULTY: YES
ADLS_ACUITY_SCORE: 11
ADLS_ACUITY_SCORE: 35
ADLS_ACUITY_SCORE: 27
ADLS_ACUITY_SCORE: 11
ADLS_ACUITY_SCORE: 27
ADLS_ACUITY_SCORE: 27
ADLS_ACUITY_SCORE: 13
ADLS_ACUITY_SCORE: 11
ADLS_ACUITY_SCORE: 13
ADLS_ACUITY_SCORE: 27
ADLS_ACUITY_SCORE: 27
ADLS_ACUITY_SCORE: 37
ADLS_ACUITY_SCORE: 43
ADLS_ACUITY_SCORE: 9
ADLS_ACUITY_SCORE: 13
DIFFICULTY_EATING/SWALLOWING: NO
ADLS_ACUITY_SCORE: 27
ADLS_ACUITY_SCORE: 7
CHANGE_IN_FUNCTIONAL_STATUS_SINCE_ONSET_OF_CURRENT_ILLNESS/INJURY: NO
ADLS_ACUITY_SCORE: 13
ADLS_ACUITY_SCORE: 7
ADLS_ACUITY_SCORE: 12
ADLS_ACUITY_SCORE: 13
ADLS_ACUITY_SCORE: 35
ADLS_ACUITY_SCORE: 13
ADLS_ACUITY_SCORE: 9
ADLS_ACUITY_SCORE: 13

## 2022-01-01 ASSESSMENT — ENCOUNTER SYMPTOMS
SHORTNESS OF BREATH: 0
ABDOMINAL PAIN: 0
MYALGIAS: 0
CHILLS: 0
FEVER: 0
AGITATION: 0
FEVER: 0
TROUBLE SWALLOWING: 1
SORE THROAT: 1
COUGH: 1
ABDOMINAL PAIN: 0
SHORTNESS OF BREATH: 0
APPETITE CHANGE: 1
DYSURIA: 0
WEAKNESS: 1
DIFFICULTY URINATING: 1
FATIGUE: 1
DYSURIA: 0

## 2022-01-01 ASSESSMENT — COPD QUESTIONNAIRES: COPD: 0

## 2022-02-16 PROBLEM — I50.33 ACUTE ON CHRONIC DIASTOLIC HEART FAILURE (H): Status: ACTIVE | Noted: 2022-01-01

## 2022-02-16 PROBLEM — S72.002A HIP FRACTURE, LEFT, CLOSED, INITIAL ENCOUNTER (H): Status: ACTIVE | Noted: 2022-01-01
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
## 2022-02-16 PROBLEM — I13.0: Status: ACTIVE | Noted: 2022-01-01

## 2022-02-16 PROBLEM — F03.90 DEMENTIA (H): Status: ACTIVE | Noted: 2022-01-01

## 2022-02-16 NOTE — CONSULTS
"Care Management Initial Consult    General Information  Assessment completed with: Children, Annia daughter via phone  Type of CM/SW Visit: Initial Assessment    Primary Care Provider verified and updated as needed: Yes   Readmission within the last 30 days: no previous admission in last 30 days      Reason for Consult: discharge planning  Advance Care Planning: Advance Care Planning Reviewed: other (see comments) (\"don't have\")          Communication Assessment  Patient's communication style: spoken language (English or Bilingual)             Cognitive  Cognitive/Neuro/Behavioral: WDL                      Living Environment:   People in home: alone     Current living Arrangements: assisted living  Name of Facility: [facility]   Able to return to prior arrangements: no (likely needs TCU)       Family/Social Support:  Care provided by: self, child(sloane) (\"and AL staff\")  Provides care for: no one, unable/limited ability to care for self  Marital Status:   Children, Facility resident(s)/Staff          Description of Support System: Supportive, Involved    Support Assessment: Adequate family and caregiver support, Adequate social supports    Current Resources:   Patient receiving home care services: No     Community Resources: Skilled Nursing Facility ([facility])  Equipment currently used at home: walker, rolling  Supplies currently used at home: Other (\"glasses\")    Employment/Financial:  Employment Status: retired        Financial Concerns:     Referral to Financial Worker: No       Lifestyle & Psychosocial Needs:  Social Determinants of Health     Tobacco Use: Not on file   Alcohol Use: Not on file   Financial Resource Strain: Not on file   Food Insecurity: Not on file   Transportation Needs: Not on file   Physical Activity: Not on file   Stress: Not on file   Social Connections: Not on file   Intimate Partner Violence: Not on file " "  Depression: Not on file   Housing Stability: Not on file       Functional Status:  Prior to admission patient needed assistance:   Dependent ADLs:: Ambulation-walker, Bathing (\"daughter does standby assist for bathing\")  Dependent IADLs:: Cleaning, Cooking, Laundry, Shopping, Meal Preparation, Medication Management, Transportation  Assesssment of Functional Status: Not at baseline with ADL Functioning, Not at baseline with mobility, Not at  functional baseline    Mental Health Status:          Chemical Dependency Status:                Values/Beliefs:  Spiritual, Cultural Beliefs, Jain Practices, Values that affect care:                 Additional Information:  Holli lives at [facility]. She just moved there on 1/28/22. She moved from a senior Independent Living apartment.     She gets help from her daughter Deepa with a \"standby assist with bathing\". Her other daughter Annia \"helps with medication set up and calls her at least 2 times a day\". Facility helps with, \"housekeeping, laundry. And some meals. For meals she \"normally does her own meal for breakfast. She goes to the restaurant at the facility for lunch. Her son helps her order from the menu at the facility for her dinner meal. And the dinner meal gets delivered to her room.\"    She \"had stopped using her walker back in December 2021. She just started using the walker again a few days ago for pain.\"    She will likely need a TCU at discharge. She has been at [facility] recently and daughter Annia states, \"I would prefer she not return there if possible\".  TCU choices:  1. [facility]  2 and 3. [facility] or [place].    [facility] transport at discharge or daughter will transport if Holli can get into the car.    Ruby Whatley RN      "

## 2022-02-16 NOTE — ED NOTES
[facility] ED Handoff Report    ED Chief Complaint: fall    ED Diagnosis:  (S72.002A) Hip fracture, left, closed, initial encounter (H)  Comment: Seen by  - [facility]  Plan: surgery tomorrow at 1230 -NPO after midnite    (W19.XXXA) Fall, initial encounter  Comment:   Plan:        PMH:    Past Medical History:   Diagnosis Date     CHF (congestive heart failure) (H)      CKD (chronic kidney disease)      Dementia (H)      Depressive disorder      HLD (hyperlipidemia)      Hypertension         Code Status:  Full Code     Falls Risk: Yes Band: Applied    Current Living Situation/Residence: lives in an assisted living facility     Elimination Status: Continent: No     Activity Level: SBA w/ walker    Patients Preferred Language:  English     Needed: No    Vital Signs:  BP (!) 147/70   Pulse 75   Temp 97.7  F (36.5  C) (Oral)   Resp 14   SpO2 97%      Cardiac Rhythm: nsr    Pain Score: 0/10    Is the Patient Confused:  Yes    Last Food or Drink: 2/15/22 at     Focused Assessment:  Pt with fractured hip requiring a partial hip replacement, scheduled for surgery tomorrow at 1230 with Dr. Marvin from [facility].  Pt has hx of dementia and is forgetful.  Will answer one way and then next minute has a different answer to same question.  Pt has declined pain meds, denies pain.  Pt has a pure wick in place    Tests Performed: Done: Labs and Imaging    Treatments Provided:  Fluids    Family Dynamics/Concerns: No    Family Updated On Visitor Policy: Yes    Plan of Care Communicated to Family: Yes    Who Was Updated about Plan of Care: Awilda Jaimes dtr by Dr. Jewel Hernandez Checklist Done and Signed by Patient: Yes    Medications sent with patient: no    Covid: asymptomatic , negative    Additional Information: pure wick and fluids infusing    RN: Jimena Snyder   2/16/2022 5:19 PM

## 2022-02-16 NOTE — ED NOTES
Pt's daughter , Awilda Jaimes notified of plan for inpt admit, pt in ER at this time, with plan to contact family when inpt room is available.

## 2022-02-16 NOTE — PHARMACY-ADMISSION MEDICATION HISTORY
Pharmacy Note - Admission Medication History    Pertinent Provider Information: Patient was prescribed alendronate last year but refused to take it per daughter. Daughter Majo helps manage patient's medications.    ______________________________________________________________________    Prior To Admission (PTA) med list completed and updated in EMR.       PTA Med List   Medication Sig Last Dose     acetaminophen (TYLENOL) 500 MG tablet Take 1,000 mg by mouth 2 times daily 2/16/2022 at AM     buPROPion (WELLBUTRIN XL) 150 MG 24 hr tablet Take 150 mg by mouth daily 2/16/2022 at AM     cholecalciferol 50 MCG (2000 UT) tablet Take 2,000 Units by mouth daily 2/16/2022 at AM     diltiazem ER COATED BEADS (CARDIZEM CD/CARTIA XT) 120 MG 24 hr capsule Take 120 mg by mouth daily 2/16/2022 at AM     dorzolamide-timolol PF (COSOPT) 22.3-6.8 MG/ML opthalmic solution Place 1 drop Into the left eye 2 times daily 2/16/2022 at AM     gemfibroziL (LOPID) 600 MG tablet [GEMFIBROZIL (LOPID) 600 MG TABLET] Take 600 mg by mouth 2 (two) times a day before meals. 2/16/2022 at AM     latanoprost (XALATAN) 0.005 % ophthalmic solution Place 1 drop into both eyes At Bedtime  2/15/2022 at HS     melatonin 3 MG tablet Take 1 tablet (3 mg) by mouth At Bedtime 2/15/2022 at HS     multivitamin  with lutein (OCUVITE WITH LUTEIN) CAPS per capsule Take 1 capsule by mouth daily 2/16/2022 at AM     omeprazole (PRILOSEC) 20 MG DR capsule Take 20 mg by mouth daily 2/16/2022 at AM     Probiotic Product (PROBIOTIC-10 PO) Take 1 tablet by mouth daily 2/16/2022 at AM     sodium bicarbonate 650 MG tablet Take 1 tablet (650 mg) by mouth 2 times daily 2/16/2022 at AM       Information source(s): Patient, Family member and CareEverywhere/SureScripts  Method of interview communication: in-person and phone (Called daughter Majo to confirm meds)    Summary of Changes to PTA Med List  New: vitamin D, probiotic, ocuvite, bupropion  Discontinued:  senna-docusate, miralax, metamucil, methocarbamol, bisacodyl, citalopram  Changed: Diltiazem 240mg to 120mg    Patient was asked about OTC/herbal products specifically.  PTA med list reflects this.    Allergies were reviewed, assessed, and updated with the patient.      Medications currently not available for use during hospital stay. Family/Patient representative states they will bring latanoprost & Cosopt to [facility]. Daughter Majo said she could bring eye drops once patient has a room.    The information provided in this note is only as accurate as the sources available at the time of the update(s).    Thank you for the opportunity to participate in the care of this patient.    Staci Campos, [facility]  2/16/2022 2:48 PM

## 2022-02-16 NOTE — ED PROVIDER NOTES
EMERGENCY DEPARTMENT ENCOUNTER      NAME: Holli Richardson  AGE: 86 year old female  YOB: 1935  MRN: [id number]  EVALUATION DATE & TIME: 2/16/2022 10:47 AM    PCP: Everardo English    ED PROVIDER: Breanna Fiore MD    No chief complaint on file.        FINAL IMPRESSION:  1. Hip fracture, left, closed, initial encounter (H)    2. Fall, initial encounter          ED COURSE & MEDICAL DECISION MAKING:    Pertinent Labs & Imaging studies reviewed. (See chart for details)  86 year old female with history of dementia, HTN, HLD, CKD and CHF who presents to the Emergency Department for evaluation of fall, describing her legs just giving out from underneath her while she was standing making breakfast.  Regular habit denies any headache, LOC.  Based on age will obtain neuroimaging to evaluate for skull fracture, ICH as well as occult fracture of the cervical spine given her age and likely some underlying osteoporosis.  She has tenderness to palpation of the low thoracic/upper lumbar spine junction and will obtain imaging of same to evaluate for fracture.  Her left leg is shortened and externally rotated at the hip.  Suspect fracture of the hip versus dislocation, pelvic fracture.    Patient placed on monitor, IV established and blood obtained.  Twelve-lead EKG shows sinus rhythm, LVH with repolarization abnormality more prominent than her previous.  Patient given 2 mg morphine IV, 975 mg Tylenol orally, states pain is improved.  CBC, BMP, troponin notable for CKD with BUN of 32, creatinine of 1.64 and bicarb of 18 that actually improved from her previous.  Troponin 0.18, nonnegative but not technically abnormal.  Covid swab pending as is type and screen.  CT of the head, cervical spine shows some age-related changes but no acute abnormalities.  CT of the thoracic and lumbar spine shows prominent dextrocurvature of the lower thoracic spine progressed from her previous CT but no acute abnormalities.   "X-ray of the pelvis and left femur show a displaced left femoral neck fracture.  Case discussed with orthopedic surgery, patient is n.p.o. and plan will be for operative management later today.  Admitted to medicine.      ED Course as of 02/16/22 1254   Wed Feb 16, 2022   1050 I met with the patient to gather history and to perform my initial exam. I discussed the plan for care while in the Emergency Department. PPE (gloves and surgical mask) was worn by me during patient encounters while patient wore mask.    1153 WBC(!): 12.7   1203 Carbon Dioxide(!): 18  chronic   1203 Creatinine(!): 1.64  Improved from previous    1214 Troponin I: 0.18   1228 I discussed the case with Dr. Multani, orthopedics   1245 I re-evaluated and updated patient.       At the conclusion of the encounter I discussed the results of all of the tests and the disposition. The questions were answered. The patient or family acknowledged understanding and was agreeable with the care plan.    CONSULTS:  Orthopedic surgery Dr. Multani      MEDICATIONS GIVEN IN THE EMERGENCY:  Medications   sodium chloride 0.9% infusion (has no administration in time range)   acetaminophen (TYLENOL) tablet 975 mg (975 mg Oral Given 2/16/22 1123)   morphine (PF) injection 2 mg (2 mg Intravenous Given 2/16/22 1124)       NEW PRESCRIPTIONS STARTED AT TODAY'S ER VISIT  New Prescriptions    No medications on file          =================================================================    HPI    Patient information was obtained from: Patient    Use of : N/A        Holli Richardson is a 86 year old female with pertinent medical history of HTN, CHF, HLD, CKD4, dementia, osteoporosis, who presents via EMS for evaluation after an unwitnessed fall.    Patient reports PTA she preparing food for breakfast and then fell. She states it \"just happened\" and is unable to recall how she fell. Patient does note she hit her head on a stool but denies loss of consciousness " or syncope. Patient did not attempt to get up on her own. Patient does endorses some low back pain and left groin pain. Denies neck pain. Patient states she recently moved into an assisted living facility. She denies additional medical concerns or complaints at this time.      Per EMS report, patient does not take blood thinning medications.    REVIEW OF SYSTEMS  Constitutional:  Denies fever, chills, weight loss or weakness  Eyes:  No pain, discharge, redness  HENT: Positive for head trauma. Denies sore throat, ear pain, congestion  Respiratory: No SOB, wheeze or cough  Cardiovascular:  No CP, palpitations  GI:  Denies abdominal pain, nausea, vomiting, diarrhea  : Denies dysuria, denies hematuria  Musculoskeletal: Positive for fall, left groin pain, and low back pain. Denies any new swelling or loss of function.  Skin:  Denies rash, pallor  Neurologic:  Denies loss of consciousness, syncope, headache, focal weakness or sensory changes  Lymph: Denies swollen nodes    All other systems negative unless noted in HPI.      PAST MEDICAL HISTORY:  Past Medical History:   Diagnosis Date     CHF (congestive heart failure) (H)      CKD (chronic kidney disease)      Dementia (H)      Depressive disorder      HLD (hyperlipidemia)      Hypertension        PAST SURGICAL HISTORY:  Past Surgical History:   Procedure Laterality Date     PICC DOUBLE LUMEN PLACEMENT  9/22/2021            CURRENT MEDICATIONS:    Prior to Admission Medications   Prescriptions Last Dose Informant Patient Reported? Taking?   bisacodyl (DULCOLAX) 10 MG suppository   No No   Sig: Place 1 suppository (10 mg) rectally daily as needed for constipation   citalopram (CELEXA) 10 MG tablet   No No   Sig: Take 1 tablet (10 mg) by mouth daily   diltiazem ER (DILT-XR) 240 MG 24 hr ER beaded capsule   Yes No   Sig: Take 240 mg by mouth daily   dorzolamide-timolol PF (COSOPT) 22.3-6.8 MG/ML opthalmic solution   Yes No   Sig: Place 1 drop Into the left eye 2 times  daily   gemfibroziL (LOPID) 600 MG tablet   Yes No   Sig: [GEMFIBROZIL (LOPID) 600 MG TABLET] Take 600 mg by mouth 2 (two) times a day before meals.   latanoprost (XALATAN) 0.005 % ophthalmic solution   Yes No   Sig: Place 1 drop into both eyes At Bedtime    melatonin 3 MG tablet   No No   Sig: Take 1 tablet (3 mg) by mouth At Bedtime   methocarbamol (ROBAXIN) 500 MG tablet   No No   Sig: Take 1 tablet (500 mg) by mouth 2 times daily as needed for muscle spasms   omeprazole (PRILOSEC) 20 MG DR capsule   Yes No   Sig: Take 20 mg by mouth daily   polyethylene glycol (MIRALAX) 17 GM/Dose powder   No No   Sig: Take 17 g by mouth daily as needed for constipation   psyllium (METAMUCIL/KONSYL) Packet   Yes No   Sig: Take 1 packet by mouth 2 times daily   senna-docusate (SENOKOT-S/PERICOLACE) 8.6-50 MG tablet   No No   Sig: Take 1 tablet by mouth daily Can take up to 1 tab extra daily as needed for constipation   sodium bicarbonate 650 MG tablet   No No   Sig: Take 1 tablet (650 mg) by mouth 2 times daily      Facility-Administered Medications: None       ALLERGIES:  Allergies   Allergen Reactions     Statins-Hmg-Coa Reductase Inhibitors [Hmg-Coa-R Inhibitors] Muscle Pain (Myalgia)       FAMILY HISTORY:  History reviewed. No pertinent family history.    SOCIAL HISTORY:  Social History     Tobacco Use     Smoking status: None     Smokeless tobacco: None   Substance Use Topics     Alcohol use: None     Drug use: None        PHYSICAL EXAM    General Appearance: Appears frail, cachectic, no acute distress.   Head:  Normocephalic, atraumatic  Eyes:  PERRL, conjunctiva/corneas clear  ENT:  membranes are moist without pallor  Neck:  Supple, no midline tenderness palpation  Chest:  No tenderness or deformity, no crepitus  Cardio:  Regular rate and rhythm, no murmur/gallop/rub  Pulm:  No respiratory distress, clear to auscultation bilaterally  Back:  No midline C spine tenderness to palpation, tenderness at lower thoracic/upper  lumbar junction, no paraspinal tenderness, No CVA tenderness, normal ROM  : Was incontinent of urine  Abdomen:  Soft, non-tender, non distended,no rebound or guarding.  Extremities: Left leg is shortened and externally rotated at the hip, only mild pain with range of motion of left hip.  Otherwise, extremities otherwise normal, atraumatic, no cyanosis or edema, full ROM and motor tone intact, bilateral pulses intact upper and lower  Skin:  Skin warm, dry, no rashes  Neuro:  Alert and oriented ×3, GCS 15, moving all extremities, no gross sensory defects     RADIOLOGY/LABS:  Reviewed all pertinent imaging. Please see official radiology report. All pertinent labs reviewed and interpreted.    Results for orders placed or performed during the hospital encounter of 02/16/22   CT Head w/o Contrast    Impression    CONCLUSION:  HEAD CT:  1.  No acute intracranial abnormality.  2.  No acute calvarial fracture or scalp hematoma.  3.  Stable mild to moderate chronic age-related changes.    CERVICAL SPINE CT:  1.  No acute fracture or traumatic subluxation of the cervical spine by CT imaging.  2.  No high-grade spinal canal or neural foraminal stenosis.   Cervical spine CT w/o contrast    Impression    CONCLUSION:  HEAD CT:  1.  No acute intracranial abnormality.  2.  No acute calvarial fracture or scalp hematoma.  3.  Stable mild to moderate chronic age-related changes.    CERVICAL SPINE CT:  1.  No acute fracture or traumatic subluxation of the cervical spine by CT imaging.  2.  No high-grade spinal canal or neural foraminal stenosis.   CT Thoracic Spine w/o Contrast    Impression    IMPRESSION:  THORACIC SPINE CT:  1.  No fracture or posttraumatic subluxation.  2.  Multilevel thoracic spondylosis and prominent lower thoracic dextrocurvature which have progressed compared to the previous chest CT from 2019.  3.  No high-grade central spinal canal stenosis. Multilevel neural foraminal stenosis as above.  4.  Chronic  posttraumatic deformities of multiple left posterior lateral ribs. Large hiatal hernia.    LUMBAR SPINE CT:  1.  No fracture or posttraumatic subluxation.  2.  Multilevel lumbar spondylosis with associated multilevel spinal canal or neural foraminal stenosis as above.     Lumbar spine CT w/o contrast    Impression    IMPRESSION:  THORACIC SPINE CT:  1.  No fracture or posttraumatic subluxation.  2.  Multilevel thoracic spondylosis and prominent lower thoracic dextrocurvature which have progressed compared to the previous chest CT from 2019.  3.  No high-grade central spinal canal stenosis. Multilevel neural foraminal stenosis as above.  4.  Chronic posttraumatic deformities of multiple left posterior lateral ribs. Large hiatal hernia.    LUMBAR SPINE CT:  1.  No fracture or posttraumatic subluxation.  2.  Multilevel lumbar spondylosis with associated multilevel spinal canal or neural foraminal stenosis as above.     XR Pelvis 1/2 Views    Impression    IMPRESSION: Acute displaced left femoral neck fracture. No left hip dislocation. Mid to distal left femur appears intact. Left total knee arthroplasty with patellar resurfacing. Arterial calcification. Diffuse bone demineralization. Mild bilateral hip   osteoarthritis. No displaced pelvic fracture seen. Degenerative change at the symphysis pubis.    NOTE: ABNORMAL REPORT    THE DICTATION ABOVE DESCRIBES AN ABNORMALITY FOR WHICH FOLLOW-UP IS NEEDED.    XR Femur Left 2 Views    Impression    IMPRESSION: Acute displaced left femoral neck fracture. No left hip dislocation. Mid to distal left femur appears intact. Left total knee arthroplasty with patellar resurfacing. Arterial calcification. Diffuse bone demineralization. Mild bilateral hip   osteoarthritis. No displaced pelvic fracture seen. Degenerative change at the symphysis pubis.    NOTE: ABNORMAL REPORT    THE DICTATION ABOVE DESCRIBES AN ABNORMALITY FOR WHICH FOLLOW-UP IS NEEDED.    Basic metabolic panel   Result  Value Ref Range    Sodium 141 136 - 145 mmol/L    Potassium 4.3 3.5 - 5.0 mmol/L    Chloride 111 (H) 98 - 107 mmol/L    Carbon Dioxide (CO2) 18 (L) 22 - 31 mmol/L    Anion Gap 12 5 - 18 mmol/L    Urea Nitrogen 32 (H) 8 - 28 mg/dL    Creatinine 1.64 (H) 0.60 - 1.10 mg/dL    Calcium 10.3 8.5 - 10.5 mg/dL    Glucose 105 70 - 125 mg/dL    GFR Estimate 31 (L) >60 mL/min/1.73m2   CBC with platelets   Result Value Ref Range    WBC Count 12.7 (H) 4.0 - 11.0 10e3/uL    RBC Count 4.34 3.80 - 5.20 10e6/uL    Hemoglobin 13.5 11.7 - 15.7 g/dL    Hematocrit 42.4 35.0 - 47.0 %    MCV 98 78 - 100 fL    MCH 31.1 26.5 - 33.0 pg    MCHC 31.8 31.5 - 36.5 g/dL    RDW 15.7 (H) 10.0 - 15.0 %    Platelet Count 456 (H) 150 - 450 10e3/uL   Result Value Ref Range    Troponin I 0.18 0.00 - 0.29 ng/mL       EKG:  Performed at: 1105    Impression: Sinus rhythm. Left ventricular hypertrophy with repolarization abnormality. Poor data quality    Rate: 82 bpm  Rhythm: Sinus  Axis: 36  OH Interval: 142 ms  QRS Interval: 82 ms  QTc Interval: 36 ms  ST Changes: Nonspecific ST change in inferior lead.  Comparison: 9/22/21; repolarization abnormality more prominent Nonspecific change in ST segment in inferior lead.  I have independently reviewed and interpreted the EKG(s) documented above.      The creation of this record is based on the scribe s observations of the work being performed by Breanna Fiore MD and the provider s statements to them. It was created on his behalf by Esme Briggs, a trained medical scribe. This document has been checked and approved by the attending provider.    Breanna Fiore MD  Emergency Medicine  [facility] EMERGENCY DEPARTMENT  [facility] [facility] [id number] [phone]  Dept: [phone]     Breanna Fiore MD  02/16/22 5933

## 2022-02-16 NOTE — H&P
[facility] History and Physical       A/P    Acute displaced left femoral neck fracture due to unwitnessed mechanical fall:  CT head and CT C/T/L spine negative for acute intracranial process and negative for acute fracture/dislocation.  Left femur xray confirmed acute displaced left femoral neck fracture.  -NPO  -orthopedic surgery consult  -tylenol 975mg TID, IV Dilaudid 0.2mg q3h prn  -bed rest  -No evidence of ACS, acute decompensated heart failure, symptomatic severe valvular heart disease, unstable cardiac dysrhythmias.  Nuclear stress test 6/2021 negative for ischemia/infarction.  TTE 9/2021 LLVEF 60-65%, grade I diastolic dysfunction, 1+MR, mild LVOT present.  No RWMA's.  Proceed with surgery without further perioperative cardiac evaluation.      HTN:  Await pharmacy to complete med rec    CKD stage II/IV: stable  -CMP a.m.    Leukocytosis: no e/o active infection.  Suspect reactive to problem #1.  -cbc a.m.    Mild dementia: a&ox3.  Monitor for acute delirium postop.    Full code    covid 19 pcr               Chief Complaint:     Fall, left hip pain     HPI:    86 y.o. female with a PMHx of mild dementia, CKD stage III/IV, HTN, grade I diastolic dysfunction who presented to ED by EMS due to an unwitnessed mechanical fall at her residence at [facility].  She did hit her head on the chair but denies any LOC.  She reported that she felt her legs give out while making breakfast this morning.  She was unable to get up.  She reported LBP and left hip pain.  In the ED, left femur xray confirmed acute displaced left femoral neck fracture.  CT head and CT C/T/L spine negative for acute intracranial process and negative for acute fracture/dislocation.  Admitted for further evaluation and management.         Past Medical History:     Past Medical History:   Diagnosis Date     CHF (congestive heart failure) (H)      CKD (chronic kidney disease)      Dementia (H)      Depressive disorder      HLD  (hyperlipidemia)      Hypertension              Past Surgical History:      Past Surgical History:   Procedure Laterality Date     PICC DOUBLE LUMEN PLACEMENT  9/22/2021                  Social History:     Social History     Tobacco Use     Smoking status: Not on file     Smokeless tobacco: Not on file   Substance Use Topics     Alcohol use: Not on file             Family History:   History reviewed. No pertinent family history.  Family history reviewed and updated in EPIC            Allergies:     Allergies   Allergen Reactions     Statins-Hmg-Coa Reductase Inhibitors [Hmg-Coa-R Inhibitors] Muscle Pain (Myalgia)             Medications:   reviewed         Review of Systems:     The Review of Systems is negative other than noted in the HPI      BP (!) 147/70   Pulse 75   Temp 97.7  F (36.5  C) (Oral)   Resp 14   SpO2 97%    Physical Examination:   General appearance - alert, and in no distress  Eyes - pupils equal and reactive, extraocular eye movements intact, sclera anicteric  Mouth - mucous membranes moist, pharynx normal without lesions  Lungs - clear to auscultation, no wheezes, rales or rhonchi, symmetric air entry  Heart - normal rate, regular rhythm, normal S1, S2, no murmurs, rubs, clicks or gallops. No peripheral edema.  Abdomen - soft, nontender, nondistended, no masses or organomegaly, BS+  Neurological - a&ox3  MSK:  LLE: mild left hip ttp. neurovasc fxn intact to LLE.    Skin - no c/c/p            Data:   Lab/imaging studies reviewed    ECG from admission personally reviewed.  NSR. No acute ischemic changes. qtc 455. LVH.         Ottoniel Dupont DO, DO

## 2022-02-16 NOTE — CONSULTS
ORTHOPEDIC CONSULTATION    Consultation  Holli Richardson,  1935, MRN [id number]    [unfilled]  Fall, initial encounter [W19.XXXA]    PCP: Everardo English, [phone]   Code status:  Full Code       Extended Emergency Contact Information  Primary Emergency Contact: Annia Osullivan  Mobile Phone: [phone]  Relation: Daughter  Secondary Emergency Contact: CLAUDIA RICHARDSON  Home Phone: [phone]  Mobile Phone: [phone]  Relation: Son         CHIEF COMPLAINT: <principal problem not specified>      HISTORY OF PRESENT ILLNESS:  The patient is seen in orthopedic consultation at the request of Dr. Neil Fiore MD.  The patient is a 86 year old female with severe pain of the left  hip. The patient reports that she sustained a fall from standing and had immediate pain and inability weight-bear in her left side..The patient describes their pain as severe. . The patient reports that their pain is alleviated by rest and is exacerbated by movement.  At her baseline she lives at an assisted living facility.  It is unclear why she mobilizes with a walker but it sounds like she does walk.  She has had bilateral knee replacements.  I spoke with her daughter as well who confirmed this.    PAST MEDICAL HISTORY:  Hypertension, hyperlipidemia, stage IV CKD, CHF      ALLERGIES:   Review of patient's allergies indicates   Allergies   Allergen Reactions     Statins-Hmg-Coa Reductase Inhibitors [Hmg-Coa-R Inhibitors] Muscle Pain (Myalgia)         MEDICATIONS UPON ADMISSION:  Medications were reviewed.  They include:   (Not in a hospital admission)        SOCIAL HISTORY:   she        FAMILY HISTORY:  family history is not on file.      REVIEW OF SYSTEMS:   See HPI, otherwise negative       PHYSICAL EXAMINATION:  Vitals: Temp:  [97.7  F (36.5  C)] 97.7  F (36.5  C)  Pulse:  [74-81] 75  Resp:  [14-22] 14  BP: (147-182)/(70-84) 147/70  SpO2:  [97 %-99 %] 97 %  General: On examination, the patient is resting  comfortably, NAD, awake and alert and oriented to person, place, time, and and general circumstances   SKIN: There is no evidence of erythema, ecchymosis, abrasions, or and lacerations   Pulses:  brachial, radial, dorsalis pedis and posterior tibial  Sensation: intact and equal bilaterally  Tenderness: About the left leg, pain with axial load and logroll  ROM: Range of motion of the left hip deferred due to pain  Motor: Intact motor dorsiflex, plantarflexion, EHL  Contralateral side= Full range of motion, Negative joint instability findings, 5/5 motor groups about the joint, Non-tender.       RADIOGRAPHIC EVALUATION:   I personally viewed AP pelvis and left femur x-rays.  These demonstrate a displaced left femoral neck fracture.  She has a total knee implant distally.    Pertinent Labs  Lab Results: personally reviewed.   Lab Requisition on 02/07/2022   Component Date Value     Sodium 02/07/2022 147 (A)     Potassium 02/07/2022 4.1      Chloride 02/07/2022 116 (A)     Carbon Dioxide (CO2) 02/07/2022 18 (A)     Anion Gap 02/07/2022 13      Urea Nitrogen 02/07/2022 31 (A)     Creatinine 02/07/2022 2.30 (A)     Calcium 02/07/2022 9.3      Glucose 02/07/2022 57 (A)     GFR Estimate 02/07/2022 20 (A)   Lab Requisition on 01/20/2022   Component Date Value     Sodium 01/20/2022 126 (A)     Potassium 01/20/2022 4.1      Chloride 01/20/2022 103      Carbon Dioxide (CO2) 01/20/2022 12 (A)     Anion Gap 01/20/2022 11      Urea Nitrogen 01/20/2022 37 (A)     Creatinine 01/20/2022 1.72 (A)     Calcium 01/20/2022 8.5      Glucose 01/20/2022 93      Albumin 01/20/2022 2.7 (A)     Phosphorus 01/20/2022 3.8      GFR Estimate 01/20/2022 29 (A)     Magnesium 01/20/2022 1.9      Creatinine Urine mg/dL 01/20/2022 57      Total Protein Urine mg/dL 01/20/2022 100.3      Total Protein UR MG/MG CR 01/20/2022 1.76      Creatinine Urine mg/dL 01/20/2022 57        IMPRESSION:   86-year-old female with a displaced left femoral neck fracture      PLAN:  I discussed with the patient as well as her daughter on the phone diagnosis and treatment options.  I made a recommendation for left hip hemiarthroplasty given her displaced femoral neck fracture.  I do think is the best way to mobilize her and prevent medical complications.  I explained the procedure as well as the risk, benefits, alternatives.  The patient understood the procedure and agreed to proceed.  All questions were answered for the family member as well.  Her number is in the chart she requested to be called at the time about the operation.  I reviewed the risks of bleeding, infection, damage to nerves, nerves blood vessels, risk of fracture, risk of dislocation, risk of anesthesia complications including death.  They understood this and agreed to proceed with surgery.  Unfortunately, we are trying to get this done tonight but no ORs or availability due to 2 emergent cases.  We will plan for surgery tomorrow in the morning.  I appreciate the medical team's management and they have already optimized her for the operation.  She is allowed to eat tonight and we will make her n.p.o. after midnight for the operation.  This to be done by my partner, Dr. Marvin who is aware of this patient and will see her tomorrow as well.        Thank you for including [facility] in the care of Holli Richardson. It has been a pleasure participating in their care.    CLAUDIA GALO MD      CC1:   Ottoneil Dupont DO    CC2:   Everardo English

## 2022-02-16 NOTE — ED NOTES
Bed: [facility]NED[facility]-03  Expected date: 2/16/22  Expected time:   Means of arrival: Ambulance  Comments:  WBL  86 F  fall

## 2022-02-17 PROBLEM — N18.4 STAGE 4 CHRONIC KIDNEY DISEASE (H): Status: ACTIVE | Noted: 2021-09-22

## 2022-02-17 PROBLEM — R33.8 ACUTE URINARY RETENTION: Status: ACTIVE | Noted: 2022-01-01

## 2022-02-17 PROBLEM — E78.5 HYPERLIPIDEMIA: Status: ACTIVE | Noted: 2021-09-22

## 2022-02-17 PROBLEM — F32.1 MODERATE MAJOR DEPRESSION, SINGLE EPISODE (H): Status: ACTIVE | Noted: 2021-09-22

## 2022-02-17 NOTE — PROGRESS NOTES
Pharmacy wondering if daughter can bring home supply of eye drops for use while in the hospital. Call placed to daughter Annia, which stated that she will bring pt's eye drops to the hospital in the AM.

## 2022-02-17 NOTE — ANESTHESIA PROCEDURE NOTES
Intrathecal injection Procedure Note    Pre-Procedure   Staff -        Anesthesiologist:  Steve Miller MD       Performed By: anesthesiologist       Location: OB       Procedure Start/Stop Times: 2/17/2022 1:10 PM and 2/17/2022 1:16 PM       Pre-Anesthestic Checklist: patient identified, IV checked, risks and benefits discussed, informed consent, monitors and equipment checked, pre-op evaluation, at physician/surgeon's request and post-op pain management  Timeout:       Correct Patient: Yes        Correct Procedure: Yes        Correct Site: Yes        Correct Position: Yes   Procedure Documentation  Procedure: intrathecal injection       Patient Position: sitting       Patient Prep/Sterile Barriers: sterile gloves, mask, patient draped       Skin prep: Chloraprep       Insertion Site: L3-4. (midline approach).       Needle Gauge: 24.        Needle Length (Inches): 3.5        Spinal Needle Type: Sprotte       Introducer used       # of attempts: 1 and  # of redirects:  0    Assessment/Narrative         Paresthesias: No.       Sensory Level: T10       CSF fluid: clear.      Opening pressure was cmH2O while  Sitting.      Medication(s) Administered   0.75% Hyperbaric Bupivacaine (Intrathecal), 1.6 mL  Medication Administration Time: 2/17/2022 1:16 PM           Pt here for evaluation of abdominal distention. Pt is A+Ox3 clear speaking. Emergency Department Nursing Plan of Care The Nursing Plan of Care is developed from the Nursing assessment and Emergency Department Attending provider initial evaluation. The plan of care may be reviewed in the ED Provider note. The Plan of Care was developed with the following considerations:  
Patient / Family readiness to learn indicated by:verbalized understanding Persons(s) to be included in education: patient Barriers to Learning/Limitations:No 
 
Signed Erika Cardenas RN   
1/16/2019   10:15 AM

## 2022-02-17 NOTE — PROGRESS NOTES
Care Management Follow Up    Length of Stay (days): 1    Expected Discharge Date: 02/20/2022    Concerns to be Addressed:   NPO for surgery (Acute displaced left femoral neck fracture due to unwitnessed mechanical fall).   Patient plan of care discussed at interdisciplinary rounds: Yes    Anticipated Discharge Disposition:  Discharge goal is pending response to treatment, medical needs and mobility closer to discharge.      Anticipated Discharge Services:  To be determined by destination, patient/family preferences, medical needs and mobility status closer to the time of discharge.  Anticipated Discharge DME:  Per therapy (if indicated).    Patient/family educated on Medicare website which has current facility and service quality ratings:  Not at this time.   Education Provided on the Discharge Plan:  Per team  Patient/Family in Agreement with the Plan:  Yes    Referrals Placed by CM/SW:  See below.  Private pay costs discussed: Not applicable     Additional Information:  Patient lives at [facility] but just moved there on 1/28/22. She had moved from a senior Independent Living apartment. Her daughter Deepa provides assist with a standby assist with bathing. Her other daughter, Annia, helps with medication set up and calls her at least twice a day. Facility helps with housekeeping, laundry and some meals. For meals, she normally does her own meal for breakfast and goes to the restaurant at the facility for lunch. Her son helps her order from the menu at the facility for her dinner meal which is delivered to her room.  She had stopped using her walker in December 2021 but started using the walker again a few days ago for pain.  She will likely need a TCU at discharge. She has been at [facility] most recently and daughter Annia states, they would prefer she not return there if possible.  1:43 PM:  Met with patient's daughter Awilda Jaimes at the bedside to introduce self.     2:19 PM:  Received a call from Awilda Jaimes stating that they would prefer [facility] if at all possible.   TCU choices:  1. [facility] (Can likely accept on Monday 2/21)  2  [facility] (unable to accept admissions at this time) or   3. [facility] (has the referral and will follow up on Monday 21st: will likely have beds on 2/21 or 2/22 if needed)      Health transport at discharge or daughter will transport (if patient can get into the car).    Mari Power RN

## 2022-02-17 NOTE — BRIEF OP NOTE
[facility]    Brief Operative Note    Pre-operative diagnosis: Closed fracture of left hip, initial encounter (H) [S72.002A]  Post-operative diagnosis Same as pre-operative diagnosis    Procedure: Procedure(s):  HEMIARTHROPLASTY, HIP, BIPOLAR  Surgeon: Surgeon(s) and Role:     * Micah Marvin MD - Primary     * Reynaldo Eli PA-C - Assisting  Anesthesia: Spinal   Estimated Blood Loss: 150 cc    Drains: None  Specimens: * No specimens in log *  Findings:   left femoral neck fracture. .  Complications: None.  Implants:   Implant Name Type Inv. Item Serial No.  Lot No. LRB No. Used Action   BONE CEMENT SIMPLEX W/TOBRAMYCIN 6197-9-001 - [id number] Cement, Bone BONE CEMENT SIMPLEX W/TOBRAMYCIN 6197-9-001  [employer] [id number] Left 2 Implanted   STEM FEMORAL SUMMIT HO CEMENT SZ 4 - SNA Total Joint Component/Insert STEM FEMORAL SUMMIT HO CEMENT SZ 4 NA [employer]- [id number] Left 1 Implanted   Stem Centralizer 9.25mm Cemented   NA Depuy [id number] Left 1 Implanted   Self Centering Bi-Polar Head   NA [employer] [facility] CARE INC-DEPUY [id number] Left 1 Implanted   IMP HEAD FEMORAL DEPUY 28MM +1.5MM 1365- - [id number] Total Joint Component/Insert IMP HEAD FEMORAL DEPUY 28MM +1.5MM 1365-  [employer]- [id number] Left 1 Implanted       Micah Marvin MD  [facility]

## 2022-02-17 NOTE — ANESTHESIA CARE TRANSFER NOTE
Patient: Holli Richardson    Procedure: Procedure(s):  HEMIARTHROPLASTY, HIP, BIPOLAR       Diagnosis: Closed fracture of left hip, initial encounter (H) [S72.002A]  Diagnosis Additional Information: No value filed.    Anesthesia Type:   Spinal     Note:    Oropharynx: oropharynx clear of all foreign objects  Level of Consciousness: awake  Oxygen Supplementation: room air    Independent Airway: airway patency satisfactory and stable  Dentition: dentition unchanged  Vital Signs Stable: post-procedure vital signs reviewed and stable  Report to RN Given: handoff report given  Patient transferred to: PACU    Handoff Report: Identifed the Patient, Identified the Reponsible Provider, Reviewed the pertinent medical history, Discussed the surgical course, Reviewed Intra-OP anesthesia mangement and issues during anesthesia, Set expectations for post-procedure period and Allowed opportunity for questions and acknowledgement of understanding      Vitals:  Vitals Value Taken Time   /57 02/17/22 1601   Temp 37.6  C (99.6  F) 02/17/22 1601   Pulse 74 02/17/22 1605   Resp 16 02/17/22 1605   SpO2 96 % 02/17/22 1605   Vitals shown include unvalidated device data.    Electronically Signed By: JIM Cruz CRNA  February 17, 2022  4:07 PM

## 2022-02-17 NOTE — INTERVAL H&P NOTE
I have reviewed the surgical (or preoperative) H&P that is linked to this encounter, and examined the patient. There are no significant changes.    The patient was seen in consultation with my partner Dr. Sanchez.  As there was no operating room availability yesterday, he asked me to take over the patient's case.  In short, the patient sustained a mechanical fall.  She had left hip pain and inability to ambulate.  She was diagnosed with a left femoral neck fracture.  She has been seen by the hospitalist service and cleared for surgery.  She reports pain only in her left hip.  She had no antecedent hip pain.  She does use a walker for ambulation.    Exam:  General: Well-appearing 86-year-old female, alert and interactive, no apparent distress.  Respiratory: Breathing unlabored on room air  Left lower extremity: The left lower extremity is shortened and externally rotated.  Hip range of motion is deferred.  There is no tenderness to palpation about the left knee, left leg, left ankle, or left foot.  Patient fires TA/GSC/EHL/FHL.  Sensation intact to light touch at the DP/SP/tibial/saphenous/sural nerve distributions.  She has a palpable DP pulse, and her foot is warm and well-perfused.  Right lower extremity: There is no tenderness to palpation, and no pain with range of motion throughout the right lower extremity.    Imaging: X-rays of the pelvis, and the left femur reveal a displaced femoral neck fracture.  No other fracture is noted about the pelvis.  No distal femur fracture is noted.  A knee replacement is in place.    Assessment: 86-year-old female with chronic kidney disease, hypertension, and CHF with a left displaced femoral neck fracture.    Plan: I had a good discussion with the patient and her daughter about our findings today.  We discussed the benefit of left hip hemiarthroplasty would be early mobilization.  We discussed the risks including the risk of anesthesia, bleeding, infection, damage to  surrounding structures like blood vessels and nerves, fracture, instability, need for further surgery.  We discussed the risks of heart attack, stroke, blood clot, pneumonia, and ultimately the remote possibility of death.  After thorough discussion, the patient's daughter and the patient elected to proceed, and signed informed consent was obtained and placed in the chart.  Proceed this afternoon with left hip hemiarthroplasty.    Micah Marvin MD  [facility]

## 2022-02-17 NOTE — OP NOTE
Operative Note    Name:  Holli Richardson  PCP:  Everardo English  Procedure Date:  2/17/2022    Pre-Procedure Diagnosis:  Closed displaced left femoral neck fracture    Post-Procedure Diagnosis:    Closed displaced left femoral neck fracture    Procedure: Left cemented bipolar hemiarthroplasty     Implants: DePuy Underhill cemented size 4 high offset stem with a +1.5 mm head and 46 mm outside diameter bipolar head.    Surgeon(s):  Micah Marvin MD    Assistant: Reynaldo Eli PA-C  The PAs assistance was necessary in soft tissue retraction, maintenance of reduction during fixation, closure of the wound, splint application, and for overall progression and efficiency of the case.      Anesthesia Type:  Spinal with sedation    Estimated Blood Loss:   150 cc    Complications:    None    Indication for procedure: The patient is a pleasant 86-year-old female with a history of CHF and chronic kidney disease.  She sustained a mechanical fall at her assisted living facility.  She was unable to ambulate.  She was brought to the emergency department where a left femoral neck fracture was identified.  Patient was admitted to the hospitalist service and optimized for surgery.  Risks and benefits of the procedure were discussed with the patient and her daughter including infection, bleeding, dislocation, fracture, pain, leg-length discrepancy, damage to nearby structures, and need for further surgery. These risks were understood and they agreed to proceed.  Signed form consent was obtained and placed in the chart.    Procedure: The patient was met in the preoperative holding area.  The correct surgical site was marked with the patient's participation.  Informed consent was again reviewed with the patient, and all questions were answered to her satisfaction and her daughter satisfaction.  The patient was transferred into the operating theater.  She had successful induction of spinal anesthesia by our anesthesia  colleagues.  The patient was then transferred to the operating table.  She was placed in the lateral decubitus position with the left side up.  All bony prominences were well-padded, and an axillary roll was placed.  The Cross hip positioners were used.  The patient was carried to the table using a safety strap, and all bony prominences were well-padded.  The left lower extremity was then prepped and draped in the usual sterile fashion.  A timeout was performed with all members the operating team participating per hospital policy.  The correct patient, site, and procedure were all confirmed.  All in attendance were in agreement.    Posterior approach was taken to the hip.  A longitudinal incision was made over the posterior third of the greater trochanter t and was curved slightly posterior as we went proximal.  Electrocautery was used to dissect through the subcutaneous tissues, and the iliotibial band was identified.  The fascia was opened longitudinally, and the gluteus jose daniel was split using blunt dissection.  With care to palpate the sciatic nerve, the East-West retractor was placed.  A Hohmann was placed between the piriformis and the gluteus minimus, and the piriformis was tagged using #5 Ethibond.  It was then released from its insertion.  The remainder of the short external rotators were dissected off the posterior aspect of the greater trochanter.  The capsule was then identified, and it was opened released off the posterior aspect of the greater trochanter and the femoral neck down to the level of the lesser trochanter.  The capsule was tagged using #5 Ethibond.. The fracture was identified at the femoral neck.  The neck template was used, and at the appropriate level, the neck was resected and the head was removed. It was measured at approximately 45 mm. A 46 mm head trial was placed and this demonstrated excellent stability.    Attention was directed towards the femur.  The proximal femoral  retractors were placed.  Residual soft tissue at the junction between the neck and the greater trochanter was removed using electrocautery.  The box chisel was used for an appropriate starting point with appropriate anteversion.  The canal finder was then used.  The smallest reamer was used to enter the femoral canal, and the lateralizing reamer was then used.  I reamed up to a 4 reamer with adequate chatter.  I then broached with care to lateralize with appropriate anteversion up to a size cemented for.  This had adequate fit and fill of the proximal femur.  A calcar planer was utilized. Trial reduction was carried out and excellent stability was demonstrated with the high offset neck and a +1.5 mm head was utilized. The trial components were removed. The canal was copiously irrigated.  A cement restrictor was placed.  The canal was again copiously irrigated and dried.  A vent tube sponge was placed. Two batches of tobramycin-containing cement were mixed for 120 seconds. These were introduced in a retrograde fashion. The stem was introduced and held in appropriate anteversion until the cement had hardened. Any excess cement was carefully removed. Trial reduction was once again carried out. Good reproduction of leg length and excellent stability was demonstrated. The final components were placed. The hip was reduced. The joint was copiously irrigated. The piriformis and capsule were repaired back to the greater trochanter through 2 drill holes in bone. The IT band was closed with 0 Vicryl in interrupted fashion, and 0 stratafix in running fashion.  2-0 Vicryl was used to close the subcutaneous tissue, and 3-0 Monocryl was used to close the skin.  Skin adhesive was then placed.  Sterile Aquacel dressing was applied. An abductor pillow placed. Patient tolerated the procedure well and without complications and was transferred to the PACU in stable condition.  All sponge and needle counts were correct at the end of the  case.    Post-operative plan  WBAT posterior hip precautions  ASA 325mg daily for 1 month  Follow up in 2 weeks for wound check - scheduled for Friday 3/4/22 at 2:30 PM at [facility].     Micah Marvin MD    Date: 2/17/2022  Time: 3:34 PM      Implant Name Type Inv. Item Serial No.  Lot No. LRB No. Used Action   BONE CEMENT SIMPLEX W/TOBRAMYCIN 6197-9-001 - [id number] Cement, Bone BONE CEMENT SIMPLEX W/TOBRAMYCIN 6197-9-001  [employer] [id number] Left 2 Implanted   STEM FEMORAL SUMMIT HO CEMENT SZ 4 - SNA Total Joint Component/Insert STEM FEMORAL SUMMIT HO CEMENT SZ 4 NA [employer]- [id number] Left 1 Implanted   Stem Centralizer 9.25mm Cemented   NA Depuy [id number] Left 1 Implanted   Self Centering Bi-Polar Head   NA [employer] [facility] CARE INC-DEPUY [id number] Left 1 Implanted   IMP HEAD FEMORAL DEPUY 28MM +1.5MM 1365- - [id number] Total Joint Component/Insert IMP HEAD FEMORAL DEPUY 28MM +1.5MM 1365-  [employer]- [id number] Left 1 Implanted

## 2022-02-17 NOTE — ANESTHESIA PREPROCEDURE EVALUATION
Anesthesia Pre-Procedure Evaluation    Patient: Holli Richardson   MRN: [id number] : 1935        Preoperative Diagnosis: Closed fracture of left hip, initial encounter (H) [S72.002A]    Procedure : Procedure(s):  HEMIARTHROPLASTY, HIP, BIPOLAR          Past Medical History:   Diagnosis Date     CHF (congestive heart failure) (H)      CKD (chronic kidney disease)      Dementia (H)      Depressive disorder      HLD (hyperlipidemia)      Hypertension       Past Surgical History:   Procedure Laterality Date     PICC DOUBLE LUMEN PLACEMENT  2021           Allergies   Allergen Reactions     Statins-Hmg-Coa Reductase Inhibitors [Hmg-Coa-R Inhibitors] Muscle Pain (Myalgia)      Social History     Tobacco Use     Smoking status: Not on file     Smokeless tobacco: Not on file   Substance Use Topics     Alcohol use: Not on file      Wt Readings from Last 1 Encounters:   22 48.4 kg (106 lb 11.2 oz)        Anesthesia Evaluation   Pt has had prior anesthetic.     No history of anesthetic complications       ROS/MED HX  ENT/Pulmonary:    (-) asthma, COPD, sleep apnea and FELICIANO risk factors   Neurologic: Comment: CT head and CT C/T/L spine negative for acute intracranial process and negative for acute fracture/dislocation    (+) dementia,     Cardiovascular: Comment: Nuclear stress test 2021 negative for ischemia/infarction.  TTE 2021 LLVEF 60-65%, grade I diastolic dysfunction, 1+MR, mild LVOT present.  No RWMA's.     (+) hypertension-----CHF     METS/Exercise Tolerance:     Hematologic:       Musculoskeletal:       GI/Hepatic:     (+) GERD, Asymptomatic on medication,     Renal/Genitourinary:     (+) renal disease, type: CRI,     Endo:    (-) obesity   Psychiatric/Substance Use:       Infectious Disease:       Malignancy:       Other:            Physical Exam    Airway        Mallampati: II   TM distance: > 3 FB   Neck ROM: full   Mouth opening: > 3 cm    Respiratory Devices and Support         Dental      Comment: Fair dentition, no loose or removable teeth        Cardiovascular          Rhythm and rate: regular and normal     Pulmonary               Other findings:   COVID negative 2/16    OUTSIDE LABS:  CBC:   Lab Results   Component Value Date    WBC 9.1 02/17/2022    WBC 12.7 (H) 02/16/2022    HGB 10.7 (L) 02/17/2022    HGB 13.5 02/16/2022    HCT 33.4 (L) 02/17/2022    HCT 42.4 02/16/2022     02/17/2022     (H) 02/16/2022     BMP:   Lab Results   Component Value Date     02/17/2022     02/16/2022    POTASSIUM 4.2 02/17/2022    POTASSIUM 4.3 02/16/2022    CHLORIDE 111 (H) 02/17/2022    CHLORIDE 111 (H) 02/16/2022    CO2 17 (L) 02/17/2022    CO2 18 (L) 02/16/2022    BUN 39 (H) 02/17/2022    BUN 32 (H) 02/16/2022    CR 1.96 (H) 02/17/2022    CR 1.64 (H) 02/16/2022    GLC 97 02/17/2022     02/16/2022     COAGS:   Lab Results   Component Value Date    INR 1.07 02/16/2022     POC: No results found for: BGM, HCG, HCGS  HEPATIC:   Lab Results   Component Value Date    ALBUMIN 2.5 (L) 02/17/2022    PROTTOTAL 5.6 (L) 02/17/2022    ALT 12 02/17/2022    AST 18 02/17/2022    ALKPHOS 108 02/17/2022    BILITOTAL 0.2 02/17/2022    SANDRINE 22 09/22/2021     OTHER:   Lab Results   Component Value Date    PH 7.42 09/22/2021    GISELE 9.4 02/17/2022    PHOS 3.8 01/20/2022    MAG 2.0 02/16/2022    TSH 1.45 09/22/2021       Anesthesia Plan    ASA Status:  3   NPO Status:  NPO Appropriate    Anesthesia Type: Spinal.              Consents    Anesthesia Plan(s) and associated risks, benefits, and realistic alternatives discussed. Questions answered and patient/representative(s) expressed understanding.    - Discussed:     - Discussed with:  Patient      - Extended Intubation/Ventilatory Support Discussed: No.      - Patient is DNR/DNI Status: No    Use of blood products discussed: Yes.     - Discussed with: Patient.     - Consented: consented to blood products            Reason for refusal: other.      Postoperative Care    Pain management: Neuraxial analgesia.   PONV prophylaxis: Ondansetron (or other 5HT-3), Dexamethasone or Solumedrol     Comments:    Other Comments: SAB  Propofol gtt  Phenylephrine gtt  Dexamethasone/ondansetron PONV ppx   Avoid benzodiazepines, anticholinergics, diphenhydramine, meperidine 2/2 to age and hx of dementia and risk for post-op delirium    DNR/DNI status rescinded. Patient FULL CODE.             Melva Treadwell MD

## 2022-02-17 NOTE — PLAN OF CARE
Problem: Plan of Care - These are the overarching goals to be used throughout the patient stay.    Goal: Absence of Hospital-Acquired Illness or Injury  2/16/2022 2255 by Karlo Slaughter RN  Outcome: Ongoing, Progressing  2/16/2022 2254 by Karlo Slaughter RN  Outcome: Ongoing, Progressing  Intervention: Identify and Manage Fall Risk  Recent Flowsheet Documentation  Taken 2/16/2022 1825 by Karlo Slaughter RN  Safety Promotion/Fall Prevention:   activity supervised   bed alarm on   patient and family education   room door open   supervised activity   safety round/check completed     Problem: Pain (Orthopaedic Fracture)  Goal: Acceptable Pain Control  Outcome: Ongoing, Progressing     Problem: Neurovascular Compromise (Orthopaedic Fracture)  Goal: Effective Tissue Perfusion  Outcome: Ongoing, Progressing     Pt alert and oriented to self, place, and situation. Pt disoriented to time. Hx of dementia, HTN, CDK 4, KAM, CHF. Pt had a fall at home, which resulted in left hip fracture. Pt to have fracture repaired in the am (2/17/2022) at 1230 w/ Dr. Marvin from [facility]. Pt to be NPO at midnight. Pt denies pain at rest, but c/o pain during repositioning. Pt was given scheduled acetaminophen, which was effective. Refused ice pack. Pt has diminished bilateral lower lobes on auscultation. Pt inc of bladder and is on a purewick. Pt observed to have weakness to bilateral lower extremities (more weakness observed on the LLE). Pt had a fair appetite (ate 100% of meal during supper). Pt educated to use the call light for staff assistance w/ pepper. Call light is within reach. Bed alarm on for patient safety.

## 2022-02-18 NOTE — PROGRESS NOTES
Occupational Therapy      02/18/22 1015   Quick Adds   Type of Visit Initial Occupational Therapy Evaluation   Living Environment   People in Home alone   Current Living Arrangements assisted living   Home Accessibility no concerns   Transportation Anticipated family or friend will provide   Living Environment Comments W/I shower w/ GB no SC- daughter sometimes assists w/ showering    Self-Care   Usual Activity Tolerance moderate   Current Activity Tolerance moderate   Regular Exercise No   Equipment Currently Used at Home none   Fall history within last six months yes   Number of times patient has fallen within last six months 1   Activity/Exercise/Self-Care Comment ALE provides meals/meds/cleaning    Previous Level of Function/Home Environm   Bathing/Grooming, Premorbid Functional Level partial assistance   Dressing, Premorbid Functional Level independent   Eating/Feeding, Premorbid Functional Level independent   Toileting, Premorbid Functional Level independent   BADLs, Premorbid Functional Level independent   IADLs, Premorbid Functional Level partial assistance   Bed Mobility, Premorbid Functional Level independent   General Information   Onset of Illness/Injury or Date of Surgery 02/16/22   Referring Physician Spencer Hamilton MD    Patient/Family Therapy Goal Statement (OT) none stated    Additional Occupational Profile Info/Pertinent History of Current Problem Pt presenting L. hip fx s/p bipolar hemiarthroplasty. PMHx HTN, CKD, Mild cog. impairment.    Existing Precautions/Restrictions no hip IR;no hip ADD past midline;90 degree hip flexion;fall   Left Lower Extremity (Weight-bearing Status) weight-bearing as tolerated (WBAT)   Cognitive Status Examination   Orientation Status not oriented to person, place or time   Range of Motion Comprehensive   General Range of Motion no range of motion deficits identified   Transfers   Transfers sit-stand transfer   Sit-Stand Transfer   Sit-Stand [place] (Transfers)  moderate assist (50% patient effort);maximum assist (25% patient effort);verbal cues   Assistive Device (Sit-Stand Transfers) walker, standard   Activities of Daily Living   BADL Assessment/Intervention grooming   Grooming Assessment/Training   Rogers Level (Grooming) set up;supervision;verbal cues   Position (Grooming) unsupported sitting   Clinical Impression   Criteria for Skilled Therapeutic Interventions Met (OT) Yes, treatment indicated   OT Diagnosis decreased ADL independence/safety   OT Problem List-Impairments impacting ADL problems related to;activity tolerance impaired;cognition;balance;mobility;strength   Assessment of Occupational Performance 3-5 Performance Deficits   Identified Performance Deficits trnf safety w/in LLE precautions, LB dressing, safety awareness    Planned Therapy Interventions (OT) ADL retraining;IADL retraining;balance training;cognition;strengthening   Clinical Decision Making Complexity (OT) moderate complexity   Risk & Benefits of therapy have been explained evaluation/treatment results reviewed;patient   OT Discharge Planning   OT Discharge Recommendation (DC Rec) Transitional Care Facility;home with assist   OT Rationale for DC Rec Assistx 1-2 for trnf/mobility/self care tasks, OT recommending 24 hour supervision/assit upon d/c to ensure safety.   Total Evaluation Time (Minutes)   Total Evaluation Time (Minutes) 5   OT Goals   Therapy Frequency (OT) 2 times/day   OT Predicated Duration/Target Date for Goal Attainment 02/23/22   OT Goals Hygiene/Grooming;Lower Body Dressing;Bed Mobility;Transfers;Toilet Transfer/Toileting   OT: Hygiene/Grooming within precautions;from wheelchair;supervision/stand-by assist   OT: Lower Body Dressing Minimal assist;within precautions;using adaptive equipment   OT: Bed Mobility Minimal assist;within precautions   OT: Transfer Minimal assist;with assistive device;within precautions   OT: Toilet Transfer/Toileting Minimal assist;using adaptive  equipment;within precautions

## 2022-02-18 NOTE — ANESTHESIA POSTPROCEDURE EVALUATION
Patient: Holli Richardson    Procedure: Procedure(s):  HEMIARTHROPLASTY, HIP, BIPOLAR       Diagnosis:Closed fracture of left hip, initial encounter (H) [S72.002A]  Diagnosis Additional Information: No value filed.    Anesthesia Type:  Spinal    Note:  Disposition: Inpatient   Postop Pain Control: Uneventful            Sign Out: Well controlled pain   PONV: No   Neuro/Psych: Uneventful            Sign Out: Acceptable/Baseline neuro status   Airway/Respiratory: Uneventful            Sign Out: Acceptable/Baseline resp. status   CV/Hemodynamics: Uneventful            Sign Out: Acceptable CV status; No obvious hypovolemia; No obvious fluid overload   Other NRE: NONE   DID A NON-ROUTINE EVENT OCCUR? No           Last vitals:  Vitals Value Taken Time   /65 02/17/22 1645   Temp 36.5  C (97.7  F) 02/17/22 1630   Pulse 73 02/17/22 1656   Resp 14 02/17/22 1645   SpO2 99 % 02/17/22 1656   Vitals shown include unvalidated device data.    Electronically Signed By: Ramon Burt MD  February 17, 2022  7:10 PM

## 2022-02-18 NOTE — PLAN OF CARE
Goal Outcome Evaluation:    Plan of Care Reviewed With: patient, daughter     Overall Patient Progress: improving         Patient reports pain of 8/10 on her right hip and back. She also reports problem with sleeping and being emotional( crying) because of inability to perform Adls and increase pain. Oxycodone. Tylenol, ice, aromatherapy, repositioning and melatonin given. Patient has been sleeping between cares.

## 2022-02-18 NOTE — PROGRESS NOTES
[facility]    Medicine Progress Note - Hospitalist Service    Date of Admission:  2/16/2022     Assessment & Plan   SUMMARY:  86 year old female with history of hypertension, CKD, mild cognitive impairment who presented for evaluation after unwitnessed mechanical fall, found to have acute displaced left femoral neck fracture.  S/p left bipolar hemiarthroplasty on 2/16.    ACTIVE PROBLEM LIST  1.  Acute displaced left femoral neck fracture s/p bipolar hemiarthroplasty.  Pain management per orthopedic surgery.  PT/OT eval  2.  Acute urinary retention.  S/p Livingston catheter placement.  Consider voiding trial once patient is more mobile  3.  Chronic kidney disease stage 4: Creatinine at baseline.  Monitor renal function.  Avoid nephrotoxins  4.  Essential hypertension.  Continue diltiazem. Add prn hydralazine  5.  Hyperlipidemia on gemfibrozil  6.  GERD on PPI  7.  Metabolic acidosis secondary to CKD on bicarbonate twice daily  8.  Cognitive impairment.  At risk for delirium.  OT eval for slums.   9.  Anxiety: Seroquel 12.5 mg po bid       Barriers to Discharge: Pain control, mobility     Anticipated discharge date/Disposition: TCU in 3 days     Principal Problem:    Hip fracture, left, closed, initial encounter (H)  Active Problems:    Stage 4 chronic kidney disease (H)    Hyperlipidemia    Moderate major depression, single episode (H)    Fall, initial encounter    Acute urinary retention    DVT prophylaxis:    Medical:  subcutaneous enoxaparin    Mechanical:  PCD's    Diet: Orders Placed This Encounter      Advance Diet as Tolerated: Regular Diet Adult    Livingston Catheter: Present  Central Lines/Port-a-cath: Not present  Drains: Not present     --------------------------------------------    The patient's care was discussed with the Patient, Bedside Nurse and Care Coordinator/.    Spencer Hamilton MD  Hospitalist Service  [facility]  Securely message with the  "[employer] Web Console (learn more here)  Text page via [facility] Paging/Directory    Clinically Significant Risk Factors Present on Admission            ______________________________________________________________________    Interval History   Patient anxious, notes pain in left hip after walking with PT.     ROS: Denies chest pain, denies shortness of breath and passing urine well    Data personally reviewed from the last 24 hours:   Reviewed Consultant recommendations and medications     Physical Exam   BP (!) 179/75 (BP Location: Right arm)   Pulse 79   Temp 99.8  F (37.7  C) (Oral)   Resp 20   Ht 1.6 m (5' 3\")   Wt 48.4 kg (106 lb 11.2 oz)   SpO2 97%   BMI 18.90 kg/m      Physical Exam    General Appearance:    HEENT:  Awake, Alert, Cooperative, in no distress and appears stated age   Normocephalic, atraumatic, conjunctiva clear without icterus and ears without discharge   Lungs:   Clear to auscultation bilaterally, no wheezing, good air exchange, normal work of breathing   Cardiovascular:  Regular Rate and Rythm, normal apical impulse, normal S1 and S2, no lower extremity edema bilaterally   Abdomen: Soft, non-tender and Non-distended, active bowel sounds   Skin:  Skin color, texture normal and bruising or bleeding. No rashes or lesions over face, neck, arms and legs, turgor normal.   Neurologic:    Neuropsychiatric: Alert but confused, Facial symmetry preserved and upper & lower extremities moving well with symmetry  Calm, normal eye contact, Affect normal     Data   Recent Labs   Lab 02/18/22  0753 02/18/22  0638 02/17/22  1734 02/17/22  0633 02/16/22  1458 02/16/22  1106   WBC  --   --   --  9.1  --  12.7*   HGB  --  9.8*  --  10.7*  --  13.5   MCV  --   --   --  97  --  98   PLT  --   --   --  336  --  456*   INR  --   --   --   --  1.07  --    NA  --  140  --  138  --  141   POTASSIUM  --  4.5  --  4.2  --  4.3   CHLORIDE  --  110*  --  111*  --  111*   CO2  --  17*  --  17*  --  18*   BUN  --  33*  --  " 39*  --  32*   CR  --  1.74* 1.80* 1.96*  --  1.64*   ANIONGAP  --  13  --  10  --  12   GISELE  --  8.5  --  9.4  --  10.3   * 109  --  97  --  105   ALBUMIN  --   --   --  2.5*  --   --    PROTTOTAL  --   --   --  5.6*  --   --    BILITOTAL  --   --   --  0.2  --   --    ALKPHOS  --   --   --  108  --   --    ALT  --   --   --  12  --   --    AST  --   --   --  18  --   --      No results found for this or any previous visit (from the past 24 hour(s)).

## 2022-02-18 NOTE — PROGRESS NOTES
02/18/22 0900   Quick Adds   Type of Visit Initial PT Evaluation   Living Environment   People in Home alone   Current Living Arrangements assisted living   Home Accessibility no concerns   Self-Care   Equipment Currently Used at Home none   Fall history within last six months yes   Number of times patient has fallen within last six months 1   Activity/Exercise/Self-Care Comment Pt has walker available at home, uncertain whether FWW or 4WW.   Previous Level of Function/Home Environm   Bathing/Grooming, Premorbid Functional Level independent   Dressing, Premorbid Functional Level independent   Eating/Feeding, Premorbid Functional Level independent   Toileting, Premorbid Functional Level independent   Bed Mobility, Premorbid Functional Level independent   Transfers, Premorbid Functional Level independent   Household Ambulation, Premorbid Functional Level independent   General Information   Onset of Illness/Injury or Date of Surgery 02/16/22   Referring Physician Micah Marvin MD    Patient/Family Therapy Goals Statement (PT) None stated.   Pertinent History of Current Problem (include personal factors and/or comorbidities that impact the POC) Pt s/p bipolar hemiarthroplasty.   Existing Precautions/Restrictions no hip IR;90 degree hip flexion;no hip ADD past midline;fall   Weight-Bearing Status - LLE weight-bearing as tolerated   Pain Assessment   Patient Currently in Pain Yes, see Vital Sign flowsheet   Range of Motion (ROM)   Range of Motion ROM deficits secondary to pain;ROM deficits secondary to surgical procedure   Strength (Manual Muscle Testing)   Strength (Manual Muscle Testing) Deficits observed during functional mobility   Bed Mobility   Bed Mobility supine-sit   Supine-Sit [place] (Bed Mobility) moderate assist (50% patient effort);maximum assist (25% patient effort);2 person assist;verbal cues   Bed Mobility Limitations decreased ability to use legs for bridging/pushing   Impairments  Contributing to Impaired Bed Mobility pain;decreased strength   Assistive Device (Bed Mobility) bed rails;draw sheet   Transfers   Transfers sit-stand transfer;bed-chair transfer   Maintains Weight-bearing Status (Transfers) able to maintain   Transfer Safety Concerns Noted decreased weight-shifting ability;other (see comments)  (cues to maintain hip precautions)   Impairments Contributing to Impaired Transfers impaired balance;pain;decreased strength   Bed-Chair Transfer   Bed-Chair [place] (Transfers) moderate assist (50% patient effort);2 person assist;verbal cues;nonverbal cues (demo/gesture)   Assistive Device (Bed-Chair Transfers) other (see comments)  (arm-in-arm)   Sit-Stand Transfer   Sit-Stand [place] (Transfers) minimum assist (75% patient effort);moderate assist (50% patient effort);2 person assist;verbal cues   Assistive Device (Sit-Stand Transfers) walker, front-wheeled   Comment, (Sit-Stand Transfer) x2 reps with FWW, x1 rep with arm-in-arm.    Gait/Stairs (Locomotion)   [place] Level (Gait) minimum assist (75% patient effort);moderate assist (50% patient effort);verbal cues;2 person assist   Assistive Device (Gait) walker, front-wheeled;other (see comments)  (arm-in-arm)   Distance in Feet (Required for LE Total Joints) 3 steps   Pattern (Gait) step-to   Deviations/Abnormal Patterns (Gait) antalgic;aftab decreased;gait speed decreased   Clinical Impression   Criteria for Skilled Therapeutic Intervention Yes, treatment indicated   PT Diagnosis (PT) impaired functional mobility   Influenced by the following impairments decreased strength, pain, orthopedic restrictions, decreased balance   Functional limitations due to impairments difficulty with transfers, ambulation   Clinical Presentation (PT Evaluation Complexity) Evolving/Changing   Clinical Presentation Rationale Pt presents as medically diagnosed.   Clinical Decision Making (Complexity) moderate complexity   Planned Therapy  Interventions (PT) balance training;bed mobility training;gait training;home exercise program;neuromuscular re-education;patient/family education;strengthening;transfer training   Anticipated Equipment Needs at Discharge (PT) walker, rolling   Risk & Benefits of therapy have been explained evaluation/treatment results reviewed;participants voiced agreement with care plan;participants included;patient   PT Discharge Planning   PT Discharge Recommendation (DC Rec) Transitional Care Facility;home with assist;home with home care physical therapy   PT Rationale for DC Rec Pt currently requiring min to mod assist of 2 and arm-in-arm technique for transfers. Recommend TCU at discharge.   Plan of Care Review   Plan of Care Reviewed With patient   Total Evaluation Time   Total Evaluation Time (Minutes) 10   Physical Therapy Goals   PT Frequency 2x/day   PT Predicated Duration/Target Date for Goal Attainment 02/25/22   PT Goals Bed Mobility;Transfers;Gait;PT Goal 1   PT: Bed Mobility Minimal assist;Supine to/from sit;Within precautions   PT: Transfers Minimal assist;Sit to/from stand;Bed to/from chair;Assistive device   PT: Gait Minimal assist;Assistive device;Rolling walker;25 feet;Within precautions   PT: Goal 1 Pt will demonstrate independence with post-op hip HEP.     Melva Echavarria, PT  2/18/2022

## 2022-02-18 NOTE — PROGRESS NOTES
"Orthopedic Progress Note      Assessment: 1 Day Post-Op  S/P Procedure(s):  HEMIARTHROPLASTY, HIP, BIPOLAR     Plan:   - Continue PT/OT  - Weightbearing status: WBAT  - Anticoagulation: lovenox while inpatient, discharge on  mg daily if amblating okay in addition to SCDs, estee stockings and early ambulation.  - Discharge planning: pending pain control, medical clearance, TCU placement       Subjective:  Pain: moderate-severe, tolerating okay at rest worse with movement   Neuro:  Patient denies new onset numbness or paresthesias  Patient is rather quiet, but reports pain is constant and seems scared about this. I reassured her pain is appropriate post op and will improve and she will be up walking soon. She has started therapies, but not walked yet.     Objective:  /55   Pulse 77   Temp 99.8  F (37.7  C) (Oral)   Resp 20   Ht 1.6 m (5' 3\")   Wt 48.4 kg (106 lb 11.2 oz)   SpO2 97%   BMI 18.90 kg/m    The patient is up in chair, awake, alert, quiet,  Appears comfortable.   Sensation is intact.  Dorsiflexion and plantar flexion is intact.  Dorsalis pedis pulse intact.  Calves are soft and non-tender.   The incision is covered. Dressing C/D/I.      Pertinent Labs   Lab Results: personally reviewed.   Lab Results   Component Value Date    INR 1.07 02/16/2022     Lab Results   Component Value Date    WBC 9.1 02/17/2022    HGB 9.8 (L) 02/18/2022    HCT 33.4 (L) 02/17/2022    MCV 97 02/17/2022     02/17/2022     Lab Results   Component Value Date     02/18/2022    CO2 17 (L) 02/18/2022         Report completed by:  Angela Lopez PA-C, DOMI  Date: 2/18/2022  Time: 2:32 PM    "

## 2022-02-18 NOTE — PLAN OF CARE
Problem: Plan of Care - These are the overarching goals to be used throughout the patient stay.    Goal: Absence of Hospital-Acquired Illness or Injury  Intervention: Prevent Skin Injury  Recent Flowsheet Documentation  Taken 2/18/2022 0800 by Karlo Campoverde RN  Body Position: supine     Problem: Activity and Energy Impairment (Anxiety Signs/Symptoms)  Goal: Optimized Energy Level (Anxiety Signs/Symptoms)  Outcome: Ongoing, Progressing   Goal Outcome Evaluation:    Plan of Care Reviewed With: patient     Overall Patient Progress: improving    Patient up in chair with abductor wedge in place, anxious and crying, MD updated and Seroquel scheduled BID.   Seroquel 12.5 mg given which was effective. A 2 transfer with belt and walker.

## 2022-02-18 NOTE — PROGRESS NOTES
Spiritual Health Note    Spiritual Assessment:     visited patient due to patient request upon admission screening. Patient shared about medical condition and history, especially related to her fall and broken hip. She was tearful during the visit and stated that she has a hard time not crying. Offered normalization of these emotions considering her health challenge. She is experiencing emotional distress due to the illness and recovery time ahead.     Holli's daughter visited yesterday and is expected to visit again today to offer support.     Patient comes from [employer] [place] background and derives meaning, purpose, and comfort from jaja.     Care Provided:    Introduced self and role of Spiritual Care     Empathic listening and presence     Helped patient in processing of emotions     Collaborated with interdisciplinary staff     Offered prayer     Plan of Care: A  will continue to visit as able or per request by patient/family/staff.        Chaplain Rigoberto Dwyer MDiv, [facility]

## 2022-02-18 NOTE — PLAN OF CARE
Goal Outcome Evaluation:    Plan of Care Reviewed With: patient, daughter     Overall Patient Progress: improving      Problem: Plan of Care - These are the overarching goals to be used throughout the patient stay.    Goal: Absence of Hospital-Acquired Illness or Injury  Intervention: Identify and Manage Fall Risk  Recent Flowsheet Documentation  Taken 2/17/2022 1730 by Danitza Banegas, RN  Safety Promotion/Fall Prevention: bed alarm on  Taken 2/17/2022 0830 by Danitza Banegas RN  Safety Promotion/Fall Prevention: bed alarm on   Patient has been on bedrest so far this shift, spinal still intact. Has log rolled once this shift with assist.    Problem: Risk for Delirium  Goal: Optimal Coping  Outcome: Ongoing, Progressing  Is feeling sad since her daughter left the room to go home. Patient reports she is nervous about what's ahead, and is scared it will take a long time for her to get better.    Problem: Neurovascular Compromise (Orthopaedic Fracture)  Goal: Effective Tissue Perfusion  Outcome: Met    CMS is intact to lower extremities, but patient does report her feet are tingling. Left foot is slightly cooler than right, good pulses on both feet.     Problem: Pain (Orthopaedic Fracture)  Goal: Acceptable Pain Control  Outcome: Met  Intervention: Manage Acute Orthopaedic-Related Pain  Recent Flowsheet Documentation  Taken 2/17/2022 1730 by Danitza Banegas, RN  Pain Management Interventions:   cold applied   distraction   Patient has reported pain is not too bad, rates as 5/10. Tylenol started per order. Patient daughter reported patient has been sensitive to pain medications in the past with other surgery, narcotics made her very confused. Will monitor pain level, medicate as appropriate    Problem: Fluid and Electrolyte Imbalance (Hip Arthroplasty)  Goal: Fluid and Electrolyte Balance  Outcome: Ongoing, Progressing   Has now eaten 100% of regular diet. (soup, sandwich, ice cream, milk) doing well  with the diet.     Problem: Postoperative Urinary Retention (Hip Arthroplasty)  Goal: Effective Urinary Elimination  Outcome: Ongoing, Progressing   Living[place] catheter in place since prior to surgery.

## 2022-02-18 NOTE — PROGRESS NOTES
Hospitalist Progress Note    Assessment/Plan  86-year-old female with past medical history of hypertension, CKD, mild cognitive impairment who presented for evaluation after unwitnessed mechanical fall, found to have acute displaced left femoral neck fracture.  S/p left bipolar hemiarthroplasty    1.  Acute displaced left femoral neck fracture s/p bipolar hemiarthroplasty.  Pain management per orthopedic surgery.  PT/OT eval  2.  Acute urinary retention.  S/p Livingston catheter placement.  Consider voiding trial once patient is more mobile  3.  Chronic kidney disease.  Monitor renal function.  Avoid nephrotoxins  4.  Essential hypertension.  Continue diltiazem  5.  Hyperlipidemia on gemfibrozil  6.  GERD on PPI  7.  Metabolic acidosis secondary to CKD on bicarbonate twice daily  8.  Cognitive impairment.  At risk for delirium.  OT eval for slums      Barriers to Discharge: Pain control, mobility    Anticipated discharge date/Disposition: TCU in 3 days    Subjective  Patient reports having moderate pain.  No nausea, chest pain or shortness of breath    Objective    Vital signs in last 24 hours  Temp:  [96.9  F (36.1  C)-99.6  F (37.6  C)] 98.8  F (37.1  C)  Pulse:  [71-89] 86  Resp:  [12-20] 16  BP: (113-177)/(52-80) 117/58  SpO2:  [95 %-98 %] 97 % @LASTSAO2(12)@ O2 Device: None (Room air)    Weight:   Wt Readings from Last 3 Encounters:   02/16/22 48.4 kg (106 lb 11.2 oz)   09/28/21 51.8 kg (114 lb 3.2 oz)      Weight change:     Intake/Output last 3 shifts  I/O last 3 completed shifts:  In: 1573.33 [P.O.:240; I.V.:1333.33]  Out: 750 [Urine:750]  Body mass index is 18.9 kg/m .    Physical Exam    General Appearance:    Alert, cooperative, no distress, appears stated age   Lungs:     Clear bilaterally    Cardiovascular:    Regular rate ands rhythm.  Nornal S1, S2.  No murmur, rub or gallop.  No edema   Abdomen:     Soft, non-tender, bowel sounds active all four quadrants   Neurologic:   Awake, alert, knows she is at [facility] and that it is 2022 but cannot tell the month  Extremities: Good peripheral pulses, mild swelling around the left femur incision     Pertinent Labs   Lab Results: personally reviewed.   Recent Labs   Lab 02/17/22  0633 02/16/22  1106    141   CO2 17* 18*   BUN 39* 32*   ALBUMIN 2.5*  --    ALKPHOS 108  --    ALT 12  --    AST 18  --      Recent Labs   Lab 02/17/22  0633 02/16/22  1106   WBC 9.1 12.7*   HGB 10.7* 13.5   HCT 33.4* 42.4    456*     Recent Labs   Lab 02/16/22  1106   TROPONINI 0.18     Invalid input(s): POCGLUFGR    Medications  Current Facility-Administered Medications   Medication     acetaminophen (TYLENOL) tablet 975 mg     benzocaine-menthol (CEPACOL) 15-3.6 MG lozenge 1 lozenge     bisacodyl (DULCOLAX) Suppository 10 mg     buPROPion (WELLBUTRIN XL) 24 hr tablet 150 mg     ceFAZolin (ANCEF) 1 g vial to attach to  ml bag for ADULT or 50 ml bag for PEDS     diltiazem ER COATED BEADS (CARDIZEM CD/CARTIA XT) 24 hr capsule 120 mg     dorzolamide-timolol (COSOPT) ophthalmic solution 1 drop     [START ON 2/18/2022] enoxaparin ANTICOAGULANT (LOVENOX) injection 30 mg     gemfibrozil (LOPID) tablet 600 mg     HYDROmorphone (DILAUDID) injection 0.2 mg     lactated ringers infusion     latanoprost (XALATAN) 0.005 % ophthalmic solution 1 drop     lidocaine (LMX4) cream     lidocaine (LMX4) cream     lidocaine 1 % 0.1-1 mL     lidocaine 1 % 0.1-1 mL     melatonin tablet 1 mg     naloxone (NARCAN) injection 0.2 mg    Or     naloxone (NARCAN) injection 0.4 mg    Or     naloxone (NARCAN) injection 0.2 mg    Or     naloxone (NARCAN) injection 0.4 mg     ondansetron (ZOFRAN-ODT) ODT tab 4 mg    Or     ondansetron (ZOFRAN) injection 4 mg     oxyCODONE IR (ROXICODONE) half-tab 2.5 mg    Or     oxyCODONE (ROXICODONE) tablet 5 mg     pantoprazole (PROTONIX) EC tablet 40 mg     [START ON 2/18/2022] polyethylene glycol (MIRALAX) Packet 17 g     polyethylene glycol (MIRALAX) Packet 17 g      prochlorperazine (COMPAZINE) injection 5 mg    Or     prochlorperazine (COMPAZINE) tablet 5 mg     prochlorperazine (COMPAZINE) injection 5 mg    Or     prochlorperazine (COMPAZINE) tablet 5 mg    Or     prochlorperazine (COMPAZINE) suppository 12.5 mg     senna-docusate (SENOKOT-S/PERICOLACE) 8.6-50 MG per tablet 1 tablet     sodium bicarbonate tablet 650 mg     sodium chloride (PF) 0.9% PF flush 3 mL     sodium chloride (PF) 0.9% PF flush 3 mL     sodium chloride (PF) 0.9% PF flush 3 mL     sodium chloride (PF) 0.9% PF flush 3 mL     Vitamin D3 (CHOLECALCIFEROL) tablet 2,000 Units       Pertinent Radiology   Radiology Results: Personally reviewed   Results for orders placed or performed during the hospital encounter of 02/16/22   CT Head w/o Contrast    Impression    CONCLUSION:  HEAD CT:  1.  No acute intracranial abnormality.  2.  No acute calvarial fracture or scalp hematoma.  3.  Stable mild to moderate chronic age-related changes.    CERVICAL SPINE CT:  1.  No acute fracture or traumatic subluxation of the cervical spine by CT imaging.  2.  No high-grade spinal canal or neural foraminal stenosis.   Cervical spine CT w/o contrast    Impression    CONCLUSION:  HEAD CT:  1.  No acute intracranial abnormality.  2.  No acute calvarial fracture or scalp hematoma.  3.  Stable mild to moderate chronic age-related changes.    CERVICAL SPINE CT:  1.  No acute fracture or traumatic subluxation of the cervical spine by CT imaging.  2.  No high-grade spinal canal or neural foraminal stenosis.   CT Thoracic Spine w/o Contrast    Impression    IMPRESSION:  THORACIC SPINE CT:  1.  No fracture or posttraumatic subluxation.  2.  Multilevel thoracic spondylosis and prominent lower thoracic dextrocurvature which have progressed compared to the previous chest CT from 2019.  3.  No high-grade central spinal canal stenosis. Multilevel neural foraminal stenosis as above.  4.  Chronic posttraumatic deformities of multiple left  posterior lateral ribs. Large hiatal hernia.    LUMBAR SPINE CT:  1.  No fracture or posttraumatic subluxation.  2.  Multilevel lumbar spondylosis with associated multilevel spinal canal or neural foraminal stenosis as above.     Lumbar spine CT w/o contrast    Impression    IMPRESSION:  THORACIC SPINE CT:  1.  No fracture or posttraumatic subluxation.  2.  Multilevel thoracic spondylosis and prominent lower thoracic dextrocurvature which have progressed compared to the previous chest CT from 2019.  3.  No high-grade central spinal canal stenosis. Multilevel neural foraminal stenosis as above.  4.  Chronic posttraumatic deformities of multiple left posterior lateral ribs. Large hiatal hernia.    LUMBAR SPINE CT:  1.  No fracture or posttraumatic subluxation.  2.  Multilevel lumbar spondylosis with associated multilevel spinal canal or neural foraminal stenosis as above.     XR Pelvis 1/2 Views    Impression    IMPRESSION: Acute displaced left femoral neck fracture. No left hip dislocation. Mid to distal left femur appears intact. Left total knee arthroplasty with patellar resurfacing. Arterial calcification. Diffuse bone demineralization. Mild bilateral hip   osteoarthritis. No displaced pelvic fracture seen. Degenerative change at the symphysis pubis.    NOTE: ABNORMAL REPORT    THE DICTATION ABOVE DESCRIBES AN ABNORMALITY FOR WHICH FOLLOW-UP IS NEEDED.    XR Femur Left 2 Views    Impression    IMPRESSION: Acute displaced left femoral neck fracture. No left hip dislocation. Mid to distal left femur appears intact. Left total knee arthroplasty with patellar resurfacing. Arterial calcification. Diffuse bone demineralization. Mild bilateral hip   osteoarthritis. No displaced pelvic fracture seen. Degenerative change at the symphysis pubis.    NOTE: ABNORMAL REPORT    THE DICTATION ABOVE DESCRIBES AN ABNORMALITY FOR WHICH FOLLOW-UP IS NEEDED.    XR Pelvis and Hip Left 2 Views    Impression    IMPRESSION: Status post  recent left hip hemiarthroplasty. No immediate hardware complication. Expected postsurgical soft tissue edema and subcutaneous emphysema. No acute fracture or malalignment. Osteopenia.       Advanced Care Planning:  Discharge Planning discussed with patient, daughter, nursing staff        Analy Radford MD  Internal Medicine Hospitalist  2/17/2022

## 2022-02-18 NOTE — PROGRESS NOTES
Care Management Follow Up    Length of Stay (days): 2    Expected Discharge Date: 02/20/2022 or 2/21/22 (pending bed at TCU)    Concerns to be Addressed:  Postoperative care and recovery (Left cemented bipolar hemiarthroplasty  2/17/2022): Alteration in mobility, pain control.   Patient plan of care discussed at interdisciplinary rounds: Yes    Anticipated Discharge Disposition:  Transitional care (TCU) is recommended.      Anticipated Discharge Services:  Transitional care (TCU) is recommended for continued therapy and skilled nursing.   Anticipated Discharge DME:  Per therapy (if indicated).    Patient/family educated on Medicare website which has current facility and service quality ratings:  Yes  Education Provided on the Discharge Plan:  Per team  Patient/Family in Agreement with the Plan:  Yes    Referrals Placed by CM/SW:  See below.  Private pay costs discussed: Not applicable     Additional Information:  Patient lives at [facility] but just moved there on 1/28/22. She had moved from a senior Independent Living apartment. Her daughter Deepa provides assist with a standby assist with bathing. Her other daughter, Annia, helps with medication set up and calls her at least twice a day. Facility helps with housekeeping, laundry and some meals. For meals, she normally does her own meal for breakfast and goes to the restaurant at the facility for lunch. Her son helps her order from the menu at the facility for her dinner meal which is delivered to her room.  She had stopped using her walker in December 2021 but started using the walker again a few days ago for pain.  Transitional care (TCU) is recommended. . She has been at [facility] most recently and daughter Annia states, they would prefer that she not return there if possible. Awilda Jaimes stated that they would prefer [facility] if at all possible.   TCU choices:  1. [facility] (Can  likely accept on Monday 2/21)  2  [facility] (unable to accept admissions at this time) or   3. [facility] (has the referral and will follow up on Monday 21st: will likely have beds on 2/21 or 2/22 if needed)  Request for [employer] authorization submitted.    11:53 AM:  Received [employer] authorization number: [id number] good starting 2/21/2022 ending 3/2/2022 (would require updated information at that point).   [facility] transport at discharge or daughter will transport (if patient can get into the car).  1:24 PM:  Update provided to admissions at [facility].    Mari Power RN

## 2022-02-19 NOTE — PLAN OF CARE
Problem: Plan of Care - These are the overarching goals to be used throughout the patient stay.    Goal: Absence of Hospital-Acquired Illness or Injury  Intervention: Prevent Skin Injury  Recent Flowsheet Documentation  Taken 2/19/2022 0800 by Karlo Campoverde RN  Body Position: position maintained  Intervention: Prevent and Manage VTE (Venous Thromboembolism) Risk  Recent Flowsheet Documentation  Taken 2/19/2022 0800 by Karlo Campoverde, RN  Activity Management: activity adjusted per tolerance     Problem: Plan of Care - These are the overarching goals to be used throughout the patient stay.    Goal: Absence of Hospital-Acquired Illness or Injury  Intervention: Prevent and Manage VTE (Venous Thromboembolism) Risk  Recent Flowsheet Documentation  Taken 2/19/2022 0800 by Karlo Campoverde RN  Activity Management: activity adjusted per tolerance     Problem: Plan of Care - These are the overarching goals to be used throughout the patient stay.    Goal: Readiness for Transition of Care  Outcome: Ongoing, Progressing     Problem: Functional Ability Impaired (Hip Arthroplasty)  Goal: Optimal Functional Ability  Intervention: Promote Optimal Functional Status  Recent Flowsheet Documentation  Taken 2/19/2022 0800 by Karlo Campoverde, RN  Assistive Device Utilized:   walker   gait belt  Activity Management: activity adjusted per tolerance   Goal Outcome Evaluation:    Plan of Care Reviewed With: patient     Overall Patient Progress: improving    Outcome Evaluation: TCU recommended: 1) [facility] (possible bed Monday), 2) [facility] (full) or 3) [facility] (? Bed 2/21 or 2/22). [employer] auth received.    No verbal or nonverbal expressions of pain, Seroquel effective for patients' agitation, anxiety. No BX's noted this shift.

## 2022-02-19 NOTE — PROGRESS NOTES
"Orthopedic Progress Note      Assessment: 2 Day Post-Op  S/P Procedure(s):  HEMIARTHROPLASTY, HIP, BIPOLAR     Plan:   - Continue PT/OT  - Weightbearing status: WBAT  - Anticoagulation: lovenox while inpatient, discharge on  mg daily if amblating okay in addition to SCDs, estee stockings and early ambulation.  - Discharge planning: pending pain control, medical clearance, TCU placement       Subjective:  Pain: moderate-severe, tolerating okay at rest worse with movement   Neuro:  Patient denies new onset numbness or paresthesias  Patient is rather quiet, but reports pain is constant and seems scared about this. I reassured her pain is appropriate post op and will improve and she will be up walking soon. She has started therapies, but not walked yet.     Objective:  BP (!) 140/63 (BP Location: Left arm)   Pulse 72   Temp 98.4  F (36.9  C) (Axillary)   Resp 17   Ht 1.6 m (5' 3\")   Wt 48.4 kg (106 lb 11.2 oz)   SpO2 94%   BMI 18.90 kg/m    The patient is up in chair, awake, alert, quiet,  Appears comfortable.   Sensation is intact.  Dorsiflexion and plantar flexion is intact.  Dorsalis pedis pulse intact.  Calves are soft and non-tender.   The incision is covered. Dressing C/D/I.      Pertinent Labs   Lab Results: personally reviewed.   Lab Results   Component Value Date    INR 1.07 02/16/2022     Lab Results   Component Value Date    WBC 9.1 02/17/2022    HGB 8.2 (L) 02/19/2022    HCT 33.4 (L) 02/17/2022    MCV 97 02/17/2022     02/17/2022     Lab Results   Component Value Date     02/19/2022    CO2 20 (L) 02/19/2022         Terry Boucher MD, DO on 2/19/2022 at 7:44 AM      "

## 2022-02-19 NOTE — PROGRESS NOTES
"Care Management Follow Up    Length of Stay (days): 3    Expected Discharge Date: 02/21/2022 or 2/21/22 (pending bed at TCU)    Concerns to be Addressed:  Postoperative care and recovery (Left cemented bipolar hemiarthroplasty  2/17/2022): Alteration in mobility, pain control. Per ortho notes, \"patient reports pain is constant and seems scared about this. (MD) reassured her pain is appropriate post op and will improve and she will be up walking soon. She has started therapies, but not walked yet.\"   Patient plan of care discussed at interdisciplinary rounds: Yes    Anticipated Discharge Disposition:  Transitional care (TCU) is recommended.      Anticipated Discharge Services:  Transitional care (TCU) is recommended for continued therapy and skilled nursing.   Anticipated Discharge DME:  Per therapy (if indicated).    Patient/family educated on Medicare website which has current facility and service quality ratings:  Yes  Education Provided on the Discharge Plan:  Per team  Patient/Family in Agreement with the Plan:  Yes    Referrals Placed by CM/SW:  See below.  Private pay costs discussed: Not applicable     Additional Information:  Patient lives at [facility] but just moved there on 1/28/22. She had moved from a senior Independent Living apartment. Her daughter Deepa provides assist with a standby assist with bathing. Her other daughter, Annia, helps with medication set up and calls her at least twice a day. Facility helps with housekeeping, laundry and some meals. For meals, she normally does her own meal for breakfast and goes to the restaurant at the facility for lunch. Her son helps her order from the menu at the facility for her dinner meal which is delivered to her room.  She had stopped using her walker in December 2021 but started using the walker again a few days ago for pain.  Transitional care (TCU) is recommended. .She has been at [facility] most recently " and daughter Annia states, they would prefer that she not return there if possible. Awilda Jaimes stated that they would prefer [facility] if at all possible.   TCU choices:  1. [facility] (Can likely accept on Monday 2/21)  2  [facility] (unable to accept admissions at this time) or   3. [facility] (has the referral and will follow up on Monday 21st: will likely have beds on 2/21 or 2/22 if needed)  Request for [employer] authorization submitted.    Received [employer] authorization number: [id number] good starting 2/21/2022 ending 3/2/2022 (would require updated information at that point).    Health transport at discharge or daughter will transport (if patient can get into the car).    Mari Power RN

## 2022-02-19 NOTE — PROGRESS NOTES
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  S/p left bipolar hemiarthroplasty on 2/16.    Daughter closely involved in care, patient recently stayed w/daughter for a number of weeks after hospitalization, then patient moved to assisted living.  Has been taking buproprion for several months for anxiety and depression with some improvement. Has used seroquel in the past but finds it sedating.    ACTIVE PROBLEM LIST  1.  Acute displaced left femoral neck fracture s/p bipolar hemiarthroplasty: s/p surgery 2/16.  Pain management per orthopedic surgery.  PT/OT eval  2.  Acute urinary retention:  S/p Livingston catheter placement -remove Livingston and start voiding trial  3.  Chronic kidney disease stage 4: Creatinine at baseline varies between 1.7 and 2.2.  Monitor renal function.  Avoid nephrotoxins.  Creatinine rising last 3 days, if goes up again on Sunday 20/20/2022, would consider nephrology consult, has been followed by them in the past  4.  Essential hypertension:  Continue diltiazem. Add prn hydralazine  5.  Hyperlipidemia on gemfibrozil  6.  GERD on PPI  7.  Metabolic acidosis secondary to CKD on bicarbonate twice daily  8.  Cognitive impairment.  At risk for delirium.  OT eval for slums.   9.  Depression and anxiety: Trial of Seroquel 12.5 mg po bid, effective but patient overly sedated.  Reviewed patient's past history with Celexa, reviewed side effects and benefits with daughter and pharmacist.  We will continue bupropion at current dose as this is currently compatible with her CKD stage IV.  Will initiate Remeron 7.5 mg nightly and anticipate maximal  "beneficial effect to be seen in about 2 weeks.  10. Acute blood loss anemia: hgb = 8.8 today, recheck in AM. No active bleeding sites present      Barriers to Discharge: Pain control, mobility     Anticipated discharge date/Disposition: TCU Monday    DVT prophylaxis:    Medical:  subcutaneous enoxaparin    Mechanical:  PCD's    Diet: Orders Placed This Encounter      Advance Diet as Tolerated: Regular Diet Adult    Livingston Catheter: Present  Central Lines/Port-a-cath: Not present  Drains: Not present    Principal Problem:    Hip fracture, left, closed, initial encounter (H)  Active Problems:    Stage 4 chronic kidney disease (H)    Hyperlipidemia    Moderate major depression, single episode (H)    Fall, initial encounter    Acute urinary retention     --------------------------------------------    The patient's care was discussed with the Patient, Patient's Family, Bedside Nurse and Care Coordinator/.    Spencer Hamilton MD  Hospitalist Service  [facility]  Securely message with the Vocera Web Console (learn more here)  Text page via [employer] Paging/Directory    Clinically Significant Risk Factors Present on Admission            ______________________________________________________________________    Interval History   Patient feeling better today, less pain. Appetite improving. Sleepy after AM seroquel.     ROS: Denies chest pain, denies shortness of breath, Moving bowels well, passing urine well and slept well    Data personally reviewed from the last 24 hours:   Reviewed Laboratory results, Consultant recommendations and medications     Physical Exam   BP (P) 137/62 (BP Location: Left arm, Patient Position: Semi-Saldivar's)   Pulse (P) 70   Temp (P) 99.1  F (37.3  C) (Axillary)   Resp (P) 18   Ht 1.6 m (5' 3\")   Wt 48.4 kg (106 lb 11.2 oz)   SpO2 (P) 97%   BMI 18.90 kg/m      Physical Exam    General Appearance:    HEENT:  Awake, Alert, Cooperative, in no distress and appears " stated age   Normocephalic, atraumatic, conjunctiva clear without icterus and ears without discharge   Lungs:   Clear to auscultation bilaterally, no wheezing, good air exchange, normal work of breathing   Cardiovascular:  Regular Rate and Rythm, normal apical impulse, normal S1 and S2, no lower extremity edema bilaterally   Abdomen: Soft, non-tender and Non-distended, active bowel sounds   Skin:  Skin color, texture normal and bruising or bleeding. No rashes or lesions over face, neck, arms and legs, turgor normal.   Neurologic:    Neuropsychiatric: Alert, poor short term memory, Facial symmetry preserved and upper & lower extremities moving well with symmetry  Calm, normal eye contact, Affect normal     Data   Recent Labs   Lab 02/19/22  0701 02/18/22  0753 02/18/22  0638 02/17/22  1734 02/17/22  0633 02/16/22  1458 02/16/22  1106   WBC  --   --   --   --  9.1  --  12.7*   HGB 8.2*  --  9.8*  --  10.7*  --  13.5   MCV  --   --   --   --  97  --  98   PLT  --   --   --   --  336  --  456*   INR  --   --   --   --   --  1.07  --      --  140  --  138  --  141   POTASSIUM 4.3  --  4.5  --  4.2  --  4.3   CHLORIDE 111*  --  110*  --  111*  --  111*   CO2 20*  --  17*  --  17*  --  18*   BUN 32*  --  33*  --  39*  --  32*   CR 1.91*  --  1.74* 1.80* 1.96*  --  1.64*   ANIONGAP 7  --  13  --  10  --  12   GISELE 8.8  --  8.5  --  9.4  --  10.3    112* 109  --  97  --  105   ALBUMIN  --   --   --   --  2.5*  --   --    PROTTOTAL  --   --   --   --  5.6*  --   --    BILITOTAL  --   --   --   --  0.2  --   --    ALKPHOS  --   --   --   --  108  --   --    ALT  --   --   --   --  12  --   --    AST  --   --   --   --  18  --   --      No results found for this or any previous visit (from the past 24 hour(s)).

## 2022-02-19 NOTE — PLAN OF CARE
Problem: Pain (Orthopaedic Fracture)  Goal: Acceptable Pain Control  Intervention: Manage Acute Orthopaedic-Related Pain  Recent Flowsheet Documentation  Taken 2/19/2022 0000 by Janae Ross RN  Pain Management Interventions:   aromatherapy   cold applied   emotional support   essential oils   repositioned   Goal Outcome Evaluation:    Plan of Care Reviewed With: patient     Overall Patient Progress: improving    Outcome Evaluation: TCU recommended: 1) [facility] (possible bed Monday), 2) [facility] (full) or 3) [facility] (? Bed 2/21 or 2/22). [employer] auth received.      Patient denies pain but, reports problems sleeping and was crying beginning of the shift. Patient slept most of the night after melatonin. Patient reports pain is increases with movement, repositioned patient, ice applied to the hip, aroma therapy given and patient slept between cares.

## 2022-02-19 NOTE — PLAN OF CARE
Problem: Plan of Care - These are the overarching goals to be used throughout the patient stay.    Goal: Plan of Care Review/Shift Note  Description: The Plan of Care Review/Shift note should be completed every shift.  The Outcome Evaluation is a brief statement about your assessment that the patient is improving, declining, or no change.  This information will be displayed automatically on your shift note.  Outcome: Ongoing, Progressing     Problem: Plan of Care - These are the overarching goals to be used throughout the patient stay.    Goal: Absence of Hospital-Acquired Illness or Injury  Intervention: Identify and Manage Fall Risk  Recent Flowsheet Documentation  Taken 2/18/2022 1617 by Karlo Campoverde, RN  Safety Promotion/Fall Prevention: bed alarm on  Taken 2/18/2022 0800 by Karlo Campoverde, RN  Safety Promotion/Fall Prevention: bed alarm on     Problem: Plan of Care - These are the overarching goals to be used throughout the patient stay.    Goal: Absence of Hospital-Acquired Illness or Injury  Intervention: Prevent and Manage VTE (Venous Thromboembolism) Risk  Recent Flowsheet Documentation  Taken 2/18/2022 1617 by Karlo Campoverde, RN  Activity Management: activity adjusted per tolerance  Taken 2/18/2022 0800 by Karlo Campoverde, RN  Activity Management: activity adjusted per tolerance     Problem: Pain (Orthopaedic Fracture)  Goal: Acceptable Pain Control  2/18/2022 1818 by Karlo Campoverde, RN  Outcome: Ongoing, Not Progressing   Goal Outcome Evaluation:    Plan of Care Reviewed With: patient     Overall Patient Progress: improving    Outcome Evaluation: TCU recommended: 1) [facility] (possible bed Monday), 2) [facility] (full) or 3) [place] [facility] (? Bed 2/21 or 2/22). [employer] auth received.  Patient received PRN Oxycodone 2.5 mg this shift which was effective. Patient expresses loneliness, sadness, Seroquel scheduled BID.Dressing intact.

## 2022-02-20 NOTE — PROGRESS NOTES
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  S/p left bipolar hemiarthroplasty on 2/16.    1.  Acute displaced left femoral neck fracture s/p bipolar hemiarthroplasty: s/p surgery 2/16.  Pain management per orthopedic surgery.  PT/OT eval  2.  Acute urinary retention:  S/p Livingston catheter placement -remove Livingston and start voiding trial  3.  Chronic kidney disease stage 4: Creatinine at baseline varies between 1.7 and 2.2. Creatinine is stable at 1.68   4.  Essential hypertension:  Continue diltiazem. Add prn hydralazine  5.  Hyperlipidemia on gemfibrozil  6.  GERD on PPI  7.  Metabolic acidosis secondary to CKD on bicarbonate twice daily  8.  Cognitive impairment.  At risk for delirium.  OT eval for slums.   9.  Depression and anxiety:  Scheduled Seroquel was discontinued due to drowsiness. However patient has breakthrough anxiety and therefore will order Seroquel 12.5 mg twice daily as needed for anxiety. Daughter Annia thinks that her visit this afternoon helped allay some anxiety.    Previous hospitalist started Remeron 7.5 mg nightly and anticipate maximal beneficial effect to be seen in about 2 weeks.  10. Acute blood loss anemia: Hemoglobin is stable.  11. Constipation: ordered Lactulose 20 gm BID  12. Moderate protein calorie malnutrition--on scheduled Remeron.     Updated pt's daughter Awilda Jaimes and answered many questions.     Barriers to Discharge: Anxiety, pain and mobility     Anticipated discharge date/Disposition: TCU Monday      Subjective  Patient new to me. Chart reviewed. Patient is feeling uneasy and anxious. Seroquel was discontinued yesterday. Ordered as needed Seroquel. Tried to call patient's daughter but no answer. Awaiting TCU placement likely  "next a.m. Creatinine is improved to 1.68. Hemoglobin is stable.  Review of system is positive for constipation for 4 days.  Objective    BP (!) 148/67 (BP Location: Left arm)   Pulse 75   Temp 97.9  F (36.6  C) (Oral)   Resp 18   Ht 1.6 m (5' 3\")   Wt 48.4 kg (106 lb 11.2 oz)   SpO2 93%   BMI 18.90 kg/m    Weight:   Wt Readings from Last 5 Encounters:   02/16/22 48.4 kg (106 lb 11.2 oz)   09/28/21 51.8 kg (114 lb 3.2 oz)       I/O last 3 completed shifts:  In: 360 [P.O.:360]  Out: 1025 [Urine:1025]  No intake/output data recorded.          Physical Exam  Feeling anxious.  No pallor, icterus, clubbing, cyanosis  Body mass index is 18.9 kg/m .  Undernourished.  No sinus tenderness  Moist membranes  Neck supple  CVS: S1 S2-N, no murmurs, gallops, rubs  Resp: B/L vesicular breath sounds, no wheezing, crackles  Abd: Hypoactive bowel sounds.  Neuro: no involuntary movements such as tremors  Vasc: no leg edema     Pertinent Labs  ----------------------  Recent Labs   Lab 02/20/22  1044 02/20/22  0824 02/19/22  2118 02/19/22  1722 02/19/22  0701 02/18/22  0753 02/18/22  0638 02/17/22  1734 02/17/22  0633 02/16/22  1106     --   --   --  138  --  140  --  138 141   POTASSIUM 4.4  --   --   --  4.3  --  4.5  --  4.2 4.3   CO2 16*  --   --   --  20*  --  17*  --  17* 18*   BUN 30*  --   --   --  32*  --  33*  --  39* 32*   CR 1.68*  --   --   --  1.91*  --  1.74*   < > 1.96* 1.64*   MAG  --   --   --   --   --   --   --   --   --  2.0   * 99 148*   < > 106   < > 109  --  97 105   ALBUMIN  --   --   --   --   --   --   --   --  2.5*  --    BILITOTAL  --   --   --   --   --   --   --   --  0.2  --    ALKPHOS  --   --   --   --   --   --   --   --  108  --    ALT  --   --   --   --   --   --   --   --  12  --    AST  --   --   --   --   --   --   --   --  18  --     < > = values in this interval not displayed.     Recent Labs   Lab 02/20/22  0550 02/19/22  0701 02/18/22  0638 02/17/22  0633 02/16/22  1106 "   WBC  --   --   --  9.1 12.7*   HGB 8.4* 8.2* 9.8* 10.7* 13.5   HCT  --   --   --  33.4* 42.4     --   --  336 456*     Recent Labs   Lab 02/16/22  1458   INR 1.07     Glucose Values Latest Ref Rng & Units 2/7/2022 2/16/2022 2/17/2022 2/18/2022 2/19/2022 2/20/2022   Bedside Glucose (mg/dl )  - -- -- -- -- -- --   GLUCOSE 70 - 125 mg/dL 57(LL) 105 97 109 106 151(H)   Some recent data might be hidden         Pertinent Radiology   Radiology Results: Personally reviewed impression/s  XR Pelvis and Hip Left 2 Views    Result Date: 2/17/2022  EXAM: XR PELVIS AND HIP LEFT 2 VIEWS LOCATION: [facility] DATE/TIME: 2/17/2022 3:51 PM INDICATION: post op left hip hemiarthroplasty COMPARISON: 02/16/2022     IMPRESSION: Status post recent left hip hemiarthroplasty. No immediate hardware complication. Expected postsurgical soft tissue edema and subcutaneous emphysema. No acute fracture or malalignment. Osteopenia.    EKG Results: not reviewed.

## 2022-02-20 NOTE — PROGRESS NOTES
"Orthopedic Progress Note      Assessment: 3 Days Post-Op  S/P Procedure(s):  HEMIARTHROPLASTY, HIP, BIPOLAR    Plan:   - Continue PT/OT  - Weightbearing status: WBAT  - Anticoagulation: lovenox while inpatient, discharge on  mg daily if amblating okay in addition to SCDs, estee stockings and early ambulation.  - Discharge planning: pending pain control, medical clearance, TCU placement     Subjective:  No acute events.    Objective:  BP (!) 143/65 (BP Location: Left arm)   Pulse 69   Temp 98.3  F (36.8  C) (Oral)   Resp 20   Ht 1.6 m (5' 3\")   Wt 48.4 kg (106 lb 11.2 oz)   SpO2 94%   BMI 18.90 kg/m    NAD  Dressing CDI  Foot WWP    Report completed by:  Martir Jordan MD  Date: 2/20/2022  Time: 7:36 AM        "

## 2022-02-20 NOTE — PLAN OF CARE
Problem: Plan of Care - These are the overarching goals to be used throughout the patient stay.    Goal: Absence of Hospital-Acquired Illness or Injury  Intervention: Identify and Manage Fall Risk  Recent Flowsheet Documentation  Taken 2/20/2022 0800 by Karlo Campoverde RN  Safety Promotion/Fall Prevention: bed alarm on     Problem: Plan of Care - These are the overarching goals to be used throughout the patient stay.    Goal: Absence of Hospital-Acquired Illness or Injury  Intervention: Prevent and Manage VTE (Venous Thromboembolism) Risk  Recent Flowsheet Documentation  Taken 2/20/2022 0800 by Karlo Campoverde, RN  Activity Management: activity adjusted per tolerance     Problem: Embolism (Orthopaedic Fracture)  Goal: Absence of Embolism Signs and Symptoms  Outcome: Ongoing, Progressing     Problem: Functional Ability Impaired (Orthopaedic Fracture)  Goal: Optimal Functional Ability  Intervention: Optimize Functional Ability  Recent Flowsheet Documentation  Taken 2/20/2022 0800 by Karlo Campoverde, RN  Activity Management: activity adjusted per tolerance  Activity Assistance Provided: assistance, 2 people  Positioning/Transfer Devices: pillows   Goal Outcome Evaluation:    Plan of Care Reviewed With: patient     Overall Patient Progress: improving    Outcome Evaluation: TCU recommended: 1) [facility] (possible bed Monday), 2) [facility] (full) or 3) [place] [facility] (? Bed 2/21 or 2/22). [employer] auth received.    Patient anxious today, less than yesterday, up in chair, denies pain. No S/S of infection. Patient is an assist of1 with walker and transfer from bed to chair.

## 2022-02-20 NOTE — PROGRESS NOTES
"Care Management Follow Up    Length of Stay (days): 4    Expected Discharge Date: 02/21/2022 or 2/22/22 (pending bed at TCU)    Concerns to be Addressed:  Postoperative care and recovery (Left cemented bipolar hemiarthroplasty  2/17/2022): Alteration in mobility, pain control. Per ortho notes, \"patient reports pain is constant and seems scared about this. (MD) reassured her pain is appropriate post op and will improve and she will be up walking soon. She has started therapies, but not walked yet.\"   Patient plan of care discussed at interdisciplinary rounds: Yes    Anticipated Discharge Disposition:  Transitional care (TCU) is recommended.      Anticipated Discharge Services:  Transitional care (TCU) is recommended for continued therapy and skilled nursing.   Anticipated Discharge DME:  Per therapy (if indicated).    Patient/family educated on Medicare website which has current facility and service quality ratings:  Yes  Education Provided on the Discharge Plan:  Per team  Patient/Family in Agreement with the Plan:  Yes    Referrals Placed by CM/SW:  See below.  Private pay costs discussed: Not applicable     Additional Information:  Patient lives at [facility] but just moved there on 1/28/22. She had moved from a senior Independent Living apartment. Her daughter Deepa provides assist with a standby assist with bathing. Her other daughter, Annia, helps with medication set up and calls her at least twice a day. Facility helps with housekeeping, laundry and some meals. For meals, she normally does her own meal for breakfast and goes to the restaurant at the facility for lunch. Her son helps her order from the menu at the facility for her dinner meal which is delivered to her room.  She had stopped using her walker in December 2021 but started using the walker again a few days ago for pain.  Transitional care (TCU) is recommended. .She has been at [facility] most recently " and daughter Annia states, they would prefer that she not return there if possible. Awilda Jaimes stated that they would prefer [facility] if at all possible.   1:48 PM:  Received a call from admissions at [facility] who anticipates a possible bed tomorrow but they wanted to review patient's ability to participate in therapy. Call transferred to nurses station.   TCU choices:  1. [facility] (Can likely accept on Monday 2/21)  2  [facility] (unable to accept admissions at this time) or   3. [facility] (has the referral and will follow up on Monday 21st: will likely have beds on 2/21 or 2/22 if needed)  Request for [employer] authorization submitted.    Received [employer] authorization number: [id number] good starting 2/21/2022 ending 3/2/2022 (would require updated information at that point).   [facility] transport at discharge or daughter will transport (if patient can get into the car).    Mari Power RN

## 2022-02-20 NOTE — PLAN OF CARE
Problem: Plan of Care - These are the overarching goals to be used throughout the patient stay.    Goal: Absence of Hospital-Acquired Illness or Injury  Outcome: Ongoing, Progressing  Intervention: Identify and Manage Fall Risk  Recent Flowsheet Documentation  Taken 2/19/2022 1909 by Ángel Singh RN  Safety Promotion/Fall Prevention: bed alarm on  Taken 2/19/2022 1600 by Ángel Singh RN  Safety Promotion/Fall Prevention: bed alarm on  Intervention: Prevent Skin Injury  Recent Flowsheet Documentation  Taken 2/19/2022 1909 by Ángel Singh RN  Body Position: (sitting up by the edge of the bed) other (see comments)  Taken 2/19/2022 1600 by Ángel Singh RN  Body Position: (sitting up by the edge of the bed) other (see comments)  Intervention: Prevent and Manage VTE (Venous Thromboembolism) Risk  Recent Flowsheet Documentation  Taken 2/19/2022 1909 by Ángel Singh RN  Activity Management: activity adjusted per tolerance  Taken 2/19/2022 1600 by Ángel Singh RN  Activity Management: activity adjusted per tolerance     Problem: Plan of Care - These are the overarching goals to be used throughout the patient stay.    Goal: Absence of Hospital-Acquired Illness or Injury  Intervention: Prevent Skin Injury  Recent Flowsheet Documentation  Taken 2/19/2022 1909 by Ángel Singh RN  Body Position: (sitting up by the edge of the bed) other (see comments)  Taken 2/19/2022 1600 by Ángel Singh RN  Body Position: (sitting up by the edge of the bed) other (see comments)  A/o x 3. Chowdary managed with scheduled Tylenol, lavender oil massage and ice pack. Livingston d/nuha as ordered. Compliant with meds and cares.    Goal Outcome Evaluation:    Plan of Care Reviewed With: patient     Overall Patient Progress: improving    Outcome Evaluation: TCU recommended: 1) [facility] (possible bed Monday), 2) [facility] (full) or 3) [place] [facility] (? Bed 2/21 or 2/22). [employer] auth received.

## 2022-02-21 NOTE — PROGRESS NOTES
Care Management Discharge Note    Discharge Date: 02/21/2022      Discharge Disposition: Transitional Care    Discharge Services: Other (see comment) (therapy services )    Discharge DME: None    Discharge Transportation: other (see comments) (W)    Private pay costs discussed: transportation costs    PAS Confirmation Code:  [id number])    Patient/family educated on Medicare website which has current facility and service quality ratings: yes    Education Provided on the Discharge Plan:  Yes    Persons Notified of Discharge Plans: patient, pt's daughter Annia    Patient/Family in Agreement with the Plan: yes    Handoff Referral Completed: yes    Additional Information: SADA [facility] with Jac[facility] - pt accepted for admission today pending receipt of new negative Covid test.  Prefers ride after 12 PM today.  SADA set up ride for pt via [facility] at 2:00 PM today.    SADA met with pt and pt's daughter Annia in pt's room to review discharge plan.  Pt and Annia in agreement for pt to discharge to [facility] today via [facility].  SADA answered some questions from Annia and pt.  Annia also spoke with Dr Vasquez.      Charge, doctor, pt's nurse Vita, RAZ, and Jaclyn at facility all updated.  PAS done.      OBINNA Chávez, OUMOU 02/21/22 5:48 PM

## 2022-02-21 NOTE — PLAN OF CARE
Problem: Plan of Care - These are the overarching goals to be used throughout the patient stay.    Goal: Plan of Care Review/Shift Note  Description: The Plan of Care Review/Shift note should be completed every shift.  The Outcome Evaluation is a brief statement about your assessment that the patient is improving, declining, or no change.  This information will be displayed automatically on your shift note.  2/20/2022 1804 by Karlo Campoverde RN  Outcome: Ongoing, Progressing     Problem: Plan of Care - These are the overarching goals to be used throughout the patient stay.    Goal: Absence of Hospital-Acquired Illness or Injury  Outcome: Ongoing, Progressing  Intervention: Identify and Manage Fall Risk  Recent Flowsheet Documentation  Taken 2/20/2022 1600 by Karlo Campoverde RN  Safety Promotion/Fall Prevention: bed alarm on  Taken 2/20/2022 0800 by Karlo Campoverde RN  Safety Promotion/Fall Prevention: bed alarm on  Intervention: Prevent and Manage VTE (Venous Thromboembolism) Risk  Recent Flowsheet Documentation  Taken 2/20/2022 1600 by Karlo Campoverde RN  Activity Management: activity adjusted per tolerance  Taken 2/20/2022 0800 by Karlo Campoverde RN  Activity Management: activity adjusted per tolerance     Problem: Plan of Care - These are the overarching goals to be used throughout the patient stay.    Goal: Absence of Hospital-Acquired Illness or Injury  Intervention: Prevent and Manage VTE (Venous Thromboembolism) Risk  Recent Flowsheet Documentation  Taken 2/20/2022 1600 by Karlo Campoverde RN  Activity Management: activity adjusted per tolerance  Taken 2/20/2022 0800 by Karlo Campoverde RN  Activity Management: activity adjusted per tolerance     Problem: Activity and Energy Impairment (Anxiety Signs/Symptoms)  Goal: Optimized Energy Level (Anxiety Signs/Symptoms)  Outcome: Ongoing, Progressing   Goal Outcome Evaluation:  Patient received PRN Seroquel 12.5 mg for depression and anxiety which was  effective. Scheduled lactulose for constipation.  Plan of Care Reviewed With: patient     Overall Patient Progress: improving    Outcome Evaluation: TCU recommended: 1) [facility] (possible bed Monday), 2) [facility] (full) or 3) [place] [facility] (? Bed 2/21 or 2/22). [employer] auth received.

## 2022-02-21 NOTE — PROGRESS NOTES
Pt discharged to [facility] per/ [facility]. Packet sent with   Daughter here prior to transport.

## 2022-02-21 NOTE — PLAN OF CARE
Problem: Plan of Care - These are the overarching goals to be used throughout the patient stay.    Goal: Plan of Care Review/Shift Note  Description: The Plan of Care Review/Shift note should be completed every shift.  The Outcome Evaluation is a brief statement about your assessment that the patient is improving, declining, or no change.  This information will be displayed automatically on your shift note.  Outcome: Ongoing, Progressing   Explained to pt that she would be discharging to a TCU today. Pt cries and doesn't know why she is crying. Pt very anxious about everything. PRN serequel given.  Problem: Functional Ability Impaired (Orthopaedic Fracture)  Goal: Optimal Functional Ability  Outcome: Ongoing, Progressing   Goal Outcome Evaluation:  Pt continues with PT/OT. Pt up with walker.  Plan of Care Reviewed With: patient     Overall Patient Progress: improving    Outcome Evaluation: TCU recommended: 1) [facility] (possible bed Monday), 2) [facility] (full) or 3) [place] [facility] (? Bed 2/21 or 2/22). Saman jewell received.

## 2022-02-21 NOTE — DISCHARGE SUMMARY
[facility] MEDICINE  DISCHARGE SUMMARY     Primary Care Physician: Everardo English  Admission Date: 2/16/2022   Discharge Provider: Charles Vasquez MD Discharge Date: 2/21/2022   Diet: As given below   Code Status: Full Code   Activity: As given below        Condition at Discharge: Stable     REASON FOR PRESENTATION(See Admission Note for Details)   Unwitnessed mechanical fall    PRINCIPAL & ACTIVE DISCHARGE DIAGNOSES   Acute left femoral neck fracture s/p bipolar hemiarthroplasty on 2/16  Acute urinary retention resolved  CKD stage IV  Essential hypertension  Metabolic acidosis  Generalized anxiety disorder and depression with panic attack  Acute blood loss anemia  Constipation      SIGNIFICANT FINDINGS (Imaging, labs):   XR Pelvis and Hip Left 2 Views    Result Date: 2/17/2022  EXAM: XR PELVIS AND HIP LEFT 2 VIEWS LOCATION: [facility] DATE/TIME: 2/17/2022 3:51 PM INDICATION: post op left hip hemiarthroplasty COMPARISON: 02/16/2022     IMPRESSION: Status post recent left hip hemiarthroplasty. No immediate hardware complication. Expected postsurgical soft tissue edema and subcutaneous emphysema. No acute fracture or malalignment. Osteopenia.    XR Femur Left 2 Views    Result Date: 2/16/2022  EXAM: XR PELVIS 1/2 VW, XR FEMUR LEFT 2 VIEW LOCATION: [facility] DATE/TIME: 2/16/2022 11:58 AM INDICATION: Left hip pain. COMPARISON: 2/10/2022     IMPRESSION: Acute displaced left femoral neck fracture. No left hip dislocation. Mid to distal left femur appears intact. Left total knee arthroplasty with patellar resurfacing. Arterial calcification. Diffuse bone demineralization. Mild bilateral hip osteoarthritis. No displaced pelvic fracture seen. Degenerative change at the symphysis pubis. NOTE: ABNORMAL REPORT THE DICTATION ABOVE DESCRIBES AN ABNORMALITY FOR WHICH FOLLOW-UP IS NEEDED.     XR Pelvis 1/2 Views    Result Date:  2/16/2022  EXAM: XR PELVIS 1/2 VW, XR FEMUR LEFT 2 VIEW LOCATION: [facility] DATE/TIME: 2/16/2022 11:58 AM INDICATION: Left hip pain. COMPARISON: 2/10/2022     IMPRESSION: Acute displaced left femoral neck fracture. No left hip dislocation. Mid to distal left femur appears intact. Left total knee arthroplasty with patellar resurfacing. Arterial calcification. Diffuse bone demineralization. Mild bilateral hip osteoarthritis. No displaced pelvic fracture seen. Degenerative change at the symphysis pubis. NOTE: ABNORMAL REPORT THE DICTATION ABOVE DESCRIBES AN ABNORMALITY FOR WHICH FOLLOW-UP IS NEEDED.     Cervical spine CT w/o contrast    Result Date: 2/16/2022  [facility] CT HEAD W/O CONTRAST, CT CERVICAL SPINE W/O CONTRAST 2/16/2022 11:42 AM INDICATION: Traumatic head and neck injury. Unwitnessed fall, hit head on stool on the way down, amnestic to event. TECHNIQUE: HEAD CT: Head CT without IV contrast. Dose reduction techniques were used. CERVICAL SPINE CT: Helical images were obtained through the cervical spine and were evaluated in the axial plane with sagittal and coronal reformations. Dose reduction techniques were used. SEDATION: None. COMPARISON: Head CT 09/22/2021 FINDINGS: HEAD CT: No intracranial hemorrhage, extraaxial collection, mass effect, or CT evidence of acute infarct.  Mild presumed chronic small vessel ischemic changes. Mild to moderate generalized volume loss. The ventricles are proportional to the sulci. No acute calvarial fracture or scalp hematoma. The visualized orbits, paranasal sinuses, and mastoid air cells are free of significant disease. CERVICAL SPINE CT: Diffuse bone demineralization. Normal vertebral body heights and alignment. No evidence for acute fracture or posttraumatic subluxation. Ankylosis of the bilateral C3-C4 facet joints. No high-grade spinal canal or neural foraminal stenosis. Mild-to-moderate atherosclerotic calcification  at the carotid bifurcations. Otherwise grossly normal paraspinal soft tissues. Normal lung apices.     CONCLUSION: HEAD CT: 1.  No acute intracranial abnormality. 2.  No acute calvarial fracture or scalp hematoma. 3.  Stable mild to moderate chronic age-related changes. CERVICAL SPINE CT: 1.  No acute fracture or traumatic subluxation of the cervical spine by CT imaging. 2.  No high-grade spinal canal or neural foraminal stenosis.    CT Head w/o Contrast    Result Date: 2/16/2022  [facility] CT HEAD W/O CONTRAST, CT CERVICAL SPINE W/O CONTRAST 2/16/2022 11:42 AM INDICATION: Traumatic head and neck injury. Unwitnessed fall, hit head on stool on the way down, amnestic to event. TECHNIQUE: HEAD CT: Head CT without IV contrast. Dose reduction techniques were used. CERVICAL SPINE CT: Helical images were obtained through the cervical spine and were evaluated in the axial plane with sagittal and coronal reformations. Dose reduction techniques were used. SEDATION: None. COMPARISON: Head CT 09/22/2021 FINDINGS: HEAD CT: No intracranial hemorrhage, extraaxial collection, mass effect, or CT evidence of acute infarct.  Mild presumed chronic small vessel ischemic changes. Mild to moderate generalized volume loss. The ventricles are proportional to the sulci. No acute calvarial fracture or scalp hematoma. The visualized orbits, paranasal sinuses, and mastoid air cells are free of significant disease. CERVICAL SPINE CT: Diffuse bone demineralization. Normal vertebral body heights and alignment. No evidence for acute fracture or posttraumatic subluxation. Ankylosis of the bilateral C3-C4 facet joints. No high-grade spinal canal or neural foraminal stenosis. Mild-to-moderate atherosclerotic calcification at the carotid bifurcations. Otherwise grossly normal paraspinal soft tissues. Normal lung apices.     CONCLUSION: HEAD CT: 1.  No acute intracranial abnormality. 2.  No acute calvarial fracture or scalp  hematoma. 3.  Stable mild to moderate chronic age-related changes. CERVICAL SPINE CT: 1.  No acute fracture or traumatic subluxation of the cervical spine by CT imaging. 2.  No high-grade spinal canal or neural foraminal stenosis.    Lumbar spine CT w/o contrast    Result Date: 2/16/2022  EXAM: CT THORACIC SPINE W/O CONTRAST, CT LUMBAR SPINE W/O CONTRAST LOCATION: [facility] DATE/TIME: 2/16/2022 11:42 AM INDICATION: Trauma. Unwitnessed fall. Back and left groin pain. COMPARISON: CT chest 07/08/2019 and CT abdomen pelvis 11/08/2021. TECHNIQUE: 1) Routine CT Thoracic Spine without IV contrast. Multiplanar reformats. Dose reduction techniques were used. 2) Routine CT Lumbar Spine without IV contrast. Multiplanar reformats. Dose reduction techniques were used. FINDINGS: THORACIC SPINE CT: VERTEBRA: 12 rib bearing thoracic type vertebra. Approximately 35 degrees lower thoracic dextrocurvature, both increased compared to the 2019 exam. Accentuated thoracic kyphosis with relatively unchanged vertebral body heights. No fracture or posttraumatic subluxation. CANAL/FORAMINA: Multilevel thoracic spondylosis with mild to moderate loss of disc height and associated endplate spurring at multiple mid and lower thoracic levels. Associated multilevel facet arthropathy. No CT evidence for high-grade central spinal canal stenosis. Mild to moderate neural foraminal stenosis from T8 through T11, left greater than right. PARASPINAL: Multiple chronic fracture deformities of posterior lateral left ribs. Large hiatal hernia. Chronic appearing atelectasis of the left lung base. LUMBAR SPINE CT: VERTEBRA: 5 lumbar type vertebra. Accentuated lumbar lordosis. Approximately 34 degrees lumbar levocurvature. 2 mm retrolisthesis at multiple levels from L1 through L4. 6 mm anterolisthesis at L5-S1. No fracture or posttraumatic subluxation. CANAL/FORAMINA: Diffuse lumbar spondylosis with mild to moderate loss of disc height  and associated endplate spurring greatest along the concavity of the spinal curvature on the right at L2-L3 and L3-L4. Mild to moderate multilevel lumbar facet arthropathy greatest at L4-L5 and L5-S1. Mild spinal canal stenosis at L2-L3, L3-L4, and L4-L5. Moderate to severe left neural foraminal stenosis at L3-L4 with moderate right neural foraminal stenosis at this level. Mild to moderate bilateral neural foraminal stenosis at L4-L5 and L5-S1. PARASPINAL: Paraspinal muscular atrophy. Atherosclerotic changes of the aortoiliac vessels.     IMPRESSION: THORACIC SPINE CT: 1.  No fracture or posttraumatic subluxation. 2.  Multilevel thoracic spondylosis and prominent lower thoracic dextrocurvature which have progressed compared to the previous chest CT from 2019. 3.  No high-grade central spinal canal stenosis. Multilevel neural foraminal stenosis as above. 4.  Chronic posttraumatic deformities of multiple left posterior lateral ribs. Large hiatal hernia. LUMBAR SPINE CT: 1.  No fracture or posttraumatic subluxation. 2.  Multilevel lumbar spondylosis with associated multilevel spinal canal or neural foraminal stenosis as above.     CT Thoracic Spine w/o Contrast    Result Date: 2/16/2022  EXAM: CT THORACIC SPINE W/O CONTRAST, CT LUMBAR SPINE W/O CONTRAST LOCATION: [facility] DATE/TIME: 2/16/2022 11:42 AM INDICATION: Trauma. Unwitnessed fall. Back and left groin pain. COMPARISON: CT chest 07/08/2019 and CT abdomen pelvis 11/08/2021. TECHNIQUE: 1) Routine CT Thoracic Spine without IV contrast. Multiplanar reformats. Dose reduction techniques were used. 2) Routine CT Lumbar Spine without IV contrast. Multiplanar reformats. Dose reduction techniques were used. FINDINGS: THORACIC SPINE CT: VERTEBRA: 12 rib bearing thoracic type vertebra. Approximately 35 degrees lower thoracic dextrocurvature, both increased compared to the 2019 exam. Accentuated thoracic kyphosis with relatively unchanged vertebral  body heights. No fracture or posttraumatic subluxation. CANAL/FORAMINA: Multilevel thoracic spondylosis with mild to moderate loss of disc height and associated endplate spurring at multiple mid and lower thoracic levels. Associated multilevel facet arthropathy. No CT evidence for high-grade central spinal canal stenosis. Mild to moderate neural foraminal stenosis from T8 through T11, left greater than right. PARASPINAL: Multiple chronic fracture deformities of posterior lateral left ribs. Large hiatal hernia. Chronic appearing atelectasis of the left lung base. LUMBAR SPINE CT: VERTEBRA: 5 lumbar type vertebra. Accentuated lumbar lordosis. Approximately 34 degrees lumbar levocurvature. 2 mm retrolisthesis at multiple levels from L1 through L4. 6 mm anterolisthesis at L5-S1. No fracture or posttraumatic subluxation. CANAL/FORAMINA: Diffuse lumbar spondylosis with mild to moderate loss of disc height and associated endplate spurring greatest along the concavity of the spinal curvature on the right at L2-L3 and L3-L4. Mild to moderate multilevel lumbar facet arthropathy greatest at L4-L5 and L5-S1. Mild spinal canal stenosis at L2-L3, L3-L4, and L4-L5. Moderate to severe left neural foraminal stenosis at L3-L4 with moderate right neural foraminal stenosis at this level. Mild to moderate bilateral neural foraminal stenosis at L4-L5 and L5-S1. PARASPINAL: Paraspinal muscular atrophy. Atherosclerotic changes of the aortoiliac vessels.     IMPRESSION: THORACIC SPINE CT: 1.  No fracture or posttraumatic subluxation. 2.  Multilevel thoracic spondylosis and prominent lower thoracic dextrocurvature which have progressed compared to the previous chest CT from 2019. 3.  No high-grade central spinal canal stenosis. Multilevel neural foraminal stenosis as above. 4.  Chronic posttraumatic deformities of multiple left posterior lateral ribs. Large hiatal hernia. LUMBAR SPINE CT: 1.  No fracture or posttraumatic subluxation. 2.   Multilevel lumbar spondylosis with associated multilevel spinal canal or neural foraminal stenosis as above.       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  S/p left bipolar hemiarthroplasty on 2/16.     1.  Acute displaced left femoral neck fracture s/p bipolar hemiarthroplasty: s/p surgery 2/16.  Pain management per orthopedic surgery.  PT/OT eval  2.  Acute urinary retention:  S/p Livingston catheter placement -remove Livingston and start voiding trial  3.  Chronic kidney disease stage 4: Creatinine at baseline varies between 1.7 and 2.2. Creatinine is stable at 1.68   4.  Essential hypertension:  Continue diltiazem. Add prn hydralazine  5.  Hyperlipidemia on gemfibrozil  6.  GERD on PPI  7.  Metabolic acidosis secondary to CKD on bicarbonate twice daily  8.  Cognitive impairment.  At risk for delirium.  OT eval for slums.   9.  Depression and anxiety:  Scheduled Seroquel was discontinued due to drowsiness. However patient has breakthrough anxiety and therefore will order Seroquel 12.5 mg twice daily as needed for anxiety. Daughter Annia thinks that her visit this afternoon helped allay some anxiety.    Previous hospitalist started Remeron 7.5 mg nightly and anticipate maximal beneficial effect to be seen in about 2 weeks.  10. Acute blood loss anemia: Hemoglobin is stable.  11. Constipation: ordered Lactulose 20 gm BID  12. Moderate protein calorie malnutrition--on scheduled Remeron\  Pt has had poor appetite and shows muscle  wasting      Discharge Medications with Med changes:        Review of your medicines      START taking      Dose / Directions   aspirin 81 MG EC tablet  Commonly known as: ASA      Dose: 81 mg  Take 1 tablet (81 mg) by mouth two times daily for 28 days  Quantity: 56 tablet  Refills: 0     bisacodyl 10 MG suppository  Commonly known as: DULCOLAX  Used for: Constipation, unspecified constipation type      Dose: 10 mg  Place 1 suppository (10 mg) rectally daily as needed for constipation (Use if Magnesium hydroxide (MILK of MAGNESIA) not effective after 24 hours. May discontinue if patient having bowel movement.)  Refills: 0     lactulose 20 GM packet  Commonly known as: CEPHULAC  Used for: Constipation, unspecified constipation type      Dose: 20 g  Take 1 packet (20 g) by mouth 2 times daily for 2 days Hold if more than 2 bowel movements a day.  Quantity: 4 packet  Refills: 0     mirtazapine 7.5 MG tablet  Commonly known as: REMERON  Used for: Anxiety      Dose: 7.5 mg  Take 1 tablet (7.5 mg) by mouth At Bedtime  Refills: 0     oxyCODONE 5 MG tablet  Commonly known as: ROXICODONE      Dose: 2.5-5 mg  Take 0.5-1 tablets (2.5-5 mg) by mouth every 4 hours as needed for severe pain ((pain rating 7-10)) 0.5 tablet for pain 4-6, 1 tablet pain 7-10  Quantity: 24 tablet  Refills: 0     QUEtiapine 25 MG tablet  Commonly known as: SEROquel  Used for: Anxiety      Dose: 12.5 mg  Take 0.5 tablets (12.5 mg) by mouth 2 times daily as needed (anxiety)  Refills: 0     senna-docusate 8.6-50 MG tablet  Commonly known as: SENOKOT-S/PERICOLACE      Dose: 1 tablet  Take 1 tablet by mouth 2 times daily  Refills: 0        CONTINUE these medicines which may have CHANGED, or have new prescriptions. If we are uncertain of the size of tablets/capsules you have at home, strength may be listed as something that might have changed.      Dose / Directions   acetaminophen 325 MG tablet  Commonly known as: TYLENOL  This may have changed:      medication strength    how much to take    when to take this      Dose: 975 mg  Take 3 tablets (975 mg) by mouth 3 times daily  Refills: 0        CONTINUE these medicines which have NOT CHANGED      Dose / Directions   buPROPion 150 MG 24 hr tablet  Commonly known as: WELLBUTRIN XL      Dose: 150 mg  Take 150 mg by mouth daily  Refills: 0     cholecalciferol 50 MCG (2000 UT) tablet      Dose: 2,000 Units  Take 2,000 Units by mouth daily  Refills: 0     diltiazem ER COATED BEADS 120 MG 24 hr capsule  Commonly known as: CARDIZEM CD/CARTIA XT      Dose: 120 mg  Take 120 mg by mouth daily  Refills: 0     dorzolamide-timolol PF 22.3-6.8 MG/ML opthalmic solution  Commonly known as: COSOPT      Dose: 1 drop  Place 1 drop Into the left eye 2 times daily  Refills: 0     gemfibrozil 600 MG tablet  Commonly known as: LOPID      Dose: 600 mg  [GEMFIBROZIL (LOPID) 600 MG TABLET] Take 600 mg by mouth 2 (two) times a day before meals.  Refills: 0     latanoprost 0.005 % ophthalmic solution  Commonly known as: XALATAN      Dose: 1 drop  Place 1 drop into both eyes At Bedtime  Refills: 0     melatonin 3 MG tablet  Used for: Moderate major depression, single episode (H)      Dose: 3 mg  Take 1 tablet (3 mg) by mouth At Bedtime  Quantity: 30 tablet  Refills: 0     multivitamin  with lutein Caps per capsule      Dose: 1 capsule  Take 1 capsule by mouth daily  Refills: 0     omeprazole 20 MG DR capsule  Commonly known as: priLOSEC      Dose: 20 mg  Take 20 mg by mouth daily  Refills: 0     PROBIOTIC-10 PO      Dose: 1 tablet  Take 1 tablet by mouth daily  Refills: 0     sodium bicarbonate 650 MG tablet  Used for: Stage 4 chronic kidney disease (H)      Dose: 650 mg  Take 1 tablet (650 mg) by mouth 2 times daily  Quantity: 60 tablet  Refills: 0           Where to get your medicines      These medications were sent to [id number] - [place], [place] - [address]  [address], [place] [address]     "Hours: test fax successfully sent 10/22/03 kr Phone: [phone]     aspirin 81 MG EC tablet     Some of these will need a paper prescription and others can be bought over the counter. Ask your nurse if you have questions.    Bring a paper prescription for each of these medications    oxyCODONE 5 MG tablet             Rationale for medication changes:      Seroquel as needed for anxiety  Remeron for anxiety at bedtime  Lactulose for constipation  Aspirin for DVT prophylaxis per orthopedics      Consults   Orthopedics      Immunizations given this encounter     Immunization History   Administered Date(s) Administered     COVID-19,PF,Moderna 02/25/2021, 03/25/2021          Anticoagulation Information      Recent INR results:   Recent Labs   Lab Test 02/16/22  1458   INR 1.07     Warfarin doses (if applicable) or name of other anticoagulant: Aspirin      Discharge Orders     Discharge Procedure Orders   Reason for your hospital stay   Order Comments: Left hip fracture and partial hip replacement     When to call - Contact Surgeon Team   Order Comments: You may experience symptoms that require follow-up before your scheduled appointment. Refer to the \"[employer]\" for instructions on when to contact your Surgeon Team if you are concerned about pain control, blood clots, constipation, or if you are unable to urinate.     When to call - Reach out to Urgent Care   Order Comments: If you are not able to reach your Surgeon Team and you need immediate care, go to the Orthopedic Walk-in Clinic or Urgent Care at your Surgeon's office.  Do NOT go to the Emergency Room unless you have shortness of breath, chest pain, or other signs of a medical emergency.     When to call - Reasons to Call 911   Order Comments: Call 911 immediately if you experience sudden-onset chest pain, arm weakness/numbness, slurred speech, or shortness of breath     Symptoms - Fever Management   Order Comments: A low grade fever can be expected after " surgery.  Use acetaminophen (TYLENOL) as needed for fever management.  Contact your Surgeon Team if you have a fever greater than 101.5 F, chills, and/or night sweats.     Symptoms - Constipation management   Order Comments: Constipation (hard, dry bowel movements) is expected after surgery due to the combination of being less active, the anesthetic, and the opioid pain medication.  You can do the following to help reduce constipation:  ~  FLUIDS:  Drink clear liquids (water or Gatorade), or juice (apple/prune).  ~  DIET:  Eat a fiber rich diet.    ~  ACTIVITY:  Get up and move around several times a day.  Increase your activity as you are able.  MEDICATIONS:  Reduce the risk of constipation by starting medications before you are constipated.  You can take Miralax   (1 packet as directed) and/or a stool softener (Senokot 1-2 tablets 1-2 times a day).  If you already have constipation and these medications are not working, you can get magnesium citrate and use as directed.  If you continue to have constipation you can try an over the counter suppository or enema.  Call your Surgeon Team if it has been greater than 3 days since your last bowel movement.     Symptoms - Reduced Urine Output   Order Comments: Changes in the amount of fluids you drank before and after surgery may result in problems urinating.  It is important to stay well-hydrated after surgery and drink plenty of water. If it has been greater than 8 hours since you have urinated despite drinking plenty of water, call your Surgeon Team.     Activity - Exercises to prevent blood clots   Order Comments: Unless otherwise directed by your Surgeon team, perform the following exercises at least three times per day for the first four weeks after surgery to prevent blood clots in your legs: 1) Point and flex your feet (Ankle Pumps), 2) Move your ankle around in big circles, 3) Wiggle your toes, 4) Walk, even for short distances, several times a day, will help  decrease the risk of blood clots.     Order Specific Question Answer Comments   Is discharge order? Yes      Comfort and Pain Management - Pain after Surgery   Order Comments: Pain after surgery is normal and expected.  You will have some amount of pain for several weeks after surgery.  Your pain will improve with time.  There are several things you can do to help reduce your pain including: rest, ice, elevation, and using pain medications as needed. Contact your Surgeon Team if you have pain that persists or worsens after surgery despite rest, ice, elevation, and taking your medication(s) as prescribed. Contact your Surgeon Team if you have new numbness, tingling, or weakness in your operative extremity.     Comfort and Pain Management - Swelling after Surgery   Order Comments: Swelling and/or bruising of the surgical extremity is common and may persist for several months after surgery. In addition to frequent icing and elevation, gentle compressive support with an ACE wrap or tubigrip may help with swelling. Apply compression regularly, removing at least twice daily to perform skin checks. Contact your Surgeon Team if your swelling increases and is NOT associated with an increase in your activity level, or if your swelling increases and is associated with redness and pain.     Comfort and Pain Management - Cold therapy   Order Comments: Ice can be used to control swelling and discomfort after surgery. Place a thin towel over your operative site and apply the ice pack overtop. Leave ice pack in place for 20 minutes, then remove for 20 minutes. Repeat this 20 minutes on/20 minutes off routine as often as tolerated.     Medication Instructions - Acetaminophen (TYLENOL) Instructions   Order Comments: You were discharged with acetaminophen (TYLENOL) for pain management after surgery. Acetaminophen most effectively manages pain symptoms when it is taken on a schedule without missing doses (every four, six, or eight  hours). Your Provider will prescribe a safe daily dose between 3000 - 4000 mg.  Do NOT exceed this daily dose. Most patients use acetaminophen for pain control for the first four weeks after surgery.  You can wean from this medication as your pain decreases.     Medication Instructions - Opioids - Tapering Instructions   Order Comments: In the first three days following surgery, your symptoms may warrant use of the narcotic pain medication every four to six hours as prescribed. This is normal. As your pain symptoms improve, focus your efforts on decreasing (tapering) use of narcotic medications. The most successful tapering strategy is to first, decrease the number of tablets you take every 4-6 hours to the minimum prescribed. Then, increase the amount of time between doses.  For example:  First, taper to   or 1 tablet every 4-6 hours.  Then, taper to   or 1 tablet every 6-8 hours.  Then, taper to   or 1 tablet every 8-10 hours.  Then, taper to   or 1 tablet every 10-12 hours.  Then, taper to   or 1 tablet at bedtime.  The bedtime dose can help with comfort during sleep and is typically the last dose to be discontinued after surgery.     Follow Up Care   Order Comments: Please follow-up with Dr. Marvin / Reynaldo Eli PA-C in 2 weeks at [facility]. Call our scheduling line at [phone] to make an appointment if you do not already have one scheduled.     Shower with wound/dressing covered   Order Comments: You must COVER your dressing or incision with saran wrap (or any other non-permeable covering) to allow the incision to remain dry while showering.  You may shower after surgery as long as the surgical wound stays dry. Continue to cover your dressing or incision for showering until your first office visit.  You are strictly prohibited from soaking   or submerging the surgical wound underwater.     Medication instructions -  Anticoagulation - aspirin   Order Comments: Take the aspirin as prescribed for a  "total of four weeks after surgery.  This is given to help minimize your risk of blood clot.     Comfort and Pain Management - LOWER Extremity Elevation   Order Comments: Swelling is expected for several months after surgery. This type of swelling is usually associated with gravity and activity, and can be improved with elevation.   The best way to do this is to get your \"toes above your nose\" by laying down and placing several pillows lengthwise under your calf and heel. When elevating your leg keep your knee completely straight. Perform this elevation as often as possible especially for the first two weeks after surgery.     Medication Instructions - Opioid Instructions (Greater than or equal to 65 years)   Order Comments: You were discharged with an opioid medication (hydromorphone, oxycodone, hydrocodone, or tramadol). This medication should only be taken for breakthrough pain that is not controlled with acetaminophen (TYLENOL). If you rate your pain less than 3 you do not need this medication.  Pain rating 0-3:  You do not need this medication  Pain rating 4-6:  Take 1/2 tablet every 4-6 hours as needed  Pain rating 7-10:  Take 1 tablet every 4-6 hours as needed  Do not exceed 4 tablets per day     General info for SNF   Order Comments: Length of Stay Estimate: Short Term Care: Estimated # of Days <30  Condition at Discharge: Stable  Level of care:skilled   Rehabilitation Potential: Good  Admission H&P remains valid and up-to-date: Yes  Recent Chemotherapy: N/A  Use Nursing Home Standing Orders: Yes     Mantoux instructions   Order Comments: Give two-step Mantoux (PPD) Per Facility Policy Yes     Follow Up and recommended labs and tests   Order Comments: Follow-up with orthopedics as recommended     Weight bearing status   Order Comments: As tolerated     Reason for your hospital stay   Order Comments: S/p mechanical fall.     Daily weights   Order Comments: Call Provider for weight gain of more than 2 pounds " per day or 5 pounds per week.     Activity - Up with assistive device     Order Specific Question Answer Comments   Is discharge order? Yes      Full Code     Order Specific Question Answer Comments   Code status determined by: Discussion with patient/ legal decision maker      Physical Therapy Adult Consult   Order Comments: Evaluate and treat as clinically indicated.    Reason: Status Post Hip Surgery     Occupational Therapy Adult Consult   Order Comments: Evaluate and treat as clinically indicated.    Reason: Status Post Hip Surgery     Physical Therapy Adult Consult   Order Comments: Evaluate and treat as clinically indicated.    Reason: Left hip fracture     Occupational Therapy Adult Consult   Order Comments: Evaluate and treat as clinically indicated.    Reason: Left Hip fracture     Fall precautions     Cane DME   Order Comments: DME Documentation: Describe the reason for need to support medical necessity: Impaired gait status post hip surgery. I, the undersigned, certify that the above prescribed supplies are medically necessary for this patient and is both reasonable and necessary in reference to accepted standards of medical practice in the treatment of this patient's condition and is not prescribed as a convenience.     Order Specific Question Answer Comments   JEREMIAS Provider: [employer]    Cane Type: Single Tip    Reminder: Patient can typically get 1 every 5 years      Walker DME   Order Comments: : DME Documentation: Describe the reason for need to support medical necessity: Impaired gait status post hip surgery. I, the undersigned, certify that the above prescribed supplies are medically necessary for this patient and is both reasonable and necessary in reference to accepted standards of medical practice in the treatment of this patient's condition and is not prescribed as a convenience.     Order Specific Question Answer Comments   JEREMIAS Provider: [employer]    Walker Type: Standard (2 Wheel)   "  Accessories: N/A      Advance Diet as Tolerated   Order Comments: Follow this diet upon discharge: Orders Placed This Encounter      Advance Diet as Tolerated: Regular Diet Adult     Order Specific Question Answer Comments   Is discharge order? Yes      Examination     Vital Signs in last 24 hours:   Vital signs:  Temp: 98  F (36.7  C) Temp src: Oral BP: (!) 174/79 Pulse: 75   Resp: 18 SpO2: 96 % O2 Device: None (Room air)   Height: 160 cm (5' 3\") Weight: 48.4 kg (106 lb 11.2 oz)  Estimated body mass index is 18.9 kg/m  as calculated from the following:    Height as of this encounter: 1.6 m (5' 3\").    Weight as of this encounter: 48.4 kg (106 lb 11.2 oz).        General appearance: alert, appears stated age and cooperative       Please see EMR for more detailed significant labs, imaging, consultant notes etc.  Total time spent on discharge: 35 minutes    Charles Vasquez MD   [facility] Service: Ph:[phone]    CC:Everardo English    "

## 2022-02-21 NOTE — PROGRESS NOTES
"Orthopedic Progress Note      Assessment: 4 Days Post-Op  S/P Procedure(s):  HEMIARTHROPLASTY, HIP, BIPOLAR     Plan:   - Continue PT/OT  - Weightbearing status: WBAT  - Anticoagulation: lovenox while inpatient, transition to ASA outpatient if ambulating in addition to SCDs, estee stockings and early ambulation.  - Discharge planning: pending pain control, medical clearance, and TCU placement      Subjective:  Patient states her pain is minimal at this time. She denies any numbness/tingling. She does not like the abductor pillow. She has not had any new acute events since yesterday.     Objective:  BP (!) 174/79 (BP Location: Left arm)   Pulse 75   Temp 98  F (36.7  C) (Oral)   Resp 18   Ht 1.6 m (5' 3\")   Wt 48.4 kg (106 lb 11.2 oz)   SpO2 96%   BMI 18.90 kg/m    The patient is A&Ox3. Appears comfortable. Abductor pillow in place  Sensation is intact.  Dorsiflexion and plantar flexion is intact.  Dorsalis pedis pulse intact.  Calves are soft and non-tender. Negative Akash's.  The incision is covered. Dressing C/D/I. Very mild incisional tenderness today.     Pertinent Labs   Lab Results: personally reviewed.   Lab Results   Component Value Date    INR 1.07 02/16/2022     Lab Results   Component Value Date    WBC 9.1 02/17/2022    HGB 8.4 (L) 02/20/2022    HCT 33.4 (L) 02/17/2022    MCV 97 02/17/2022     02/20/2022     Lab Results   Component Value Date     02/20/2022    CO2 16 (L) 02/20/2022         Report completed by:  Fernando Proctor PA-C, DOMI  Date: 2/21/2022  Time: 8:14 AM    "

## 2022-02-21 NOTE — PLAN OF CARE
Occupational Therapy Discharge Summary    Reason for therapy discharge:    Discharged to transitional care facility.    Progress towards therapy goal(s). See goals on Care Plan in [facility] electronic health record for goal details.  Goals partially met.  Barriers to achieving goals:   discharge from facility.    Therapy recommendation(s):    Continued therapy is recommended.  Rationale/Recommendations:  To improve trnf safety & ADL independence w/in surgical precautions.

## 2022-02-22 NOTE — PROGRESS NOTES
Clinic Care Coordination Contact    Background: Care Coordination referral placed from S discharge report for reason of patient meeting criteria for a TCM outreach call by [facility] team.    Assessment: Upon chart review, CCRC Team member will cancel/close the referral for TCM outreach due to reason below:    Patient is not established within [facility]. Upon chart review, CCRC Team member noted patient discharged to TCU    Plan: Care Coordination referral for TCM outreach canceled.    Juanita Lehman  Community Health Worker  [facility], [facility]  Ph: [phone]

## 2022-02-23 PROBLEM — K59.01 SLOW TRANSIT CONSTIPATION: Status: ACTIVE | Noted: 2022-01-01

## 2022-02-23 PROBLEM — N17.9 ACUTE RENAL FAILURE SYNDROME (H): Status: ACTIVE | Noted: 2022-01-01

## 2022-02-23 PROBLEM — E83.52 HYPERCALCEMIA: Status: ACTIVE | Noted: 2021-10-07

## 2022-02-23 PROBLEM — F41.9 ANXIETY DISORDER: Status: ACTIVE | Noted: 2022-01-01

## 2022-02-23 PROBLEM — M25.519 SHOULDER JOINT PAIN: Status: ACTIVE | Noted: 2022-01-01

## 2022-02-23 PROBLEM — N28.89 OTHER SPECIFIED DISORDERS OF KIDNEY AND URETER: Status: ACTIVE | Noted: 2022-01-01

## 2022-02-23 PROBLEM — G47.00 INSOMNIA: Status: ACTIVE | Noted: 2022-01-01

## 2022-02-24 NOTE — PROGRESS NOTES
[facility]ERI[facility]RIC SERVICES    Code Status:  FULL CODE   Visit Type:   Chief Complaint   Patient presents with     Hospital F/U     Admit to the TCU     Facility:  [facility]) [[id number]]           History of Present Illness: Holli Richardson is a 86 year old female who I am seeing today for admit to the TCU.  Patient recently hospitalized on 2/16/2022 secondary to fall with acute left femoral neck fracture.  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  Patient found to have acute displaced left femoral neck fracture and underwent a bipolar hemiarthroplasty on 2/16/2022.  She did have some acute urinary retention and a [place] catheter was placed.  This was removed prior to discharge and she was voiding adequately.  Patient with underlying chronic kidney disease stage IV.  Creatinine 1.68.  Her baseline is 1.7-2.2.  Hypertension.  She continues on diltiazem and as needed hydralazine.  Hyperlipidemia on gemfibrozil.  GERD on PPI.  Metabolic acidosis secondary to CKD on bicarb twice daily.  Cognitive impairment.  Acute blood loss anemia.  Hemoglobin remained stable.  Constipation.  She was started on lactulose 20 g twice daily.  Moderate protein calorie malnutrition.  She was started on Remeron.  Underlying depression with anxiety.  She does take bupropion.  She was treated with Seroquel during her hospitalization however she did become drowsy and this was changed to as needed and Remeron was started.  Head CT negative.    Today patient sitting up in wheelchair.  Patient reports very little pain in her hip.  She was given an oxycodone per the nursing staff earlier this a.m.  She does have baseline confusion.  Recent slums was 10 out of 30.  Nursing staff report impulsiveness with behaviors.  She has used 1 dose of as needed Seroquel since admit.  She was recently started on mirtazapine at 7.5 mg.  She is also on  bupropion.  Nursing staff reports she is eating well.  She is having regular bowel movements and emptying her bladder.  She is incontinent at times.    Active Ambulatory Problems     Diagnosis Date Noted     Stage 4 chronic kidney disease (H) 09/22/2021     Hyperlipidemia 09/22/2021     Moderate major depression, single episode (H) 09/22/2021     Rib fracture 07/09/2019     Senile osteoporosis 09/22/2021     Hyponatremia 09/22/2021     KAM (acute kidney injury) (H) 09/22/2021     Acute on chronic congestive heart failure, unspecified heart failure type (H) 09/22/2021     Acute on chronic diastolic heart failure (H) 02/16/2022     Dementia (H) 02/16/2022     Hypertension with congestive heart failure and renal failure (H) 02/16/2022     Fall, initial encounter 02/16/2022     Hip fracture, left, closed, initial encounter (H) 02/16/2022     Acute urinary retention 02/17/2022     Acute renal failure syndrome (H) 02/23/2022     Anxiety disorder 02/23/2022     Hypercalcemia 10/07/2021     Insomnia 02/23/2022     Other specified disorders of kidney and ureter 02/23/2022     Shoulder joint pain 02/23/2022     Slow transit constipation 02/23/2022     Resolved Ambulatory Problems     Diagnosis Date Noted     No Resolved Ambulatory Problems     Past Medical History:   Diagnosis Date     CHF (congestive heart failure) (H)      CKD (chronic kidney disease)      Depressive disorder      HLD (hyperlipidemia)      Hypertension        Current Outpatient Medications:      acetaminophen (TYLENOL) 325 MG tablet, Take 975 mg by mouth 3 times daily, Disp: , Rfl:      aspirin (ASA) 81 MG EC tablet, Take 1 tablet (81 mg) by mouth two times daily for 28 days, Disp: 56 tablet, Rfl: 0     bisacodyl (DULCOLAX) 10 MG suppository, Place 1 suppository (10 mg) rectally daily as needed for constipation (Use if Magnesium hydroxide (MILK of MAGNESIA) not effective after 24 hours. May discontinue if patient having bowel movement.), Disp: , Rfl:       buPROPion (WELLBUTRIN XL) 150 MG 24 hr tablet, Take 150 mg by mouth daily, Disp: , Rfl:      cholecalciferol 50 MCG (2000 UT) tablet, Take 2,000 Units by mouth daily, Disp: , Rfl:      diltiazem ER COATED BEADS (CARDIZEM CD/CARTIA XT) 120 MG 24 hr capsule, Take 120 mg by mouth daily, Disp: , Rfl:      dorzolamide-timolol PF (COSOPT) 22.3-6.8 MG/ML opthalmic solution, Place 1 drop Into the left eye 2 times daily, Disp: , Rfl:      gemfibroziL (LOPID) 600 MG tablet, [GEMFIBROZIL (LOPID) 600 MG TABLET] Take 600 mg by mouth 2 (two) times a day before meals., Disp: , Rfl:      lactulose (CEPHULAC) 20 GM packet, Take 1 packet (20 g) by mouth 2 times daily for 2 days Hold if more than 2 bowel movements a day., Disp: 4 packet, Rfl: 0     latanoprost (XALATAN) 0.005 % ophthalmic solution, Place 1 drop into both eyes At Bedtime , Disp: , Rfl:      melatonin 3 MG tablet, Take 1 tablet (3 mg) by mouth At Bedtime, Disp: 30 tablet, Rfl: 0     mirtazapine (REMERON) 7.5 MG tablet, Take 1 tablet (7.5 mg) by mouth At Bedtime (Patient taking differently: Take 15 mg by mouth At Bedtime ), Disp: , Rfl:      multivitamin  with lutein (OCUVITE WITH LUTEIN) CAPS per capsule, Take 1 capsule by mouth daily, Disp: , Rfl:      omeprazole (PRILOSEC) 20 MG DR capsule, Take 20 mg by mouth daily, Disp: , Rfl:      oxyCODONE (ROXICODONE) 5 MG tablet, Take 0.5-1 tablets (2.5-5 mg) by mouth every 4 hours as needed for severe pain ((pain rating 7-10)) 0.5 tablet for pain 4-6, 1 tablet pain 7-10, Disp: 30 tablet, Rfl: 0     Probiotic Product (PROBIOTIC-10 PO), Take 1 tablet by mouth daily, Disp: , Rfl:      senna-docusate (SENOKOT-S/PERICOLACE) 8.6-50 MG tablet, Take 1 tablet by mouth 2 times daily, Disp: , Rfl:      sodium bicarbonate 650 MG tablet, Take 1 tablet (650 mg) by mouth 2 times daily, Disp: 60 tablet, Rfl: 0  Allergies   Allergen Reactions     Statins-Hmg-Coa Reductase Inhibitors [Hmg-Coa-R Inhibitors] Muscle Pain (Myalgia)       All Meds  "and Allergies reviewed in the record at the facility and is the most up-to-date    REVIEW OF SYSTEMS:   Review of Systems  No fevers or chills. No headache, lightheadedness or dizziness. No SOB, chest pains or palpitations. Appetite is good. No nausea, vomiting, constipation or diarrhea. No dysuria, frequency, burning or pain with urination.  Pain control with Tylenol and oxycodone.  She does have some increased behaviors in the late evening.  She is wondering with her wheelchair and impulsive.  Otherwise review of systems are negative.     PHYSICAL EXAMINATION:  Physical Exam     Vital signs: /74   Pulse 76   Temp 97.3  F (36.3  C)   Resp 16   Ht 1.6 m (5' 3\")   Wt 48.1 kg (106 lb)   SpO2 97%   BMI 18.78 kg/m    General: Awake, Alert, oriented x1, appropriately, follows simple commands, conversant  HEENT:PERRLA, Pink conjunctiva, anicteric sclerae,  oral mucosa  NECK: Supple, without any lymphadenopathy, or masses  CVS:  S1  S2, without murmur or gallop.   LUNG: Clear to auscultation, No wheezes, rales or rhonci.  BACK: No kyphosis of the thoracic spine  ABDOMEN: Soft, thin, nontender to palpation, with positive bowel sounds  EXTREMITIES: Good range of motion on both upper and lower extremities, no pedal edema, no calf tenderness  SKIN: Tegaderm to left hip dry and intact.  NEUROLOGIC: Intact, pulses palpable  PSYCHIATRIC: Cognitive impairment noted.  Recent slums 10 out of 30.      Labs:  All labs reviewed in the nursing home record and Epic   @  Lab Results   Component Value Date    WBC 9.1 02/17/2022     Lab Results   Component Value Date    RBC 3.44 02/17/2022     Lab Results   Component Value Date    HGB 8.4 02/20/2022     Lab Results   Component Value Date    HCT 33.4 02/17/2022     Lab Results   Component Value Date    MCV 97 02/17/2022     Lab Results   Component Value Date    MCH 31.1 02/17/2022     Lab Results   Component Value Date    MCHC 32.0 02/17/2022     Lab Results   Component Value " Date    RDW 15.5 02/17/2022     Lab Results   Component Value Date     02/20/2022        @Last Comprehensive Metabolic Panel:  Sodium   Date Value Ref Range Status   02/20/2022 138 136 - 145 mmol/L Final     Potassium   Date Value Ref Range Status   02/20/2022 4.4 3.5 - 5.0 mmol/L Final     Chloride   Date Value Ref Range Status   02/20/2022 112 (H) 98 - 107 mmol/L Final     Carbon Dioxide (CO2)   Date Value Ref Range Status   02/20/2022 16 (L) 22 - 31 mmol/L Final     Anion Gap   Date Value Ref Range Status   02/20/2022 10 5 - 18 mmol/L Final     Glucose   Date Value Ref Range Status   02/20/2022 151 (H) 70 - 125 mg/dL Final     GLUCOSE BY METER POCT   Date Value Ref Range Status   02/20/2022 99 70 - 99 mg/dL Final     Urea Nitrogen   Date Value Ref Range Status   02/20/2022 30 (H) 8 - 28 mg/dL Final     Creatinine   Date Value Ref Range Status   02/20/2022 1.68 (H) 0.60 - 1.10 mg/dL Final     GFR Estimate   Date Value Ref Range Status   02/20/2022 30 (L) >60 mL/min/1.73m2 Final     Comment:     Effective December 21, 2021 eGFRcr in adults is calculated using the 2021 CKD-EPI creatinine equation which includes age and gender (Aydin et al., [place], DOI: 10.1056/NKRSoo4908692)   07/09/2021 25 (L) >60 mL/min/1.73m2 Final     Calcium   Date Value Ref Range Status   02/20/2022 9.0 8.5 - 10.5 mg/dL Final         Assessment/Plan:    ICD-10-CM    1. Left displaced femoral neck fracture (H)  S72.002A  okay for PT OT eval and treat.   2. Status post open reduction and internal fixation (ORIF) of fracture  Z98.890  continue with Tylenol and oxycodone for pain.  Will attempt to wean off oxycodone secondary to underlying cognitive impairment.    Z87.81    3. Cognitive impairment  R41.89  increased behaviors in the evening.  Increase Remeron to 15 mg nightly.    DC Seroquel.   4. Chronic kidney disease, stage IV (severe) (H)  N18.4  creatinine 1.68.  Baseline 1.7-2.2.  Follow-up BMP in the a.m.   5. Metabolic acidosis   E87.2  due to chronic kidney disease.  Continues on sodium bicarb.  Repeat BMP in the a.m.   6. Anxiety with depression  F41.8  continues on bupropion.  Remeron started during hospitalization.  Increase to 15 mg.   7. Anemia due to blood loss, acute  D62  hemoglobin 8.4.  Repeat CBC in the a.m.         This note has been dictated using voice recognition software. Any grammatical or context distortions are unintentional and inherent to the software    Electronically signed by: Shahnaz Martinez CNP

## 2022-03-01 PROBLEM — E87.20 METABOLIC ACIDOSIS: Status: ACTIVE | Noted: 2022-01-01

## 2022-03-01 PROBLEM — Z98.890 STATUS POST OPEN REDUCTION AND INTERNAL FIXATION (ORIF) OF FRACTURE: Status: ACTIVE | Noted: 2022-01-01

## 2022-03-01 PROBLEM — R41.89 COGNITIVE IMPAIRMENT: Status: ACTIVE | Noted: 2022-01-01

## 2022-03-01 PROBLEM — Z87.81 STATUS POST OPEN REDUCTION AND INTERNAL FIXATION (ORIF) OF FRACTURE: Status: ACTIVE | Noted: 2022-01-01

## 2022-03-01 PROBLEM — F41.8 ANXIETY WITH DEPRESSION: Status: ACTIVE | Noted: 2022-01-01

## 2022-03-01 PROBLEM — I51.9 HEART DISEASE: Status: ACTIVE | Noted: 2022-01-01

## 2022-03-02 NOTE — PROGRESS NOTES
[facility] GERIATRIC SERVICES    Code Status:  FULL CODE   Visit Type:   Chief Complaint   Patient presents with     Nursing Home Acute     Facility:  [facility] ([facility]) [[id number]]           History of Present Illness: Holli Richardson is a 86 year old female who I am seeing today for follow up on the TCU.  Patient recently hospitalized on 2/16/2022 secondary to fall with acute left femoral neck fracture.  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  Patient found to have acute displaced left femoral neck fracture and underwent a bipolar hemiarthroplasty on 2/16/2022.  She did have some acute urinary retention and a Livingston catheter was placed.  This was removed prior to discharge and she was voiding adequately.  Patient with underlying chronic kidney disease stage IV.  Creatinine 1.68.  Her baseline is 1.7-2.2.  Hypertension.  She continues on diltiazem and as needed hydralazine.  Hyperlipidemia on gemfibrozil.  GERD on PPI.  Metabolic acidosis secondary to CKD on bicarb twice daily.  Cognitive impairment.  Acute blood loss anemia.  Hemoglobin remained stable.  Constipation.  She was started on lactulose 20 g twice daily.  Moderate protein calorie malnutrition.  She was started on Remeron.  Underlying depression with anxiety.  She does take bupropion.  She was treated with Seroquel during her hospitalization however she did become drowsy and this was changed to as needed and Remeron was started.  Head CT negative.    Today patient sitting up in bedside chair. Right hip fracture. Pain controlled with tylenol and oxycodone. Pt with increasing anxiety. She continues on bupropion and mirtazapine. Mirtazapine recently increased. Pt with sundowning in the late evening. Today she is tearful. She is eager to get back home. She is receiving Seroquel 12.5 mg at HS. Nursing staff continue to report impulsiveness. GERD on PPI.  Appetite fair. She is on supplement. Metabolic acidosis due to CKD on bicarb.       Active Ambulatory Problems     Diagnosis Date Noted     Chronic kidney disease, stage IV (severe) (H) 09/22/2021     Hyperlipidemia 09/22/2021     Major depressive disorder, single episode, moderate (H) 09/22/2021     Rib fracture 07/09/2019     Senile osteoporosis 09/22/2021     Hyponatremia 09/22/2021     KAM (acute kidney injury) (H) 09/22/2021     Acute on chronic congestive heart failure, unspecified heart failure type (H) 09/22/2021     Acute on chronic diastolic heart failure (H) 02/16/2022     Dementia (H) 02/16/2022     Heart disease 02/16/2022     Fall, initial encounter 02/16/2022     Left displaced femoral neck fracture (H) 02/16/2022     Acute urinary retention 02/17/2022     Acute renal failure syndrome (H) 02/23/2022     Anxiety with depression 02/23/2022     Hypercalcemia 10/07/2021     Insomnia 02/23/2022     Other specified disorders of kidney and ureter 02/23/2022     Shoulder joint pain 02/23/2022     Slow transit constipation 02/23/2022     Status post open reduction and internal fixation (ORIF) of fracture 03/01/2022     Cognitive impairment 03/01/2022     Metabolic acidosis 03/01/2022     Resolved Ambulatory Problems     Diagnosis Date Noted     No Resolved Ambulatory Problems     Past Medical History:   Diagnosis Date     CHF (congestive heart failure) (H)      CKD (chronic kidney disease)      Depressive disorder      HLD (hyperlipidemia)      Hypertension        Current Outpatient Medications:      acetaminophen (TYLENOL) 325 MG tablet, Take 975 mg by mouth 3 times daily, Disp: , Rfl:      aspirin (ASA) 81 MG EC tablet, Take 1 tablet (81 mg) by mouth two times daily for 28 days, Disp: 56 tablet, Rfl: 0     bisacodyl (DULCOLAX) 10 MG suppository, Place 1 suppository (10 mg) rectally daily as needed for constipation (Use if Magnesium hydroxide (MILK of MAGNESIA) not effective after 24 hours. May discontinue if  patient having bowel movement.), Disp: , Rfl:      buPROPion (WELLBUTRIN XL) 150 MG 24 hr tablet, Take 150 mg by mouth daily, Disp: , Rfl:      cholecalciferol 50 MCG (2000 UT) tablet, Take 2,000 Units by mouth daily, Disp: , Rfl:      diltiazem ER COATED BEADS (CARDIZEM CD/CARTIA XT) 120 MG 24 hr capsule, Take 120 mg by mouth daily, Disp: , Rfl:      dorzolamide-timolol PF (COSOPT) 22.3-6.8 MG/ML opthalmic solution, Place 1 drop Into the left eye 2 times daily, Disp: , Rfl:      gemfibroziL (LOPID) 600 MG tablet, [GEMFIBROZIL (LOPID) 600 MG TABLET] Take 600 mg by mouth 2 (two) times a day before meals., Disp: , Rfl:      latanoprost (XALATAN) 0.005 % ophthalmic solution, Place 1 drop into both eyes At Bedtime , Disp: , Rfl:      melatonin 3 MG tablet, Take 1 tablet (3 mg) by mouth At Bedtime, Disp: 30 tablet, Rfl: 0     mirtazapine (REMERON) 7.5 MG tablet, Take 1 tablet (7.5 mg) by mouth At Bedtime (Patient taking differently: Take 15 mg by mouth At Bedtime ), Disp: , Rfl:      multivitamin  with lutein (OCUVITE WITH LUTEIN) CAPS per capsule, Take 1 capsule by mouth daily, Disp: , Rfl:      omeprazole (PRILOSEC) 20 MG DR capsule, Take 20 mg by mouth daily, Disp: , Rfl:      oxyCODONE (ROXICODONE) 5 MG tablet, Take 0.5-1 tablets (2.5-5 mg) by mouth every 4 hours as needed for severe pain ((pain rating 7-10)) 0.5 tablet for pain 4-6, 1 tablet pain 7-10, Disp: 30 tablet, Rfl: 0     Probiotic Product (PROBIOTIC-10 PO), Take 1 tablet by mouth daily, Disp: , Rfl:      senna-docusate (SENOKOT-S/PERICOLACE) 8.6-50 MG tablet, Take 1 tablet by mouth 2 times daily, Disp: , Rfl:      sodium bicarbonate 650 MG tablet, Take 1 tablet (650 mg) by mouth 2 times daily, Disp: 60 tablet, Rfl: 0  Allergies   Allergen Reactions     Statins-Hmg-Coa Reductase Inhibitors [Hmg-Coa-R Inhibitors] Muscle Pain (Myalgia)       All Meds and Allergies reviewed in the record at the facility and is the most up-to-date    REVIEW OF SYSTEMS:   Review  "of Systems  No fevers or chills. No headache, lightheadedness or dizziness. No SOB, chest pains or palpitations. Appetite is good. No nausea, vomiting, constipation or diarrhea. No dysuria, frequency, burning or pain with urination.  Pain control with Tylenol and oxycodone.  She does have some increased behaviors in the late evening with impulsiveness.  Otherwise review of systems are negative.     PHYSICAL EXAMINATION:  Physical Exam     Vital signs: BP (!) 149/65   Pulse 72   Temp (!) 96.5  F (35.8  C)   Resp 18   Ht 1.6 m (5' 3\")   Wt 52.7 kg (116 lb 3.2 oz)   SpO2 97%   BMI 20.58 kg/m    General: Awake, Alert, oriented x1, appropriately, follows simple commands, conversant  HEENT: Pink conjunctiva, anicteric sclerae,  oral mucosa  NECK: Supple, without any lymphadenopathy, or masses  CVS:  S1  S2, without murmur or gallop.   LUNG: Clear to auscultation, No wheezes, rales or rhonci.  BACK: No kyphosis of the thoracic spine  ABDOMEN: Soft, thin, nontender to palpation, with positive bowel sounds  EXTREMITIES: Good range of motion on both upper and lower extremities, no pedal edema, no calf tenderness  SKIN: Tegaderm to left hip dry and intact.  NEUROLOGIC: Intact, pulses palpable  PSYCHIATRIC: Cognitive impairment noted.  Recent slums 10 out of 30.      Labs:  All labs reviewed in the nursing home record and [facility]   @  Lab Results   Component Value Date    WBC 9.1 02/17/2022     Lab Results   Component Value Date    RBC 3.44 02/17/2022     Lab Results   Component Value Date    HGB 8.4 02/20/2022     Lab Results   Component Value Date    HCT 33.4 02/17/2022     Lab Results   Component Value Date    MCV 97 02/17/2022     Lab Results   Component Value Date    MCH 31.1 02/17/2022     Lab Results   Component Value Date    MCHC 32.0 02/17/2022     Lab Results   Component Value Date    RDW 15.5 02/17/2022     Lab Results   Component Value Date     02/20/2022        @Last Comprehensive Metabolic " Panel:  Sodium   Date Value Ref Range Status   02/24/2022 137 136 - 145 mmol/L Final     Potassium   Date Value Ref Range Status   02/24/2022 4.9 3.5 - 5.0 mmol/L Final     Chloride   Date Value Ref Range Status   02/24/2022 108 (H) 98 - 107 mmol/L Final     Carbon Dioxide (CO2)   Date Value Ref Range Status   02/24/2022 20 (L) 22 - 31 mmol/L Final     Anion Gap   Date Value Ref Range Status   02/24/2022 9 5 - 18 mmol/L Final     Glucose   Date Value Ref Range Status   02/24/2022 91 70 - 125 mg/dL Final     Urea Nitrogen   Date Value Ref Range Status   02/24/2022 31 (H) 8 - 28 mg/dL Final     Creatinine   Date Value Ref Range Status   02/24/2022 1.78 (H) 0.60 - 1.10 mg/dL Final     GFR Estimate   Date Value Ref Range Status   02/24/2022 28 (L) >60 mL/min/1.73m2 Final     Comment:     Effective December 21, 2021 eGFRcr in adults is calculated using the 2021 CKD-EPI creatinine equation which includes age and gender (Aydin et al., NEJ, DOI: 10.1056/SFSSdr6877967)   07/09/2021 25 (L) >60 mL/min/1.73m2 Final     Calcium   Date Value Ref Range Status   02/24/2022 10.1 8.5 - 10.5 mg/dL Final         Assessment/Plan:    ICD-10-CM    1. Left displaced femoral neck fracture (H)  S72.002A  Continues in therapy.    2. Status post open reduction and internal fixation (ORIF) of fracture  Z98.890  continue with Tylenol and oxycodone for pain.  Will attempt to wean off oxycodone secondary to underlying cognitive impairment.    Z87.81    3. Cognitive impairment  R41.89  increased behaviors in the evening.  Increase Seroquel to 12.5 mg BID.        4. Chronic kidney disease, stage IV (severe) (H)  N18.4  creatinine 1.78.  Baseline 1.7-2.2.   Encourage fluids.    5. Metabolic acidosis  E87.2  due to chronic kidney disease.  Continues on sodium bicarb. Continue to monitor.    6. Anxiety with depression  F41.8  continues on bupropion.  Remeron started during hospitalization.  Increase to 15 mg.   7. Anemia due to blood loss, acute  D62   hemoglobin 8.7.  Repeat CBC on Thursday.          This note has been dictated using voice recognition software. Any grammatical or context distortions are unintentional and inherent to the software    Electronically signed by: Shahnaz Martinez CNP

## 2022-03-03 NOTE — LETTER
3/3/2022        RE: Holli Richardson  [facility]  [address]  [facility] [address]        [facility]    Code Status:  FULL CODE   Visit Type:   Chief Complaint   Patient presents with     Nursing Home Acute     TCU Follow up     Facility:  [facility] ([facility]) [[id number]]           History of Present Illness: Holli Richardson is a 86 year old female who I am seeing today for follow up on the TCU.  Patient recently hospitalized on 2/16/2022 secondary to fall with acute left femoral neck fracture.  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  Patient found to have acute displaced left femoral neck fracture and underwent a bipolar hemiarthroplasty on 2/16/2022.  She did have some acute urinary retention and a Livingston catheter was placed.  This was removed prior to discharge and she was voiding adequately.  Patient with underlying chronic kidney disease stage IV.  Creatinine 1.68.  Her baseline is 1.7-2.2.  Hypertension.  She continues on diltiazem and as needed hydralazine.  Hyperlipidemia on gemfibrozil.  GERD on PPI.  Metabolic acidosis secondary to CKD on bicarb twice daily.  Cognitive impairment.  Acute blood loss anemia.  Hemoglobin remained stable.  Constipation.  She was started on lactulose 20 g twice daily.  Moderate protein calorie malnutrition.  She was started on Remeron.  Underlying depression with anxiety.  She does take bupropion.  She was treated with Seroquel during her hospitalization however she did become drowsy and this was changed to as needed and Remeron was started.  Head CT negative.    Today patient sitting up in bedside chair. Right hip fracture. Pain controlled with tylenol and oxycodone.  Patient with underlying cognitive impairment.  She has had some difficulty with transitioning to being at the TCU.  Chronic history of anxiety.  Patient  unconsolable at times per the nursing staff.  Patient with periods of increased confusion consistent with sundowning.  She was prescribed Seroquel during hospitalization however I discontinued this but had to restart secondary to above symptoms.  Today I talked with her daughter.  Her daughter initially did not want her to be on Seroquel.  I did review overall goal of care and to discontinue this medication when she is discharged back to her familiar environment.  After much discussion her daughter is okay with her receiving 12.5 mg at bedtime and if behaviors increased during the day resuming 12 point 5 in the morning.  Patient crying on exam today.  I discussed with her daughter at length her history of anxiety and depression.  She was previously on Celexa around 5 months prior but developed SIADH and this was discontinued.  She was started on bupropion.  During hospitalization she was started on Remeron.  We did discuss again possible side effects.  Also reviewed other medications outside of the SSRI class to reduce symptoms as SIADH/hyponatremia.  Daughter in agreement to discontinue Remeron.  Daughter feels patient's symptoms were well controlled in her normal environment.  Family has been visiting during the daytime to help with adjustment.  GERD on PPI. Appetite fair. She is on supplement. Metabolic acidosis due to CKD on bicarb.  Laboratory reviewed with daughter.  Sodium today 140.  Creatinine at baseline 1.73.      Active Ambulatory Problems     Diagnosis Date Noted     Chronic kidney disease, stage IV (severe) (H) 09/22/2021     Hyperlipidemia 09/22/2021     Major depressive disorder, single episode, moderate (H) 09/22/2021     Rib fracture 07/09/2019     Senile osteoporosis 09/22/2021     Hyponatremia 09/22/2021     KAM (acute kidney injury) (H) 09/22/2021     Acute on chronic congestive heart failure, unspecified heart failure type (H) 09/22/2021     Acute on chronic diastolic heart failure (H)  02/16/2022     Dementia (H) 02/16/2022     Heart disease 02/16/2022     Fall, initial encounter 02/16/2022     Left displaced femoral neck fracture (H) 02/16/2022     Acute urinary retention 02/17/2022     Acute renal failure syndrome (H) 02/23/2022     Anxiety with depression 02/23/2022     Hypercalcemia 10/07/2021     Insomnia 02/23/2022     Other specified disorders of kidney and ureter 02/23/2022     Shoulder joint pain 02/23/2022     Slow transit constipation 02/23/2022     Status post open reduction and internal fixation (ORIF) of fracture 03/01/2022     Cognitive impairment 03/01/2022     Metabolic acidosis 03/01/2022     Resolved Ambulatory Problems     Diagnosis Date Noted     No Resolved Ambulatory Problems     Past Medical History:   Diagnosis Date     CHF (congestive heart failure) (H)      CKD (chronic kidney disease)      Depressive disorder      HLD (hyperlipidemia)      Hypertension        Current Outpatient Medications:      QUEtiapine (SEROQUEL) 25 MG tablet, Take 25 mg by mouth At Bedtime Give half tab to equal 12.5 mg nightly., Disp: , Rfl:      acetaminophen (TYLENOL) 325 MG tablet, Take 975 mg by mouth 3 times daily, Disp: , Rfl:      aspirin (ASA) 81 MG EC tablet, Take 1 tablet (81 mg) by mouth two times daily for 28 days, Disp: 56 tablet, Rfl: 0     bisacodyl (DULCOLAX) 10 MG suppository, Place 1 suppository (10 mg) rectally daily as needed for constipation (Use if Magnesium hydroxide (MILK of MAGNESIA) not effective after 24 hours. May discontinue if patient having bowel movement.), Disp: , Rfl:      buPROPion (WELLBUTRIN XL) 150 MG 24 hr tablet, Take 150 mg by mouth daily, Disp: , Rfl:      cholecalciferol 50 MCG (2000 UT) tablet, Take 2,000 Units by mouth daily, Disp: , Rfl:      diltiazem ER COATED BEADS (CARDIZEM CD/CARTIA XT) 120 MG 24 hr capsule, Take 120 mg by mouth daily, Disp: , Rfl:      dorzolamide-timolol PF (COSOPT) 22.3-6.8 MG/ML opthalmic solution, Place 1 drop Into the left  eye 2 times daily, Disp: , Rfl:      gemfibroziL (LOPID) 600 MG tablet, [GEMFIBROZIL (LOPID) 600 MG TABLET] Take 600 mg by mouth 2 (two) times a day before meals., Disp: , Rfl:      latanoprost (XALATAN) 0.005 % ophthalmic solution, Place 1 drop into both eyes At Bedtime , Disp: , Rfl:      melatonin 3 MG tablet, Take 1 tablet (3 mg) by mouth At Bedtime, Disp: 30 tablet, Rfl: 0     multivitamin  with lutein (OCUVITE WITH LUTEIN) CAPS per capsule, Take 1 capsule by mouth daily, Disp: , Rfl:      omeprazole (PRILOSEC) 20 MG DR capsule, Take 20 mg by mouth daily, Disp: , Rfl:      oxyCODONE (ROXICODONE) 5 MG tablet, Take 0.5-1 tablets (2.5-5 mg) by mouth every 4 hours as needed for severe pain ((pain rating 7-10)) 0.5 tablet for pain 4-6, 1 tablet pain 7-10, Disp: 30 tablet, Rfl: 0     Probiotic Product (PROBIOTIC-10 PO), Take 1 tablet by mouth daily, Disp: , Rfl:      senna-docusate (SENOKOT-S/PERICOLACE) 8.6-50 MG tablet, Take 1 tablet by mouth 2 times daily, Disp: , Rfl:      sodium bicarbonate 650 MG tablet, Take 1 tablet (650 mg) by mouth 2 times daily, Disp: 60 tablet, Rfl: 0  Allergies   Allergen Reactions     Statins-Hmg-Coa Reductase Inhibitors [Hmg-Coa-R Inhibitors] Muscle Pain (Myalgia)       All Meds and Allergies reviewed in the record at the facility and is the most up-to-date    REVIEW OF SYSTEMS:   Review of Systems  No fevers or chills. No headache, lightheadedness or dizziness. No SOB, chest pains or palpitations. Appetite is good. No nausea, vomiting, constipation or diarrhea. No dysuria, frequency, burning or pain with urination.  Pain control with Tylenol and oxycodone.  She does have some increased behaviors in the late evening with impulsiveness.  Crying on exam.  Otherwise review of systems are negative.     PHYSICAL EXAMINATION:  Physical Exam     Vital signs: /75   Pulse 69   Temp 96.8  F (36  C)   Resp 16   Wt 52.7 kg (116 lb 3.2 oz)   SpO2 98%   BMI 20.58 kg/m    General: Awake,  Alert, oriented x1,  follows simple commands  HEENT: Pink conjunctiva, anicteric sclerae,  oral mucosa  NECK: Supple, without any lymphadenopathy, or masses  CVS:  S1  S2, without murmur or gallop.   LUNG: Clear to auscultation, No wheezes, rales or rhonci.  BACK: No kyphosis of the thoracic spine  ABDOMEN: Soft, thin, nontender to palpation, with positive bowel sounds  EXTREMITIES: Good range of motion on both upper and lower extremities, no pedal edema, no calf tenderness  SKIN: Tegaderm to left hip dry and intact.  NEUROLOGIC: Intact, pulses palpable  PSYCHIATRIC: Cognitive impairment noted.  Recent slums 10 out of 30.  Patient tearful on exam today.  Unable to consult.      Labs:  All labs reviewed in the nursing home record and [facility]   @  Lab Results   Component Value Date    WBC 9.1 02/17/2022     Lab Results   Component Value Date    RBC 3.44 02/17/2022     Lab Results   Component Value Date    HGB 8.4 02/20/2022     Lab Results   Component Value Date    HCT 33.4 02/17/2022     Lab Results   Component Value Date    MCV 97 02/17/2022     Lab Results   Component Value Date    MCH 31.1 02/17/2022     Lab Results   Component Value Date    MCHC 32.0 02/17/2022     Lab Results   Component Value Date    RDW 15.5 02/17/2022     Lab Results   Component Value Date     02/20/2022        @Last Comprehensive Metabolic Panel:  Sodium   Date Value Ref Range Status   03/03/2022 140 136 - 145 mmol/L Final     Potassium   Date Value Ref Range Status   03/03/2022 4.9 3.5 - 5.0 mmol/L Final     Chloride   Date Value Ref Range Status   03/03/2022 110 (H) 98 - 107 mmol/L Final     Carbon Dioxide (CO2)   Date Value Ref Range Status   03/03/2022 19 (L) 22 - 31 mmol/L Final     Anion Gap   Date Value Ref Range Status   03/03/2022 11 5 - 18 mmol/L Final     Glucose   Date Value Ref Range Status   03/03/2022 89 70 - 125 mg/dL Final     Urea Nitrogen   Date Value Ref Range Status   03/03/2022 32 (H) 8 - 28 mg/dL Final      Creatinine   Date Value Ref Range Status   03/03/2022 1.73 (H) 0.60 - 1.10 mg/dL Final     GFR Estimate   Date Value Ref Range Status   03/03/2022 29 (L) >60 mL/min/1.73m2 Final     Comment:     Effective December 21, 2021 eGFRcr in adults is calculated using the 2021 CKD-EPI creatinine equation which includes age and gender (Aydin et al., NE, DOI: 10.1056/QRSYiq9287688)   07/09/2021 25 (L) >60 mL/min/1.73m2 Final     Calcium   Date Value Ref Range Status   03/03/2022 10.7 (H) 8.5 - 10.5 mg/dL Final         Assessment/Plan:    ICD-10-CM    1. Left displaced femoral neck fracture (H)  S72.002A  Continues in therapy.    2. Status post open reduction and internal fixation (ORIF) of fracture  Z98.890  continue with Tylenol and oxycodone for pain.  Will attempt to wean off oxycodone secondary to underlying cognitive impairment.    Z87.81    3. Cognitive impairment  R41.89  increased behaviors in the evening.  Decrease Seroquel to 12.5 nightly.  Daughter is okay with resuming 12 point 5 in the AM if behaviors continue.        4. Chronic kidney disease, stage IV (severe) (H)  N18.4  creatinine 1.73.  Baseline 1.7-2.2.   Encourage fluids.    5. Metabolic acidosis  E87.2  due to chronic kidney disease.  Continues on sodium bicarb. Continue to monitor.    6. Anxiety with depression  F41.8  continues on bupropion.  Remeron started during hospitalization.   Okay to DC per daughter given her history of SIADH and low sodium.    7. Anemia due to blood loss, acute  D62  hemoglobin now 10.0 up from 8.7.            This note has been dictated using voice recognition software. Any grammatical or context distortions are unintentional and inherent to the software    Electronically signed by: Shahnaz Martinez CNP           Sincerely,        Shahnaz Martinez NP

## 2022-03-04 NOTE — PROGRESS NOTES
[facility] GERIATRIC SERVICES    Code Status:  FULL CODE   Visit Type:   Chief Complaint   Patient presents with     Nursing Home Acute     TCU Follow up     Facility:  [facility] ([facility]) [[id number]]           History of Present Illness: Holli Richardson is a 86 year old female who I am seeing today for follow up on the TCU.  Patient recently hospitalized on 2/16/2022 secondary to fall with acute left femoral neck fracture.  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  Patient found to have acute displaced left femoral neck fracture and underwent a bipolar hemiarthroplasty on 2/16/2022.  She did have some acute urinary retention and a Livingston catheter was placed.  This was removed prior to discharge and she was voiding adequately.  Patient with underlying chronic kidney disease stage IV.  Creatinine 1.68.  Her baseline is 1.7-2.2.  Hypertension.  She continues on diltiazem and as needed hydralazine.  Hyperlipidemia on gemfibrozil.  GERD on PPI.  Metabolic acidosis secondary to CKD on bicarb twice daily.  Cognitive impairment.  Acute blood loss anemia.  Hemoglobin remained stable.  Constipation.  She was started on lactulose 20 g twice daily.  Moderate protein calorie malnutrition.  She was started on Remeron.  Underlying depression with anxiety.  She does take bupropion.  She was treated with Seroquel during her hospitalization however she did become drowsy and this was changed to as needed and Remeron was started.  Head CT negative.    Today patient sitting up in bedside chair. Right hip fracture. Pain controlled with tylenol and oxycodone.  Patient with underlying cognitive impairment.  She has had some difficulty with transitioning to being at the TCU.  Chronic history of anxiety.  Patient unconsolable at times per the nursing staff.  Patient with periods of increased confusion consistent with sundowning.  She was  prescribed Seroquel during hospitalization however I discontinued this but had to restart secondary to above symptoms.  Today I talked with her daughter.  Her daughter initially did not want her to be on Seroquel.  I did review overall goal of care and to discontinue this medication when she is discharged back to her familiar environment.  After much discussion her daughter is okay with her receiving 12.5 mg at bedtime and if behaviors increased during the day resuming 12 point 5 in the morning.  Patient crying on exam today.  I discussed with her daughter at length her history of anxiety and depression.  She was previously on Celexa around 5 months prior but developed SIADH and this was discontinued.  She was started on bupropion.  During hospitalization she was started on Remeron.  We did discuss again possible side effects.  Also reviewed other medications outside of the SSRI class to reduce symptoms as SIADH/hyponatremia.  Daughter in agreement to discontinue Remeron.  Daughter feels patient's symptoms were well controlled in her normal environment.  Family has been visiting during the daytime to help with adjustment.  GERD on PPI. Appetite fair. She is on supplement. Metabolic acidosis due to CKD on bicarb.  Laboratory reviewed with daughter.  Sodium today 140.  Creatinine at baseline 1.73.      Active Ambulatory Problems     Diagnosis Date Noted     Chronic kidney disease, stage IV (severe) (H) 09/22/2021     Hyperlipidemia 09/22/2021     Major depressive disorder, single episode, moderate (H) 09/22/2021     Rib fracture 07/09/2019     Senile osteoporosis 09/22/2021     Hyponatremia 09/22/2021     KAM (acute kidney injury) (H) 09/22/2021     Acute on chronic congestive heart failure, unspecified heart failure type (H) 09/22/2021     Acute on chronic diastolic heart failure (H) 02/16/2022     Dementia (H) 02/16/2022     Heart disease 02/16/2022     Fall, initial encounter 02/16/2022     Left displaced femoral  neck fracture (H) 02/16/2022     Acute urinary retention 02/17/2022     Acute renal failure syndrome (H) 02/23/2022     Anxiety with depression 02/23/2022     Hypercalcemia 10/07/2021     Insomnia 02/23/2022     Other specified disorders of kidney and ureter 02/23/2022     Shoulder joint pain 02/23/2022     Slow transit constipation 02/23/2022     Status post open reduction and internal fixation (ORIF) of fracture 03/01/2022     Cognitive impairment 03/01/2022     Metabolic acidosis 03/01/2022     Resolved Ambulatory Problems     Diagnosis Date Noted     No Resolved Ambulatory Problems     Past Medical History:   Diagnosis Date     CHF (congestive heart failure) (H)      CKD (chronic kidney disease)      Depressive disorder      HLD (hyperlipidemia)      Hypertension        Current Outpatient Medications:      QUEtiapine (SEROQUEL) 25 MG tablet, Take 25 mg by mouth At Bedtime Give half tab to equal 12.5 mg nightly., Disp: , Rfl:      acetaminophen (TYLENOL) 325 MG tablet, Take 975 mg by mouth 3 times daily, Disp: , Rfl:      aspirin (ASA) 81 MG EC tablet, Take 1 tablet (81 mg) by mouth two times daily for 28 days, Disp: 56 tablet, Rfl: 0     bisacodyl (DULCOLAX) 10 MG suppository, Place 1 suppository (10 mg) rectally daily as needed for constipation (Use if Magnesium hydroxide (MILK of MAGNESIA) not effective after 24 hours. May discontinue if patient having bowel movement.), Disp: , Rfl:      buPROPion (WELLBUTRIN XL) 150 MG 24 hr tablet, Take 150 mg by mouth daily, Disp: , Rfl:      cholecalciferol 50 MCG (2000 UT) tablet, Take 2,000 Units by mouth daily, Disp: , Rfl:      diltiazem ER COATED BEADS (CARDIZEM CD/CARTIA XT) 120 MG 24 hr capsule, Take 120 mg by mouth daily, Disp: , Rfl:      dorzolamide-timolol PF (COSOPT) 22.3-6.8 MG/ML opthalmic solution, Place 1 drop Into the left eye 2 times daily, Disp: , Rfl:      gemfibroziL (LOPID) 600 MG tablet, [GEMFIBROZIL (LOPID) 600 MG TABLET] Take 600 mg by mouth 2  (two) times a day before meals., Disp: , Rfl:      latanoprost (XALATAN) 0.005 % ophthalmic solution, Place 1 drop into both eyes At Bedtime , Disp: , Rfl:      melatonin 3 MG tablet, Take 1 tablet (3 mg) by mouth At Bedtime, Disp: 30 tablet, Rfl: 0     multivitamin  with lutein (OCUVITE WITH LUTEIN) CAPS per capsule, Take 1 capsule by mouth daily, Disp: , Rfl:      omeprazole (PRILOSEC) 20 MG DR capsule, Take 20 mg by mouth daily, Disp: , Rfl:      oxyCODONE (ROXICODONE) 5 MG tablet, Take 0.5-1 tablets (2.5-5 mg) by mouth every 4 hours as needed for severe pain ((pain rating 7-10)) 0.5 tablet for pain 4-6, 1 tablet pain 7-10, Disp: 30 tablet, Rfl: 0     Probiotic Product (PROBIOTIC-10 PO), Take 1 tablet by mouth daily, Disp: , Rfl:      senna-docusate (SENOKOT-S/PERICOLACE) 8.6-50 MG tablet, Take 1 tablet by mouth 2 times daily, Disp: , Rfl:      sodium bicarbonate 650 MG tablet, Take 1 tablet (650 mg) by mouth 2 times daily, Disp: 60 tablet, Rfl: 0  Allergies   Allergen Reactions     Statins-Hmg-Coa Reductase Inhibitors [Hmg-Coa-R Inhibitors] Muscle Pain (Myalgia)       All Meds and Allergies reviewed in the record at the facility and is the most up-to-date    REVIEW OF SYSTEMS:   Review of Systems  No fevers or chills. No headache, lightheadedness or dizziness. No SOB, chest pains or palpitations. Appetite is good. No nausea, vomiting, constipation or diarrhea. No dysuria, frequency, burning or pain with urination.  Pain control with Tylenol and oxycodone.  She does have some increased behaviors in the late evening with impulsiveness.  Crying on exam.  Otherwise review of systems are negative.     PHYSICAL EXAMINATION:  Physical Exam     Vital signs: /75   Pulse 69   Temp 96.8  F (36  C)   Resp 16   Wt 52.7 kg (116 lb 3.2 oz)   SpO2 98%   BMI 20.58 kg/m    General: Awake, Alert, oriented x1,  follows simple commands  HEENT: Pink conjunctiva, anicteric sclerae,  oral mucosa  NECK: Supple, without any  lymphadenopathy, or masses  CVS:  S1  S2, without murmur or gallop.   LUNG: Clear to auscultation, No wheezes, rales or rhonci.  BACK: No kyphosis of the thoracic spine  ABDOMEN: Soft, thin, nontender to palpation, with positive bowel sounds  EXTREMITIES: Good range of motion on both upper and lower extremities, no pedal edema, no calf tenderness  SKIN: Tegaderm to left hip dry and intact.  NEUROLOGIC: Intact, pulses palpable  PSYCHIATRIC: Cognitive impairment noted.  Recent slums 10 out of 30.  Patient tearful on exam today.  Unable to consult.      Labs:  All labs reviewed in the nursing home record and [facility]   @  Lab Results   Component Value Date    WBC 9.1 02/17/2022     Lab Results   Component Value Date    RBC 3.44 02/17/2022     Lab Results   Component Value Date    HGB 8.4 02/20/2022     Lab Results   Component Value Date    HCT 33.4 02/17/2022     Lab Results   Component Value Date    MCV 97 02/17/2022     Lab Results   Component Value Date    MCH 31.1 02/17/2022     Lab Results   Component Value Date    MCHC 32.0 02/17/2022     Lab Results   Component Value Date    RDW 15.5 02/17/2022     Lab Results   Component Value Date     02/20/2022        @Last Comprehensive Metabolic Panel:  Sodium   Date Value Ref Range Status   03/03/2022 140 136 - 145 mmol/L Final     Potassium   Date Value Ref Range Status   03/03/2022 4.9 3.5 - 5.0 mmol/L Final     Chloride   Date Value Ref Range Status   03/03/2022 110 (H) 98 - 107 mmol/L Final     Carbon Dioxide (CO2)   Date Value Ref Range Status   03/03/2022 19 (L) 22 - 31 mmol/L Final     Anion Gap   Date Value Ref Range Status   03/03/2022 11 5 - 18 mmol/L Final     Glucose   Date Value Ref Range Status   03/03/2022 89 70 - 125 mg/dL Final     Urea Nitrogen   Date Value Ref Range Status   03/03/2022 32 (H) 8 - 28 mg/dL Final     Creatinine   Date Value Ref Range Status   03/03/2022 1.73 (H) 0.60 - 1.10 mg/dL Final     GFR Estimate   Date Value Ref Range Status    03/03/2022 29 (L) >60 mL/min/1.73m2 Final     Comment:     Effective December 21, 2021 eGFRcr in adults is calculated using the 2021 CKD-EPI creatinine equation which includes age and gender (Aydin et al., NEJM, DOI: 10.1056/MKRIzo9963059)   07/09/2021 25 (L) >60 mL/min/1.73m2 Final     Calcium   Date Value Ref Range Status   03/03/2022 10.7 (H) 8.5 - 10.5 mg/dL Final         Assessment/Plan:    ICD-10-CM    1. Left displaced femoral neck fracture (H)  S72.002A  Continues in therapy.    2. Status post open reduction and internal fixation (ORIF) of fracture  Z98.890  continue with Tylenol and oxycodone for pain.  Will attempt to wean off oxycodone secondary to underlying cognitive impairment.    Z87.81    3. Cognitive impairment  R41.89  increased behaviors in the evening.  Decrease Seroquel to 12.5 nightly.  Daughter is okay with resuming 12 point 5 in the AM if behaviors continue.        4. Chronic kidney disease, stage IV (severe) (H)  N18.4  creatinine 1.73.  Baseline 1.7-2.2.   Encourage fluids.    5. Metabolic acidosis  E87.2  due to chronic kidney disease.  Continues on sodium bicarb. Continue to monitor.    6. Anxiety with depression  F41.8  continues on bupropion.  Remeron started during hospitalization.   Okay to DC per daughter given her history of SIADH and low sodium.    7. Anemia due to blood loss, acute  D62  hemoglobin now 10.0 up from 8.7.    8.   Thrombocytosis   noted elevated platelet count.  Platelet 632,000.  No evidence of bleed.  Rule out infection.  No elevated white count.  But obtain UA UC.  If negative.  Will obtain peripheral smear.           This note has been dictated using voice recognition software. Any grammatical or context distortions are unintentional and inherent to the software    Electronically signed by: Shahnaz Martinez, CNP

## 2022-03-07 PROBLEM — D62 ANEMIA DUE TO BLOOD LOSS, ACUTE: Status: ACTIVE | Noted: 2022-01-01

## 2022-03-07 PROBLEM — Z86.39 HISTORY OF SIADH: Status: ACTIVE | Noted: 2022-01-01

## 2022-03-08 NOTE — PROGRESS NOTES
[facility] GERIATRIC SERVICES    Facility:  [facility] ([facility]) [[id number]]  Code Status: FULL CODE      CHIEF COMPLAINT/REASON FOR VISIT:  Chief Complaint   Patient presents with     RECHECK       HISTORY:      HPI: Holli is a 86 year old female who resides here at the [facility] undergoing physical occupational therapy recent hospitalization on 2/16/2022 with an acute left femoral neck fracture.  She also has hypertension chronic kidney disease stage IV and elevated brain natruretic peptide but no real weight gain.  She does have a severe anxiety at this time and had a history of hyponatremia.  She underwent hemiarthroplasty on 2/16/2022 and her baseline creatinine is 1.7-2.2.  She is on diltiazem for blood pressure and as needed hydralazine.  She did have metabolic acidosis secondary to chronic kidney disease on bicarb twice daily.  Acute blood loss anemia was noted hemoglobin has remained stable.    Issue today is elevated brain atretic peptide from 9/22/2021 was 478 now it is in the 900 range.  She does have chronic kidney disease which could contribute to that elevation but she does not have any atrial fibrillation.  She does not appear to be fluid overloaded at this time per numbers and she likely will need to see cardiology.  Patient does have ischemic ST-T changes in the lateral leads but asymptomatic.  However it is a normal sinus rhythm.  On the EKG.    Patient is feeling okay and is has no new concerns she is not short of breath she has trace edema bilateral.  But no concerns for fluid overload weight is 116 today which is about the range she has been here since she has been here.        Past Medical History:   Diagnosis Date     CHF (congestive heart failure) (H)      CKD (chronic kidney disease)      Dementia (H)      Depressive disorder      HLD (hyperlipidemia)      Hypertension              No family history on file.  Social History     Socioeconomic History     Marital status:       Spouse name: Not on file     Number of children: Not on file     Years of education: Not on file     Highest education level: Not on file   Occupational History     Not on file   Tobacco Use     Smoking status: Unknown If Ever Smoked     Smokeless tobacco: Never Used   Substance and Sexual Activity     Alcohol use: Not on file     Drug use: Not on file     Sexual activity: Not on file   Other Topics Concern     Not on file   Social History Narrative     Not on file     Social Determinants of Health     Financial Resource Strain: Not on file   Food Insecurity: Not on file   Transportation Needs: Not on file   Physical Activity: Not on file   Stress: Not on file   Social Connections: Not on file   Intimate Partner Violence: Not on file   Housing Stability: Not on file         REVIEW OF SYSTEM: Patient denies any pain fevers chills nausea vomit diarrhea change in vision hearing taste or smell weakness one-sided chest pain shortness of breath.  Denies any congeners stool polyphagia polydipsia polyuria depression or anxiety in the remainder the review of systems is negative.      PHYSICAL EXAM: Patient is alert pleasant does not appear to be in acute distress head is normocephalic and atraumatic sclera conjunctiva is clear mucosas moist nasal discharge.  Heart sounds are regular lungs clear abdomen was soft nontender.  Extremities show trace edema bilateral.  Neurologic David nonfocal and affect was pleasant.        LABS: Brain natruretic peptide drawn yesterday was 947.  Comparison looking back through the charts and 9/22/2020 and 1 brain natruretic peptide was 478.    On 3/3/2022 basic metabolic profile is as follows sodium is 140, potassium is 4.9, chloride 110, CO2 is 19, anion gap was 11, BUN was 32, creatinine 1.73 which is about baseline, calcium was 10.7, glucose 89, GFR was 29.  White count was 10.1, hemoglobin was 10.0, platelets were 632,000.    Vitals; blood pressure 154/85    Pulse 59    Temperature is  97.3    Respirations 15    O2 sats 97%.    Weights have gone from 118 down to 117 and now down to 114.1.  Last creatinine in the hospital was 1.78.      ASSESSMENT:   Encounter Diagnoses   Name Primary?     Left displaced femoral neck fracture (H) Yes     Status post open reduction and internal fixation (ORIF) of fracture      Anxiety with depression      Cognitive impairment      Chronic kidney disease, stage IV (severe) (H)      Metabolic acidosis      History of SIADH      Anemia due to blood loss, acute      Hip fracture, left, closed, initial encounter (H)      Fall, initial encounter         PLAN: The brain natruretic peptide that has gone up she does not have any signs of acute fluid overload at this time.    The elevation could be due to worsening kidney disease although its been stable.    Would recommend she does see cardiology outpatient at some point here.  But does not seem to be overtly fluid overloaded at this time.    I will continue to monitor above medical problems and no other changes to care plan at this time.        Electronically signed by: Chano Heredia

## 2022-03-10 NOTE — PROGRESS NOTES
[facility] GERIATRIC SERVICES    Facility:  [facility] ([facility]) [[id number]]  Code Status: FULL CODE      CHIEF COMPLAINT/REASON FOR VISIT:  Chief Complaint   Patient presents with     Discharge Summary Nursing Home       HISTORY:      HPI: Holli is a 86 year old female who resides here in the TCU at [facility] doing physical occupational therapy for left femoral neck fracture which she was hospitalized 2/16/2022.  She does have hypertension chronic kidney disease for an elevated brain metric peptide she has no real weight gain at this time and there is no real signs of any acute fluid overload EKG showed some normal variance with elevated ST segments at that time however she was asymptomatic.  She does have chronic kidney disease stage IV which could contribute greatly to the elevated brain natruretic peptide.  She is on as needed hydralazine for elevated blood pressures.  And her weight has been stable at 115.4.  Down from 116 but highest here was about 120.    Patient is doing well and does not have any chest pain or shortness of breath.  There is no real signs of edema and weight has been stable.  She also is doing well.  And seems to be mood is pretty good at this time.  She denies any other issues.    Past Medical History:   Diagnosis Date     CHF (congestive heart failure) (H)      CKD (chronic kidney disease)      Dementia (H)      Depressive disorder      HLD (hyperlipidemia)      Hypertension              No family history on file.  Social History     Socioeconomic History     Marital status:      Spouse name: Not on file     Number of children: Not on file     Years of education: Not on file     Highest education level: Not on file   Occupational History     Not on file   Tobacco Use     Smoking status: Unknown If Ever Smoked     Smokeless tobacco: Never Used   Substance and Sexual Activity     Alcohol use: Not on file     Drug use: Not on file     Sexual activity: Not on file    Other Topics Concern     Not on file   Social History Narrative     Not on file     Social Determinants of Health     Financial Resource Strain: Not on file   Food Insecurity: Not on file   Transportation Needs: Not on file   Physical Activity: Not on file   Stress: Not on file   Social Connections: Not on file   Intimate Partner Violence: Not on file   Housing Stability: Not on file         REVIEW OF SYSTEM: Patient claims her pain is under adequate control with medications denies any fevers chills nausea vomit diarrhea change in vision hearing taste or smell weakness one-sided chest pain shortness of breath.  Denies incontinent of stool polyphagia polydipsia polyuria depression or anxiety and the remainder review of systems is negative.      PHYSICAL EXAM: Patient is alert pleasant not appear to be congested head is normocephalic and atraumatic sclera conjunctiva is clear oromucosa is moist nasal discharge.  Heart sounds are regular lungs are clear abdomen was soft nontender.  Extremities did not show any cyanosis or edema neuro exam was nonfocal and affect was pleasant.      LABS: Brain natruretic peptide was 947 2 days ago.  That is an increase.  EKG showed normal sinus rhythm slight elevation of ST segments in V3 V2 however patient is asymptomatic.    Vitals; blood pressure 154/76    Pulse 76    Temperature is 97.7    Respirations 16    O2 sats 96%.        MEDICAL EQUIPMENT NEEDS:      DISCHARGE PLAN/FACE TO FACE:  I certify that services are/were furnished while this patient was under the care of a physician and that a physician or an allowed non-physician practitioner (NPP), had a face-to-face encounter that meets the physician face-to-face encounter requirements. The encounter was in whole, or in part, related to the primary reason for home health. The patient is confined to his/her home and needs intermittent skilled nursing, physical therapy, speech-language pathology, or the continued need for  occupational therapy. A plan of care has been established by a physician and is periodically reviewed by a physician.  Date of Face-to-Face Encounter:     I certify that, based on my findings, the following services are medically necessary home health services:     My clinical findings support the need for the above skilled services because: (Please write a brief narrative summary that describes what the RN, PT, SLP, or other services will be doing in the home. A list of diagnoses in this section does not meet the CMS requirements.)     This patient is homebound because: (Please write a brief narrative summary describing the functional limitations as to why this patient is homebound and specifically what makes this patient homebound.)     The patient is, or has been, under my care and I have initiated the establishment of the plan of care. This patient will be followed by a physician who will periodically review the plan of care.    Schedule follow up visit with primary care provider within 7 days to reestablish care.    ASSESSMENT:   Encounter Diagnoses   Name Primary?     Left displaced femoral neck fracture (H) Yes     Status post open reduction and internal fixation (ORIF) of fracture      Anxiety with depression      Chronic kidney disease, stage IV (severe) (H)      Elevated brain natriuretic peptide (BNP) level      Cognitive impairment         PLAN: Plan at this time will discharge to the [facility] with current meds and treatments.  Okay for PT OT evaluate and treat and patient will need to follow-up with a physician in 1 week.    Did have a long discussion about the brain metric peptide she will need to follow-up on this with cardiology and they are going to do that.    I will continue to monitor above medical problems no other changes to care plan at this time.  Care plan was reviewed and is appropriate.    38 minutes was spent on this discharge greater than for percent time dedicated to coordination of this  including discussing discharge planning and coordination of care with patient and her daughter are both in agreement with plan.        Electronically signed by: CLAUDIA CRISTINA DO

## 2022-03-16 NOTE — PROGRESS NOTES
[facility] GERIATRIC SERVICES    Code Status:  FULL CODE   Visit Type:   Chief Complaint   Patient presents with     Discharge Summary Nursing Home     Facility:  [facility] ([facility]) [[id number]]           History of Present Illness: Holli Richardson is a 86 year old female who I am seeing today for discharged from the TCU.  Patient recently hospitalized on 2/16/2022 secondary to fall with acute left femoral neck fracture.  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  Patient found to have acute displaced left femoral neck fracture and underwent a bipolar hemiarthroplasty on 2/16/2022.  She did have some acute urinary retention and a [place] catheter was placed.  This was removed prior to discharge and she was voiding adequately.  Patient with underlying chronic kidney disease stage IV.  Creatinine 1.68.  Her baseline is 1.7-2.2.  Hypertension.  She continues on diltiazem and as needed hydralazine.  Hyperlipidemia on gemfibrozil.  GERD on PPI.  Metabolic acidosis secondary to CKD on bicarb twice daily.  Cognitive impairment.  Acute blood loss anemia.  Hemoglobin remained stable.  Constipation.  She was started on lactulose 20 g twice daily.  Moderate protein calorie malnutrition.  She was started on Remeron.  Underlying depression with anxiety.  She does take bupropion.  She was treated with Seroquel during her hospitalization however she did become drowsy and this was changed to as needed and Remeron was started.  Head CT negative.    Today patient sitting up in bedside chair.  Recent right hip fracture with ORIF. Pain controlled with tylenol.  She is using very little oxycodone.  I will discontinue.  Patient with underlying cognitive impairment.  She has had some ongoing sundowning and anxiety.  She continues on bupropion.  Seroquel 12.5 at bedtime.  Symptoms improved with this.  She was previously on Remeron which was  just started during hospitalization.  She does have underlying SIADH and continues on sodium bicarb.  Remeron discontinued during her TCU stay.  No increase symptomology.  Sodium has remained within normal limits.  Currently 140.  Creatinine at baseline 1.73.  GERD on PPI. Appetite fair. She is on supplement. Metabolic acidosis due to CKD on bicarb.       Active Ambulatory Problems     Diagnosis Date Noted     Chronic kidney disease, stage IV (severe) (H) 09/22/2021     Hyperlipidemia 09/22/2021     Major depressive disorder, single episode, moderate (H) 09/22/2021     Rib fracture 07/09/2019     Senile osteoporosis 09/22/2021     Hyponatremia 09/22/2021     KAM (acute kidney injury) (H) 09/22/2021     Acute on chronic congestive heart failure, unspecified heart failure type (H) 09/22/2021     Acute on chronic diastolic heart failure (H) 02/16/2022     Dementia (H) 02/16/2022     Heart disease 02/16/2022     Fall, initial encounter 02/16/2022     Hip fracture, left, closed, initial encounter (H) 02/16/2022     Acute urinary retention 02/17/2022     Acute renal failure syndrome (H) 02/23/2022     Anxiety with depression 02/23/2022     Hypercalcemia 10/07/2021     Insomnia 02/23/2022     Other specified disorders of kidney and ureter 02/23/2022     Shoulder joint pain 02/23/2022     Slow transit constipation 02/23/2022     Status post open reduction and internal fixation (ORIF) of fracture 03/01/2022     Cognitive impairment 03/01/2022     Metabolic acidosis 03/01/2022     History of SIADH 03/07/2022     Anemia due to blood loss, acute 03/07/2022     Resolved Ambulatory Problems     Diagnosis Date Noted     No Resolved Ambulatory Problems     Past Medical History:   Diagnosis Date     CHF (congestive heart failure) (H)      CKD (chronic kidney disease)      Depressive disorder      HLD (hyperlipidemia)      Hypertension        Current Outpatient Medications:      acetaminophen (TYLENOL) 325 MG tablet, Take 975 mg by  mouth 3 times daily, Disp: , Rfl:      aspirin (ASA) 81 MG EC tablet, Take 1 tablet (81 mg) by mouth two times daily for 28 days, Disp: 56 tablet, Rfl: 0     bisacodyl (DULCOLAX) 10 MG suppository, Place 1 suppository (10 mg) rectally daily as needed for constipation (Use if Magnesium hydroxide (MILK of MAGNESIA) not effective after 24 hours. May discontinue if patient having bowel movement.), Disp: , Rfl:      buPROPion (WELLBUTRIN XL) 150 MG 24 hr tablet, Take 150 mg by mouth daily, Disp: , Rfl:      cholecalciferol 50 MCG (2000 UT) tablet, Take 2,000 Units by mouth daily, Disp: , Rfl:      diltiazem ER COATED BEADS (CARDIZEM CD/CARTIA XT) 120 MG 24 hr capsule, Take 120 mg by mouth daily, Disp: , Rfl:      dorzolamide-timolol PF (COSOPT) 22.3-6.8 MG/ML opthalmic solution, Place 1 drop Into the left eye 2 times daily, Disp: , Rfl:      gemfibroziL (LOPID) 600 MG tablet, [GEMFIBROZIL (LOPID) 600 MG TABLET] Take 600 mg by mouth 2 (two) times a day before meals., Disp: , Rfl:      latanoprost (XALATAN) 0.005 % ophthalmic solution, Place 1 drop into both eyes At Bedtime , Disp: , Rfl:      melatonin 3 MG tablet, Take 1 tablet (3 mg) by mouth At Bedtime, Disp: 30 tablet, Rfl: 0     multivitamin  with lutein (OCUVITE WITH LUTEIN) CAPS per capsule, Take 1 capsule by mouth daily, Disp: , Rfl:      omeprazole (PRILOSEC) 20 MG DR capsule, Take 20 mg by mouth daily, Disp: , Rfl:      Probiotic Product (PROBIOTIC-10 PO), Take 1 tablet by mouth daily, Disp: , Rfl:      QUEtiapine (SEROQUEL) 25 MG tablet, Take 25 mg by mouth At Bedtime Give half tab to equal 12.5 mg nightly., Disp: , Rfl:      senna-docusate (SENOKOT-S/PERICOLACE) 8.6-50 MG tablet, Take 1 tablet by mouth 2 times daily, Disp: , Rfl:      sodium bicarbonate 650 MG tablet, Take 1 tablet (650 mg) by mouth 2 times daily, Disp: 60 tablet, Rfl: 0  Allergies   Allergen Reactions     Statins-Hmg-Coa Reductase Inhibitors [Hmg-Coa-R Inhibitors] Muscle Pain (Myalgia)  "      All Meds and Allergies reviewed in the record at the facility and is the most up-to-date    REVIEW OF SYSTEMS:   Review of Systems  No fevers or chills. No headache, lightheadedness or dizziness. No SOB, chest pains or palpitations. Appetite is good. No nausea, vomiting, constipation or diarrhea. No dysuria, frequency, burning or pain with urination.  Pain control with Tylenol and occasional oxycodone.  She does have some increased behaviors in the late evening with impulsiveness.  History of anxiety.  Otherwise review of systems are negative.     PHYSICAL EXAMINATION:  Physical Exam     Vital signs: BP (!) 154/78   Pulse 79   Temp 97  F (36.1  C)   Resp 15   Ht 1.6 m (5' 3\")   Wt 49.9 kg (110 lb)   SpO2 99%   BMI 19.49 kg/m    General: Awake, Alert, oriented x1,  follows simple commands  HEENT: Pink conjunctiva, anicteric sclerae,  oral mucosa  NECK: Supple, without any lymphadenopathy, or masses  CVS:  S1  S2, without murmur or gallop.   LUNG: Clear to auscultation, No wheezes, rales or rhonci.  BACK: No kyphosis of the thoracic spine  ABDOMEN: Soft, thin, nontender to palpation, with positive bowel sounds  EXTREMITIES: Good range of motion on both upper and lower extremities, no pedal edema, no calf tenderness  SKIN: Tegaderm to left hip dry and intact.  NEUROLOGIC: Intact, pulses palpable  PSYCHIATRIC: Cognitive impairment noted.  Recent slums 10 out of 30.  Flat affect.      Labs:  All labs reviewed in the nursing home record and [facility]   @  Lab Results   Component Value Date    WBC 9.1 02/17/2022     Lab Results   Component Value Date    RBC 3.44 02/17/2022     Lab Results   Component Value Date    HGB 8.4 02/20/2022     Lab Results   Component Value Date    HCT 33.4 02/17/2022     Lab Results   Component Value Date    MCV 97 02/17/2022     Lab Results   Component Value Date    MCH 31.1 02/17/2022     Lab Results   Component Value Date    MCHC 32.0 02/17/2022     Lab Results   Component Value Date "    RDW 15.5 02/17/2022     Lab Results   Component Value Date     02/20/2022        @Last Comprehensive Metabolic Panel:  Sodium   Date Value Ref Range Status   03/03/2022 140 136 - 145 mmol/L Final     Potassium   Date Value Ref Range Status   03/03/2022 4.9 3.5 - 5.0 mmol/L Final     Chloride   Date Value Ref Range Status   03/03/2022 110 (H) 98 - 107 mmol/L Final     Carbon Dioxide (CO2)   Date Value Ref Range Status   03/03/2022 19 (L) 22 - 31 mmol/L Final     Anion Gap   Date Value Ref Range Status   03/03/2022 11 5 - 18 mmol/L Final     Glucose   Date Value Ref Range Status   03/03/2022 89 70 - 125 mg/dL Final     Urea Nitrogen   Date Value Ref Range Status   03/03/2022 32 (H) 8 - 28 mg/dL Final     Creatinine   Date Value Ref Range Status   03/03/2022 1.73 (H) 0.60 - 1.10 mg/dL Final     GFR Estimate   Date Value Ref Range Status   03/03/2022 29 (L) >60 mL/min/1.73m2 Final     Comment:     Effective December 21, 2021 eGFRcr in adults is calculated using the 2021 CKD-EPI creatinine equation which includes age and gender (Aydin et al., NEJ, DOI: 10.1056/BQTVax8626276)   07/09/2021 25 (L) >60 mL/min/1.73m2 Final     Calcium   Date Value Ref Range Status   03/03/2022 10.7 (H) 8.5 - 10.5 mg/dL Final         Assessment/Plan:    ICD-10-CM    1. Left displaced femoral neck fracture (H)  S72.002A     2. Status post open reduction and internal fixation (ORIF) of fracture  Z98.890  continue with Tylenol.  Patient using very little oxycodone.  Discontinue.     Z87.81    3. Cognitive impairment  R41.89  increased behaviors in the evening with sundowning.  Continue Seroquel to 12.5 nightly.      4. Chronic kidney disease, stage IV (severe) (H)  N18.4  creatinine 1.73.  Baseline 1.7-2.2.   Encourage fluids.    5. Metabolic acidosis  E87.2  due to chronic kidney disease.  Continues on sodium bicarb. Continue to monitor.    6. Anxiety with depression  F41.8  continues on bupropion.  Remeron started during  hospitalization.   Okay to DC per daughter given her history of SIADH and low sodium.   No increase symptomology with discontinuation.   7. Anemia due to blood loss, acute  D62  hemoglobin now 10.0 up from 8.7.    8.   Thrombocytosis   noted elevated platelet count.  Platelet 632,000.  No evidence of bleed.  Infection ruled out.  Repeat CBC in 1 week.       [facility].  Home PT, OT, home health aide and RN for medication management.  Follow-up with primary care provider in 1 week.  Follow-up outpatient with Ortho.    DISCHARGE PLAN/FACE TO FACE:  I certify that this patient is under my care and that I, or a nurse practitioner or physician's assistant working with me, had a face-to-face encounter that meets the physician face-to-face encounter requirements with this patient.       I certify that, based on my findings, the following services are medically necessary home health services.    My clinical findings support the need for the above skilled services.    This patient is homebound because: Recent fall with femur fracture status post ORIF.    The patient is, or has been, under my care and I have initiated the establishment of the plan of care. This patient will be followed by a physician who will periodically review the plan of care.    Total time spent for this visit was 35 minutes with greater than 50% time spent face-to-face patient reviewing discharge instructions including medications, home care services and follow-ups as well as collaborating with nursing staff and .        This note has been dictated using voice recognition software. Any grammatical or context distortions are unintentional and inherent to the software    Electronically signed by: Shahnaz Martinez CNP

## 2022-10-20 PROBLEM — R06.02 SOB (SHORTNESS OF BREATH): Status: ACTIVE | Noted: 2022-01-01

## 2022-10-20 PROBLEM — I50.9 ACUTE ON CHRONIC CONGESTIVE HEART FAILURE, UNSPECIFIED HEART FAILURE TYPE (H): Status: RESOLVED | Noted: 2021-09-22 | Resolved: 2022-01-01

## 2022-10-20 NOTE — ED NOTES
Patient put on call light reports she has a headache and would like something.  Pulled shade in room as it is very bright from the sun shine.  Primary RN notified.

## 2022-10-20 NOTE — ED PROVIDER NOTES
EMERGENCY DEPARTMENT ENCOUNTER      NAME: Holli Richardson  AGE: 86 year old female  YOB: 1935  MRN: [id number]  EVALUATION DATE & TIME: 10/19/2022 10:34 PM    PCP: Everardo English    ED PROVIDER: Jeanie Mcmillan M.D.      Chief Complaint   Patient presents with     Shortness of Breath         FINAL IMPRESSION:  1. Acute on chronic congestive heart failure, unspecified heart failure type (H)    2. SOB (shortness of breath)        MEDICAL DECISION MAKING:    Pertinent Labs & Imaging studies reviewed. (See chart for details)  ED Course as of 10/20/22 0055   Wed Oct 19, 2022   2331 Afebrile.  Vital signs here with some significant hypertension, otherwise unremarkable.  Patient is coming in for evaluation of shortness of breath.  Brought in by EMS transport from assisted living.  Short of breath throughout the day today.  Not currently on any oxygen, is not on oxygen at baseline.  Does have a history of congestive heart failure, COPD.  She does not report any swelling.  No chest pain associated with this, mostly is just increased work of breathing and feeling of shortness of breath with any exertion.   2331 Physical exam for patient here with mild increased work of breathing, mild respiratory distress.  Sitting upright, pursed lip breathing noted.  Heart regular rate and rhythm, abdomen soft nontender.  No lower extremity edema.  Slight JVD noted to the level of the angle of the mandible.  Breath sounds are diminished bilaterally with slight crackles noted at bilateral bases.    Patient here was taken to the restroom in a wheelchair, in just the walk from the toilet back to the wheelchair she was acutely short of breath and found to have oxygen saturations at 90%.  Took her approximately 15 minutes to recuperate from this.  She does have a history according to the patient of COPD, CHF.  I am only seeing a history of CHF.  He does have decreased breath sounds bilaterally with diminished breath  sounds noted.  Given DuoNeb, Solu-Medrol.  Will check labs for patient here, believe she will need to be admitted as she is currently living in assisted living, significantly short of breath with any activity.  No chest pain or pleuritic pain.  No unilateral leg swelling.  No tachycardia noted.  Hypertension resolved on its own down to 150s over 80.       Labs to come back here, creatinine elevated from baseline, troponin elevated with no chest pain at this time, EKG is nonischemic.  BNP elevated as well.    EKG here shows sinus rhythm with sinus arrhythmia with some PVCs.  There is some significant ST elevation seen in V1 and V2 but this is unchanged from previous EKG.  She does have some T wave inversions and some downsloping ST segments in V4 through V6.  She had these previously well however V4 is worsened than previous.  Normal axis.    Could give her a small amount of Lasix here, small amount secondary to the fact that she does have kidney function and is elevated.  She is comfortable when she is lying in bed not requiring any supplemental oxygen.  Looks comfortable at this time.  Plan for admission for further work-up.      Medical Decision Making    Supplemental history from: N/A    External Record(s) Reviewed: Inpatient Record and Outpatient Record    Differential Diagnosis: See MDM charting for differential considered.     I performed an independent interpretation of the: EKG: See my documentation.    Discussed with radiology regarding test interpretation: N/A    Discussion of management with another provider: Hospitalist    The following testing was considered but ultimately not selected: None     I considered prescription management with: Symptomatic Management    The patient's care impacted: Chronic Kidney Disease, Coronary Heart Disease, Dementia and Hypertension    Consideration of Admission/Observation: N/A - Patient admitted or discharged without consideration for admission    Care significantly  affected by Social Determinants of Health including: N/A       Critical care: 0 minutes excluding separately billable procedures.  Includes bedside management, time reviewing test results, review of records, discussing the case with staff, documenting the medical record and time spent with family members (or surrogate decision makers) discussing specific treatment issues.          ED COURSE:  11:09 PM I met with the patient, obtained history, performed an initial exam, and discussed options and plan for diagnostics and treatment here in the ED.     The importance of close follow up was discussed. We reviewed warning signs and symptoms, and I instructed Ms. Richardson to return to the emergency department immediately if she develops any new or worsening symptoms. I provided additional verbal discharge instructions. Ms. Richardson expressed understanding and agreement with this plan of care, her questions were answered, and she was discharged in stable condition.     MEDICATIONS GIVEN IN THE EMERGENCY:  Medications   furosemide (LASIX) injection 20 mg (has no administration in time range)   ipratropium - albuterol 0.5 mg/2.5 mg/3 mL (DUONEB) neb solution 3 mL (3 mLs Nebulization Given 10/20/22 0003)   methylPREDNISolone sodium succinate (solu-MEDROL) injection 125 mg (125 mg Intravenous Given 10/20/22 0010)       NEW PRESCRIPTIONS STARTED AT TODAY'S ER VISIT:  New Prescriptions    No medications on file          =================================================================    HPI    Patient information was obtained from: Patient and Nursing Staff    Use of : N/A       HPI is limited secondary to dementia.     Holli Richardson is a 86 year old female who presents with shortness of breath. She has a history of COPD, CHF and dementia, here BIB EMS from assisted living facility.Today nursing staff got patient up to use the bathroom. Patient was found to be profoundly short of breath with oxygen saturation in  the 90's when she moved from the wheelchair to the toilet and back to the wheelchair.       REVIEW OF SYSTEMS   Review of Systems   Unable to perform ROS: Dementia         PAST MEDICAL HISTORY:  Past Medical History:   Diagnosis Date     CHF (congestive heart failure) (H)      CKD (chronic kidney disease)      Dementia (H)      Depressive disorder      HLD (hyperlipidemia)      Hypertension        PAST SURGICAL HISTORY:  Past Surgical History:   Procedure Laterality Date     OPEN REDUCTION INTERNAL FIXATION HIP BIPOLAR Left 2/17/2022    Procedure: HEMIARTHROPLASTY, HIP, BIPOLAR;  Surgeon: Micah Marvin MD;  Location: [facility]     PICC DOUBLE LUMEN PLACEMENT  9/22/2021            CURRENT MEDICATIONS:      Current Facility-Administered Medications:      furosemide (LASIX) injection 20 mg, 20 mg, Intravenous, Once, Jeanie Mcmillan MD    Current Outpatient Medications:      acetaminophen (TYLENOL) 325 MG tablet, Take 975 mg by mouth 3 times daily, Disp: , Rfl:      buPROPion (WELLBUTRIN XL) 150 MG 24 hr tablet, Take 150 mg by mouth daily, Disp: , Rfl:      cholecalciferol 50 MCG (2000 UT) tablet, Take 2,000 Units by mouth daily, Disp: , Rfl:      diltiazem ER (CARDIZEM SR) 90 MG 12 hr capsule, Take 90 mg by mouth daily, Disp: , Rfl:      dorzolamide-timolol PF (COSOPT PF) 2-0.5 % opthalmic solutionh, INSTILL 1 DROP INTO THE LEFT EYE TWICE DAILY, Disp: 15 each, Rfl: 0     gemfibroziL (LOPID) 600 MG tablet, [GEMFIBROZIL (LOPID) 600 MG TABLET] Take 600 mg by mouth 2 (two) times a day before meals., Disp: , Rfl:      latanoprost (XALATAN) 0.005 % ophthalmic solution, Place 1 drop into both eyes At Bedtime , Disp: , Rfl:      mirtazapine (REMERON) 7.5 MG tablet, Take 7.5 mg by mouth At Bedtime, Disp: , Rfl:      multivitamin  with lutein (OCUVITE WITH LUTEIN) CAPS per capsule, Take 1 capsule by mouth daily, Disp: , Rfl:      omeprazole (PRILOSEC) 20 MG DR capsule, Take 20 mg by mouth daily, Disp: , Rfl:       "Probiotic Product (PROBIOTIC-10 PO), Take 1 tablet by mouth daily, Disp: , Rfl:      QUEtiapine (SEROQUEL) 25 MG tablet, Take 12.5 mg by mouth At Bedtime, Disp: , Rfl:      senna-docusate (SENOKOT-S/PERICOLACE) 8.6-50 MG tablet, Take 1 tablet by mouth 2 times daily, Disp: , Rfl:      sodium bicarbonate 650 MG tablet, Take 1 tablet (650 mg) by mouth 2 times daily, Disp: 60 tablet, Rfl: 0    ALLERGIES:  Allergies   Allergen Reactions     Statins-Hmg-Coa Reductase Inhibitors [Hmg-Coa-R Inhibitors] Muscle Pain (Myalgia)       FAMILY HISTORY:  No family history on file.    SOCIAL HISTORY:   Social History     Socioeconomic History     Marital status:    Tobacco Use     Smoking status: Unknown     Smokeless tobacco: Never       PHYSICAL EXAM:    Vitals: BP (!) 156/82   Pulse 76   Temp 97.7  F (36.5  C) (Oral)   Resp 27   Ht 1.6 m (5' 3\")   Wt 49.9 kg (110 lb)   SpO2 98%   BMI 19.49 kg/m     General:. Alert and interactive.   HENT: Oropharynx without erythema or exudates. MMM.  TMs clear bilaterally.  Eyes: Pupils mid-sized and equally reactive.   Neck: Full AROM.  No midline tenderness to palpation. Slight JVD noted to the level of the angle of the mandible.    Cardiovascular: Regular rate and rhythm. Peripheral pulses 2+ bilaterally.  Chest/Pulmonary: Mild increased work of breathing, mild respiratory distress.  Sitting upright, pursed lip breathing noted. Breath sounds are diminished bilaterally with slight crackles noted at bilateral bases.  No chest wall tenderness or deformities.  Abdomen: Soft, nondistended. Nontender without guarding or rebound.  Back/Spine: No CVA or midline tenderness.  Extremities: Normal ROM of all major joints. No lower extremity edema.   Skin: Warm and dry. Normal skin color.   Neuro: Speech clear. CNs grossly intact. Moves all extremities appropriately. Strength and sensation grossly intact to all extremities.   Psych: Normal affect/mood, cooperative, memory appropriate.   "   LAB:  All pertinent labs reviewed and interpreted.  Labs Ordered and Resulted from Time of ED Arrival to Time of ED Departure   COMPREHENSIVE METABOLIC PANEL - Abnormal       Result Value    Sodium 141      Potassium 5.0      Chloride 109 (*)     Carbon Dioxide (CO2) 19 (*)     Anion Gap 13      Urea Nitrogen 34.5 (*)     Creatinine 2.09 (*)     Calcium 9.8      Glucose 98      Alkaline Phosphatase 118 (*)     AST 13      ALT 7 (*)     Protein Total 6.4      Albumin 4.2      Bilirubin Total <0.2      GFR Estimate 23 (*)    TROPONIN T, HIGH SENSITIVITY - Abnormal    Troponin T, High Sensitivity 33 (*)    NT PROBNP INPATIENT - Abnormal    N terminal Pro BNP Inpatient 2,522 (*)    CBC WITH PLATELETS AND DIFFERENTIAL - Abnormal    WBC Count 7.0      RBC Count 3.54 (*)     Hemoglobin 10.9 (*)     Hematocrit 34.8 (*)     MCV 98      MCH 30.8      MCHC 31.3 (*)     RDW 15.2 (*)     Platelet Count 254      % Neutrophils 55      % Lymphocytes 28      % Monocytes 10      % Eosinophils 6      % Basophils 1      % Immature Granulocytes 0      NRBCs per 100 WBC 0      Absolute Neutrophils 3.9      Absolute Lymphocytes 1.9      Absolute Monocytes 0.7      Absolute Eosinophils 0.4      Absolute Basophils 0.1      Absolute Immature Granulocytes 0.0      Absolute NRBCs 0.0     INFLUENZA A/B & SARS-COV2 PCR MULTIPLEX - Normal    Influenza A PCR Negative      Influenza B PCR Negative      RSV PCR Negative      SARS CoV2 PCR Negative         RADIOLOGY:  XR Chest Port 1 View   Final Result   IMPRESSION:    1. No convincing evidence of active cardiopulmonary disease.   2. Very large hiatal hernia again noted.           EKG:  See MDM  I have independently reviewed and interpreted the EKG(s) documented above.           I, Joesph Forrest, am serving as a scribe to document services personally performed by Dr. Jeanie Mcmillan  based on my observation and the provider's statements to me. I, Jeanie Mcmillan MD attest that Joesph Forrest is acting in  a scribe capacity, has observed my performance of the services and has documented them in accordance with my direction.      Jeanie Mcmillan M.D.  Emergency Medicine  [facility] EMERGENCY DEPARTMENT  [address] [place] [id number] [phone]  Dept: [phone]     Jeanie Mcmillan MD  10/20/22 0055

## 2022-10-20 NOTE — PLAN OF CARE
Goal Outcome Evaluation:  Heart Failure Care Map  GOALS TO BE MET BEFORE DISCHARGE:    1. Decrease congestion and/or edema with diuretic therapy to achieve near optimal volume status.     Dyspnea improved: No, further care required to meet this goal. Please explain dyspnea with ambulation to bathroom from bed   Edema improved: Yes, satisfactory for discharge.        Net I/O and Weights since admission:   09/20 2300 - 10/20 2259  In: 50 [P.O.:50]  Out: 1825 [Urine:1825]  Net: -1775     Vitals:    10/19/22 2247 10/20/22 1658   Weight: 49.9 kg (110 lb) 48.2 kg (106 lb 3.2 oz)       2.  O2 sats > 90% on room air, or at prior home O2 therapy level.      Able to wean O2 this shift to keep sats above 90%?: Yes, satisfactory for discharge.   Does patient use Home O2? No          Current oxygenation status:   SpO2: 95 %     O2 Device: None (Room air),      3.  Tolerates ambulation and mobility near baseline.     Ambulation: Yes, satisfactory for discharge.   Times patient ambulated with staff this shift: 1    Please review the Heart Failure Care Map for additional HF goal outcomes.    Janelle Subramanian RN  10/20/2022

## 2022-10-20 NOTE — ED NOTES
"ED Provider In Triage Note  [facility]  Encounter Date: Oct 19, 2022    Chief Complaint   Patient presents with     Shortness of Breath       Brief HPI:   Holli Richardson is a 86 year old female presenting to the Emergency Department with a chief complaint of shortness of breath, now resolved. She has a history of COPD, CHF and dementia, here BIB EMS from assisted living facility.    Brief Physical Exam:  BP (!) 200/91   Pulse 73   Temp 97.7  F (36.5  C) (Oral)   Resp 20   Ht 1.6 m (5' 3\")   Wt 49.9 kg (110 lb)   SpO2 98%   BMI 19.49 kg/m    General: Non-toxic appearing  HEENT: Atraumatic  Resp: No respiratory distress  Abdomen: Non-peritoneal  Neuro: Alert, oriented, answers questions appropriately  Psych: Behavior appropriate    Plan Initiated in Triage:  Orders Placed This Encounter     XR Chest Port 1 View     Comprehensive metabolic panel     Troponin T, High Sensitivity     Nt probnp inpatient (BNP)     Symptomatic; Unknown Influenza A/B & SARS-CoV2 (COVID-19) Virus PCR Multiplex       PIT Dispo:   Take directly back to main ED    Alycia Balderrama MD on 10/19/2022 at 10:50 PM    Patient was evaluated by the Physician in Triage due to a limitation of available rooms in the Emergency Department. A plan of care was discussed based on the information obtained on the initial evaluation and patient was consuled to return back to the Emergency Department lobby after this initial evalutaiton until results were obtained or a room became available in the Emergency Department. Patient was counseled not to leave prior to receiving the results of their workup.     Alycia Balderrama MD  [facility] EMERGENCY DEPARTMENT  [address] [place] [id number]  [phone]     Alycia Balderrama MD  10/19/22 2251    "

## 2022-10-20 NOTE — H&P
Admission History and Physical   Hollimelissa Richardson,  1935, MRN [id number]      SOB (shortness of breath) [R06.02]    PCP: Everardo English, [unfilled], [phone]   Code status:  Prior       Extended Emergency Contact Information  Primary Emergency Contact: Annia Osullivan  Mobile Phone: [phone]  Relation: Daughter  Secondary Emergency Contact: CLAUIDA RICHARDSON  Home Phone: [phone]  Mobile Phone: [phone]  Relation: Son       Assessment and Plan     Assessment:  86-year-old with history of hypertension, diastolic dysfunction, dementia presented with sudden onset shortness of breath.  proBNP is elevated.  Received IV Lasix, steroid, and nebs in the ER.  We will continue IV Lasix for now.  Ordered limited echocardiogram.    Acute exacerbation of HFpEF  -- Echocardiogram from 2022 reviewed  -- Will order IV Lasix 40 mg twice daily with holding parameters  -- Order low-salt diet  -- Does not appear to be in COPD exacerbation given absence of wheezing    Hypertension suboptimal control  -- Anticipate improvement with IV Lasix    CKD 4  -Baseline creatinine ranging from 1.7-2.0  -- Anticipate slightly worsening with diuresis  -- Hold PTA oral sodium bicarbonate until patient is euvolemic to restrict sodium intake.    Mild cognitive impairment  -- Continue home medications  -- Monitor urinary retention    Large hiatal hernia  -- Continue PPI    Plan:    Pt would be admitted as inpatient and will likely stay for greater than 2 midnights due to need for IV Lasix.    Precautions: Fall    Nutrition: 2 g sodium    Activity: As tolerated    IV fluids: None    Antibiotics: None    DVT prophylaxis: Heparin    GI prophylaxis: PPI    Consult: None    Monitor: None    Labs: BMP    Imaging: Limited echo      Total unit/floor time 70 minutes.      Chief Complaint:  Shortness of breath 1 day     HPI:    Informant is ER notes.  Patient has dementia and cannot give reliable information.  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  It is more with ambulation.  She denies any lightheadedness dizziness chest pain.  proBNP was elevated.  Chest x-ray did not show [place] pulmonary edema.  Patient initially refused admission but then with further discussion agrees with the same.  Complete review of systems cannot be obtained.                     Medical History      Past Medical History:   Diagnosis Date     CHF (congestive heart failure) (H)      CKD (chronic kidney disease)      Dementia (H)      Depressive disorder      HLD (hyperlipidemia)      Hypertension       Family History  Reviewed, and           Allergies  Allergies   Allergen Reactions     Statins-Hmg-Coa Reductase Inhibitors [Hmg-Coa-R Inhibitors] Muscle Pain (Myalgia)    Social History  Reviewed, and she  reports that she has an unknown smoking status. She has never used smokeless tobacco.  CODE STATUS full  Review of Systems:  10-point ROS negative, except as noted in HPI            Prior to Admission Medications   (Not in a hospital admission)         Physical Exam:  Temp:  [97.7  F (36.5  C)-97.8  F (36.6  C)] 97.8  F (36.6  C)  Pulse:  [73-76] 76  Resp:  [20-27] 25  BP: (156-200)/(81-91) 190/81  SpO2:  [98 %] 98 %  Wt Readings from Last 5 Encounters:   10/19/22 49.9 kg (110 lb)   03/15/22 49.9 kg (110 lb)   03/09/22 52.3 kg (115 lb 6.4 oz)   03/07/22 51.8 kg (114 lb 1.6 oz)   03/03/22 52.7 kg (116 lb 3.2 oz)     Body mass index is 19.49 kg/m .  Awake does not appear to be in distress  No pallor, icterus, clubbing, cyanosis  Well-nourished  No sinus tenderness  Moist membranes  Neck supple, but thin  CVS: Pansystolic murmur best heard in the aortic area  Resp: Bilateral inspiratory crackles with no wheezing   Abd: soft, No  t/g/r  Neuro: no involuntary movements such as tremors  Vasc: Trace leg edema  No clubbing  Skin--no generalized skin rash     Pertinent Labs  Lab Results:  Recent Results (from the past 24 hour(s))   Comprehensive metabolic panel    Collection Time: 10/19/22 11:42 PM   Result Value Ref Range    Sodium 141 136 - 145 mmol/L    Potassium 5.0 3.4 - 5.3 mmol/L    Chloride 109 (H) 98 - 107 mmol/L    Carbon Dioxide (CO2) 19 (L) 22 - 29 mmol/L    Anion Gap 13 7 - 15 mmol/L    Urea Nitrogen 34.5 (H) 8.0 - 23.0 mg/dL    Creatinine 2.09 (H) 0.51 - 0.95 mg/dL    Calcium 9.8 8.8 - 10.2 mg/dL    Glucose 98 70 - 99 mg/dL    Alkaline Phosphatase 118 (H) 35 - 104 U/L    AST 13 10 - 35 U/L    ALT 7 (L) 10 - 35 U/L    Protein Total 6.4 6.4 - 8.3 g/dL    Albumin 4.2 3.5 - 5.2 g/dL    Bilirubin Total <0.2 <=1.2 mg/dL    GFR Estimate 23 (L) >60 mL/min/1.73m2   Troponin T, High Sensitivity    Collection Time: 10/19/22 11:42 PM   Result Value Ref Range    Troponin T, High Sensitivity 33 (H) <=14 ng/L   Nt probnp inpatient (BNP)    Collection Time: 10/19/22 11:42 PM   Result Value Ref Range    N terminal Pro BNP Inpatient 2,522 (H) 0 - 1,800 pg/mL   Symptomatic; Unknown Influenza A/B & SARS-CoV2 (COVID-19) Virus PCR Multiplex Nasopharyngeal    Collection Time: 10/19/22 11:42 PM    Specimen: Nasopharyngeal; Swab   Result Value Ref Range    Influenza A PCR Negative Negative    Influenza B PCR Negative Negative    RSV PCR Negative Negative    SARS CoV2 PCR Negative Negative   CBC with platelets and differential    Collection Time: 10/19/22 11:42 PM   Result Value Ref Range    WBC Count 7.0 4.0 - 11.0 10e3/uL    RBC Count 3.54 (L) 3.80 - 5.20 10e6/uL    Hemoglobin 10.9 (L) 11.7 - 15.7 g/dL    Hematocrit 34.8 (L) 35.0 - 47.0 %    MCV 98 78 - 100 fL    MCH 30.8 26.5 - 33.0 pg    MCHC 31.3 (L) 31.5 - 36.5 g/dL    RDW 15.2 (H) 10.0 - 15.0 %    Platelet Count 254 150 - 450 10e3/uL    % Neutrophils 55 %    % Lymphocytes 28 %    % Monocytes 10 %    %  Eosinophils 6 %    % Basophils 1 %    % Immature Granulocytes 0 %    NRBCs per 100 WBC 0 <1 /100    Absolute Neutrophils 3.9 1.6 - 8.3 10e3/uL    Absolute Lymphocytes 1.9 0.8 - 5.3 10e3/uL    Absolute Monocytes 0.7 0.0 - 1.3 10e3/uL    Absolute Eosinophils 0.4 0.0 - 0.7 10e3/uL    Absolute Basophils 0.1 0.0 - 0.2 10e3/uL    Absolute Immature Granulocytes 0.0 <=0.4 10e3/uL    Absolute NRBCs 0.0 10e3/uL     No results found for: HGBA1C  No results found for: IRON  Lab Results   Component Value Date    TROPONINI 0.18 02/16/2022     Lab Results   Component Value Date    TSH 1.45 09/22/2021       Pertinent Radiology  Radiology Results:  XR Chest Port 1 View    Result Date: 10/20/2022  EXAM: CHEST SINGLE VIEW PORTABLE LOCATION: [facility] DATE/TIME: 10/20/2022 12:08 AM INDICATION: Shortness of breath. COMPARISON: 9/4/2021. FINDINGS: A few curvilinear opacities scattered in the lungs likely represent scarring or atelectasis. The lungs are otherwise clear. Normal size cardiac silhouette. Atherosclerotic calcification in the thoracic aorta. Very large hiatal hernia again noted.     IMPRESSION: 1. No convincing evidence of active cardiopulmonary disease. 2. Very large hiatal hernia again noted.       EKG Results: personally reviewed.   Echo: No results found.

## 2022-10-20 NOTE — PROGRESS NOTES
Patient was admitted earlier today.  H&P reviewed.  She was admitted for CHF exacerbation with BNP of 2500.  Patient was seen and examined, all still in ED.  Diuresis started with IV Lasix.  Admission weight documented as 49.9 kg, 5 PM weight 48.2 kg, she diuresed 1.7 kg-near 3 pounds.  Hemodynamically stable.  Continue plan of care.  Check a.m. labs/electrolytes, given history of CKD 4.    Jane Olivera MD

## 2022-10-20 NOTE — PHARMACY-ADMISSION MEDICATION HISTORY
Pharmacy Note - Admission Medication History    Pertinent Provider Information: MAR provided by [employer]      ______________________________________________________________________    Prior To Admission (PTA) med list completed and updated in EMR.       PTA Med List   Medication Sig Last Dose     acetaminophen (TYLENOL) 325 MG tablet Take 975 mg by mouth 3 times daily 10/19/2022 at 1925     buPROPion (WELLBUTRIN XL) 150 MG 24 hr tablet Take 150 mg by mouth daily 10/19/2022 at 0800     cholecalciferol 50 MCG (2000 UT) tablet Take 2,000 Units by mouth daily 10/19/2022 at 0800     diltiazem ER (CARDIZEM SR) 90 MG 12 hr capsule Take 90 mg by mouth daily 10/19/2022 at 0800     dorzolamide-timolol PF (COSOPT PF) 2-0.5 % opthalmic solutionh INSTILL 1 DROP INTO THE LEFT EYE TWICE DAILY 10/19/2022 at 0800     gemfibroziL (LOPID) 600 MG tablet [GEMFIBROZIL (LOPID) 600 MG TABLET] Take 600 mg by mouth 2 (two) times a day before meals. 10/19/2022 at 1500     latanoprost (XALATAN) 0.005 % ophthalmic solution Place 1 drop into both eyes At Bedtime  10/19/2022 at 1925     mirtazapine (REMERON) 7.5 MG tablet Take 7.5 mg by mouth At Bedtime 10/19/2022 at 1925     multivitamin  with lutein (OCUVITE WITH LUTEIN) CAPS per capsule Take 1 capsule by mouth daily 10/19/2022 at 0800     omeprazole (PRILOSEC) 20 MG DR capsule Take 20 mg by mouth daily 10/19/2022 at 0800     Probiotic Product (PROBIOTIC-10 PO) Take 1 tablet by mouth daily 10/19/2022 at 0800     QUEtiapine (SEROQUEL) 25 MG tablet Take 12.5 mg by mouth At Bedtime 10/19/2022 at 1925     senna-docusate (SENOKOT-S/PERICOLACE) 8.6-50 MG tablet Take 1 tablet by mouth 2 times daily 10/19/2022 at 1500     sodium bicarbonate 650 MG tablet Take 1 tablet (650 mg) by mouth 2 times daily 10/19/2022 at 1500       Information source(s): Facility ([facility]/) medication list/MAR  Method of interview communication: N/A    Summary of Changes to PTA Med List  New: -  Discontinued:  -  Changed: diltiazem     Patient was asked about OTC/herbal products specifically.  PTA med list reflects this.    In the past week, patient estimated taking medication this percent of the time:  greater than 90%.    Allergies were reviewed, assessed, and updated with the patient.      Patient did not bring any medications to the hospital and can't retrieve from home. No multi-dose medications are available for use during hospital stay.     The information provided in this note is only as accurate as the sources available at the time of the update(s).    Thank you for the opportunity to participate in the care of this patient.    LEMUEL DUGAN RPH  10/19/2022 11:24 PM

## 2022-10-20 NOTE — ED TRIAGE NOTES
Patient arrived via EMS from assisted living facility. C/O SOB that started about 1700 tonight. Hx of COPD, CHF, and dementia. O2 sats are 97% on RA. EKG taken en route was NSR.      Triage Assessment     Row Name 10/19/22 9171       Triage Assessment (Adult)    Airway WDL WDL       Respiratory WDL    Respiratory WDL X;rhythm/pattern    Rhythm/Pattern, Respiratory shortness of breath       Skin Circulation/Temperature WDL    Skin Circulation/Temperature WDL WDL       Peripheral/Neurovascular WDL    Peripheral Neurovascular WDL WDL       Cognitive/Neuro/Behavioral WDL    Cognitive/Neuro/Behavioral WDL WDL

## 2022-10-20 NOTE — ED NOTES
"[facility] ED Handoff Report    ED Chief Complaint: SOB    ED Diagnosis:  (I50.9) Acute on chronic congestive heart failure, unspecified heart failure type (H)      (R06.02) SOB (shortness of breath)         PMH:    Past Medical History:   Diagnosis Date    CHF (congestive heart failure) (H)     CKD (chronic kidney disease)     Dementia (H)     Depressive disorder     HLD (hyperlipidemia)     Hypertension         Code Status:  Full Code     Falls Risk: Yes Band: Applied    Current Living Situation/Residence: lives in an assisted living facility     Elimination Status: Continent: purewick in place, per patient is not normally incontinent     Activity Level: SBA w/ walker    Patients Preferred Language:  English     Needed: No    Vital Signs:  BP (!) 138/92   Pulse 87   Temp 97.8  F (36.6  C) (Oral)   Resp 25   Ht 1.6 m (5' 3\")   Wt 49.9 kg (110 lb)   SpO2 95%   BMI 19.49 kg/m       Cardiac Rhythm: SR with occassional PVC's    Pain Score: 0/10    Is the Patient Confused:  No, however needs to be reminded of plan of care frequently, otherwise answers all questions appropriately    Last Food or Drink: 10/20/22 at 1400    Focused Assessment:  Pt comes in with shortness of breath, sats are stable on room air. Purewick in place, approximately 1200mL UO since 7am. Pt had a small lunch, ate 100% of it. Complained of a headache earlier today, PRN meds given which worked well for her.     Tests Performed: Done: Labs and Imaging    Treatments Provided:  see MAR    Family Dynamics/Concerns: No    Family Updated On Visitor Policy: Yes    Plan of Care Communicated to Family: Yes    Who Was Updated about Plan of Care: daughter    Belongings Checklist Done and Signed by Patient: No    Medications sent with patient: none    Covid: symptomatic, negative        RN: Zhanna Dotson RN 10/20/2022 4:04 PM       "

## 2022-10-21 NOTE — CONSULTS
Care Management Initial Consult    General Information  Assessment completed with: Patient, Children,         Primary Care Provider verified and updated as needed:     Readmission within the last 30 days:           Advance Care Planning:            Communication Assessment  Patient's communication style: spoken language (English or Bilingual)    Hearing Difficulty or Deaf: no   Wear Glasses or Blind: no    Cognitive  Cognitive/Neuro/Behavioral: WDL  Level of Consciousness: other (see comments)  Arousal Level: opens eyes spontaneously  Orientation: oriented x 4  Mood/Behavior: calm  Best Language: 0 - No aphasia  Speech: clear    Living Environment:   People in home: alone     Current living Arrangements: assisted living  Name of Facility: [email or web address]   Able to return to prior arrangements: yes  Living Arrangement Comments: [facility]    Family/Social Support:  Care provided by: other (see comments)  Provides care for: no one, no one, unable/limited ability to care for self  Marital Status:   Children          Description of Support System: Supportive    Support Assessment: Adequate family and caregiver support, Adequate social supports    Current Resources:   Patient receiving home care services: No     Community Resources:    Equipment currently used at home: walker, rolling  Supplies currently used at home:      Employment/Financial:  Employment Status: retired        Financial Concerns: No concerns identified   Referral to Financial Worker: No       Lifestyle & Psychosocial Needs:  Social Determinants of Health     Tobacco Use: Unknown     Smoking Tobacco Use: Unknown     Smokeless Tobacco Use: Never     Passive Exposure: Not on file   Alcohol Use: Not on file   Financial Resource Strain: Not on file   Food Insecurity: Not on file   Transportation Needs: Not on file   Physical Activity: Not on file   Stress: Not on file   Social Connections: Not on file   Intimate Partner Violence: Not on file    Depression: Not on file   Housing Stability: Not on file       Functional Status:  Prior to admission patient needed assistance:   Dependent ADLs:: Ambulation-walker  Dependent IADLs:: Cleaning, Cooking, Laundry, Meal Preparation, Medication Management, Money Management, Transportation       Mental Health Status:  Mental Health Status: No Current Concerns       Chemical Dependency Status:  Chemical Dependency Status: No Current Concerns             Values/Beliefs:  Spiritual, Cultural Beliefs, Jew Practices, Values that affect care:                 Additional Information:  SW met with pt to introduce role of CM, complete  initial assessment, and to discuss needs at time of d/c. SW also spoke with daughter (Nabila) for assistance with answer questions that pt was not able to. Pt comes from [facility] (P:[phone] F: [phone] [facility]; receiving assistance with meals, laundry, cleaning and medication assistance. Pt recently had therapy in the home post ortho surgery and is not very interested in discharging with said services though therapy had recommended home PT/OT. Pt hopes to return home with resumption of services. Nabila stated that she feels comfortable with pt returning and requested that hospitalist order home PT/OT so facility can provide services (care manager to request from discharging MD).  12:47 PM    PLAN: Return to [facility] with pop PT/OT (facility agency). Josefa Arenas ([phone]) to transport at 1200 10/22.      RIO Urbina

## 2022-10-21 NOTE — PROGRESS NOTES
Heart Failure Care Map  GOALS TO BE MET BEFORE DISCHARGE:    1. Decrease congestion and/or edema with diuretic therapy to achieve near optimal volume status.     Dyspnea improved: Yes, satisfactory for discharge.   Edema improved: Yes, satisfactory for discharge.        Net I/O and Weights since admission:   09/21 0700 - 10/21 0659  In: 410 [P.O.:410]  Out: 2025 [Urine:2025]  Net: -1615     Vitals:    10/19/22 2247 10/20/22 1658   Weight: 49.9 kg (110 lb) 48.2 kg (106 lb 3.2 oz)       2.  O2 sats > 90% on room air, or at prior home O2 therapy level.      Able to wean O2 this shift to keep sats above 90%?: Yes, satisfactory for discharge.   Does patient use Home O2? No          Current oxygenation status:   SpO2: 95 %     O2 Device: None (Room air),      3.  Tolerates ambulation and mobility near baseline.     Ambulation: No, further care required to meet this goal. Please explain Pt used walker to ambulate to bathroom, weak but tolerated well.   Times patient ambulated with staff this shift: 1    Please review the Heart Failure Care Map for additional HF goal outcomes.    STANTON PEREZ RN  10/21/2022

## 2022-10-21 NOTE — PLAN OF CARE
Heart Failure Care Map  GOALS TO BE MET BEFORE DISCHARGE:    1. Decrease congestion and/or edema with diuretic therapy to achieve near optimal volume status.     Dyspnea improved: No, further care required to meet this goal. Please explain Pt still c/o dyspnea upon exertion.   Edema improved: Yes, satisfactory for discharge.        Net I/O and Weights since admission:   09/21 2300 - 10/21 2259  In: 410 [P.O.:410]  Out: 3125 [Urine:3125]  Net: -2715     Vitals:    10/19/22 2247 10/20/22 1658 10/21/22 0500   Weight: 49.9 kg (110 lb) 48.2 kg (106 lb 3.2 oz) 47.6 kg (105 lb)       2.  O2 sats > 90% on room air, or at prior home O2 therapy level.      Able to wean O2 this shift to keep sats above 90%?: Yes, satisfactory for discharge.   Does patient use Home O2? No          Current oxygenation status:   SpO2: 95 %     O2 Device: None (Room air),      3.  Tolerates ambulation and mobility near baseline.     Ambulation: No, further care required to meet this goal. Please explain Pt stating she's too weak and tired to ambulate this afternoon, will reproach.    Times patient ambulated with staff this shift: 1    Pt extremely anxious this afternoon, vitally stable. MD paged. Pt does not appear to be in distress during these anxious episodes. Calm reassuring presence is helpful.    Please review the Heart Failure Care Map for additional HF goal outcomes.    Juanita Lopez RN  10/21/2022

## 2022-10-21 NOTE — CONSULTS
HEART CARE NOTE        Thank you, Dr. Olivera, for asking the [facility] team to see Holli Richardson to evaluate ADHF.      Assessment/Recommendations     1. HFpEF/HOCM c/b acute cardiogenic pulmonary edema   Assessment / Plan    Near euvolemia on physical exam- will hold further diuresis for today, and plan to transition to oral diuretic regimen tomorrow - will need to be cautious with diuresis as patient is preload dependent    Patient presented in hypertensive emergency with SBP > 200; this was likely the cause of acute cardiogenic pulmonary edema due to critically elevated systemic vascular resistance/afterload - presenting symptoms of dyspnea likely due more due to pressure shifts and less likely due to volume status    Will need strict BP control to prevent further episodes - avoid ACE-/ARB/ARNI/hydralazine as these agents can worsen outflow tract obstructions; titrate diltiazem (or other non-dihydropyridine) as tolerated; patient without adequate HR to add BBlocker    GDMT as detailed below; mainstay of treatment for HFpEF includes diuretics and adequate BP control (class I) and SGLT2-I (class 2a); additional medical therapy (ARNI, MRA, ARB) demonstrated less robust evidence for indication but may be considered per guideline recommendations (2b); no indication for BBlockers     Current Pharmacotherapy AHA Guideline-Directed Medical Therapy   Losartan - on hold 2/2 KAM ARNI/ARB   Spironolactone - not started  MRA   SGLT2 inhibitor - not started SGLT2-I    Furosemide IV - op hold Loop diuretic      2. CKD stage 4  Assessment / Plan    Continue to monitor renal function closely while diuresing    3. Hypertensive urgency  Assessment / Plan    SPB >200 in admission; given HOCM - avoid ACE-/ARB/ARNI/hydralazine as these agents can worsen outflow tract obstructions; titrate diltiazem (or other non-dihydropyridine) as tolerated; patient without adequate HR to add BBlocker    Will also need to be cautious  "with diuresis as patient is preload dependent       Clinically Significant Risk Factors Present on Admission              Fluid & Electrolyte Disorders: Fluid overload, unspecified, Other fluid overload and Other disorders of electrolyte and fluid balance, not elsewhere classified    Nephrology: Acute kidney failure, unspecified  CKD POA List: Stage 4 (GFR 15-29)        History of Present Illness/Subjective    Ms. Holli Richardson is a 86 year old female with a PMHx significant for (per Dr. 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 Richardson denies any current cardiac complaints or concerns; She reports that she presented to the ED due to sudden onset of dyspnea x 2 days; She reports that her symptoms were exacerbated by activity and releoved with rest; Management plan as detailed above.     ECG: Personally reviewed. normal sinus rhythm, occasional PVC noted, unifocal.    ECHO (personnaly Reviewed):  There is moderate to severe concentric left ventricular hypertrophy.  The echo findings are consistent with significant left ventricular outflow  obstruction.  Hyperdynamic left ventricular function  The visual ejection fraction is >70%.  There is systolic anterior motion of the mitral valve. There is mild to  moderate (1-2+) mitral regurgitation.        Physical Examination Review of Systems   /85   Pulse 70   Temp 97.5  F (36.4  C) (Oral)   Resp 18   Ht 1.6 m (5' 3\")   Wt 47.6 kg (105 lb)   SpO2 95%   BMI 18.60 kg/m    Body mass index is 18.6 kg/m .  Wt Readings from Last 3 Encounters:   10/21/22 47.6 kg (105 lb)   03/15/22 49.9 kg (110 lb)   03/09/22 52.3 kg (115 lb 6.4 oz)     General Appearance:   no distress, normal body habitus   ENT/Mouth: membranes moist, no oral lesions or bleeding gums.      EYES:  no scleral " icterus, normal conjunctivae   Neck: no carotid bruits or thyromegaly   Chest/Lungs:   lungs are clear to auscultation, no rales or wheezing, equal chest wall expansion    Cardiovascular:   Regular. Normal first and second heart sounds with +GAIL; no rubs, or gallops; the carotid, radial and posterior tibial pulses are intact, no JVD or LE edema bilaterally    Abdomen:  no organomegaly, masses, bruits, or tenderness; bowel sounds are present   Extremities: no cyanosis or clubbing   Skin: no xanthelasma, warm.    Neurologic: alert and calm     Psychiatric: alert and calm     A complete 10 systems ROS was reviewed  And is negative except what is listed in the HPI.          Medical History  Surgical History Family History Social History   Past Medical History:   Diagnosis Date     CHF (congestive heart failure) (H)      CKD (chronic kidney disease)      Dementia (H)      Depressive disorder      HLD (hyperlipidemia)      Hypertension     Past Surgical History:   Procedure Laterality Date     OPEN REDUCTION INTERNAL FIXATION HIP BIPOLAR Left 2/17/2022    Procedure: HEMIARTHROPLASTY, HIP, BIPOLAR;  Surgeon: Micah Marvin MD;  Location: [facility] OR     PICC DOUBLE LUMEN PLACEMENT  9/22/2021         no family history of premature coronary artery disease Social History     Socioeconomic History     Marital status:      Spouse name: Not on file     Number of children: Not on file     Years of education: Not on file     Highest education level: Not on file   Occupational History     Not on file   Tobacco Use     Smoking status: Unknown     Smokeless tobacco: Never   Substance and Sexual Activity     Alcohol use: Not on file     Drug use: Not on file     Sexual activity: Not on file   Other Topics Concern     Not on file   Social History Narrative     Not on file     Social Determinants of Health     Financial Resource Strain: Not on file   Food Insecurity: Not on file   Transportation Needs: Not on file    Physical Activity: Not on file   Stress: Not on file   Social Connections: Not on file   Intimate Partner Violence: Not on file   Housing Stability: Not on file           Lab Results    Chemistry/lipid CBC Cardiac Enzymes/BNP/TSH/INR   Lab Results   Component Value Date    CHOL 209 (H) 11/03/2021    HDL 86 11/03/2021    TRIG 77 11/03/2021    BUN 48.2 (H) 10/21/2022     10/21/2022    CO2 20 (L) 10/21/2022    Lab Results   Component Value Date    WBC 8.1 10/21/2022    HGB 11.5 (L) 10/21/2022    HCT 36.6 10/21/2022    MCV 98 10/21/2022     10/21/2022    Lab Results   Component Value Date    TROPONINI 0.18 02/16/2022     (H) 03/07/2022    TSH 1.45 09/22/2021    INR 1.07 02/16/2022     Lab Results   Component Value Date    TROPONINI 0.18 02/16/2022          Weight:    Wt Readings from Last 3 Encounters:   10/21/22 47.6 kg (105 lb)   03/15/22 49.9 kg (110 lb)   03/09/22 52.3 kg (115 lb 6.4 oz)       Allergies  Allergies   Allergen Reactions     Statins-Hmg-Coa Reductase Inhibitors [Hmg-Coa-R Inhibitors] Muscle Pain (Myalgia)         Surgical History  Past Surgical History:   Procedure Laterality Date     OPEN REDUCTION INTERNAL FIXATION HIP BIPOLAR Left 2/17/2022    Procedure: HEMIARTHROPLASTY, HIP, BIPOLAR;  Surgeon: Micah Marvin MD;  Location: [facility] OR     PICC DOUBLE LUMEN PLACEMENT  9/22/2021            Social History  Tobacco:   History   Smoking Status     Unknown   Smokeless Tobacco     Never    Alcohol:   Social History    Substance and Sexual Activity      Alcohol use: Not on file   Illicit Drugs:   History   Drug Use Not on file       Family History  No family history on file.       Elva Mancilla MD on 10/21/2022      cc: Everardo English,

## 2022-10-21 NOTE — PROGRESS NOTES
[facility]    Medicine Progress Note - Hospitalist Service    Date of Admission:  10/19/2022    Assessment & Plan      86-year-old with history of hypertension, diastolic dysfunction, dementia presented with sudden onset shortness of breath.  proBNP is elevated.  Received IV Lasix, steroid, and nebs in the ER.  We will continue IV Lasix for now.  Ordered limited echocardiogram.     Acute exacerbation of HFpEF  EF 70% with LV outflow obstruction  Diuresed with IV Lasix, transition to p.o. diuretics on 10/22  Low-salt diet     Accelerated hypertension, SBP above 200 on admission.  Cardiology recommending optimization of BP, avoiding ACE inhibitor/ARB/hydralazine as this agents can worsen outflow tract obstruction.  Titrate diltiazem as tolerated.     CKD 4  Baseline creatinine ranging from 1.7-2.0  Creatinine slightly worsened with diuresis.  Resumed PTA sodium bicarbonate.     Mild cognitive impairment  Continue home medications  Monitor urinary retention  OT evaluation     Large hiatal hernia  Continue PPI     Diet: 2 Gram Sodium Diet Other - please comment    DVT Prophylaxis: Heparin SQ  [place] Catheter: Not present  Central Lines: None  Cardiac Monitoring: ACTIVE order. Indication: Acute decompensated heart failure (48 hours)  Code Status: Full Code      Disposition Plan     Expected Discharge Date: 10/22/2022                The patient's care was discussed with the Bedside Nurse, Care Coordinator/, Patient and Patient's Family.    Jane Olivera MD  Hospitalist Service  [facility]  Securely message with the Vocera Web Console (learn more here)  Text page via [employer] Paging/Directory   _____________________________________________________________________    Interval History   Patient is new to me patient was admitted 10/21 fall diastolic CHF exacerbation.  Diuresed 5 pounds since admission.  Euvolemic today.  Seen by cardiology, recommendations  reviewed-transition to p.o. diuretics.  Remains hospitalized for optimization of BP.  She denies chest pain, cardiac palpitations, abdominal pain, nausea, dysuria, arthralgia, myalgia.  She was evaluated by PT/OT, ambulated 150 feet with standby assist.  Patient daughter was called with updates.    Data reviewed today: I reviewed all medications, new labs and imaging results over the last 24 hours. I personally reviewed    Physical Exam   Vital Signs: Temp: 97.5  F (36.4  C) Temp src: Oral BP: 128/67 Pulse: 67   Resp: 18 SpO2: 94 % O2 Device: None (Room air)    Weight: 105 lbs 0 oz  General: Alert and oriented x 3. Not in obvious distress.  HEENT: NC, AT. Neck- supple, No JVP elevation, lymphadenopathy or thyromegaly. Trachea-central.  Chest: Clear to auscultation bilaterally.  Heart: S1S2 regular. No M/R/G.  Abdomen: Soft. NT, ND. No organomegaly. Bowel sounds- active.  Back: No spine tenderness. No CVA tenderness.  Extremities: No leg swelling. Peripheral pulses 2+ bilaterally.  Neuro: Cranial nerves 1-12 grossly normal. No focal neurological deficit    Data   Recent Labs   Lab 10/21/22  0548 10/19/22  2342   WBC 8.1 7.0   HGB 11.5* 10.9*   MCV 98 98    254    141   POTASSIUM 4.1 5.0   CHLORIDE 103 109*   CO2 20* 19*   BUN 48.2* 34.5*   CR 2.15* 2.09*   ANIONGAP 13 13   GISELE 9.8 9.8   GLC 99 98   ALBUMIN  --  4.2   PROTTOTAL  --  6.4   BILITOTAL  --  <0.2   ALKPHOS  --  118*   ALT  --  7*   AST  --  13     No results found for this or any previous visit (from the past 24 hour(s)).  Medications       acetaminophen  975 mg Oral TID     buPROPion  150 mg Oral Daily     [START ON 10/22/2022] diltiazem ER  120 mg Oral Daily     dorzolamide-timolol  1 drop Both Eyes BID     [START ON 10/22/2022] furosemide  20 mg Oral Daily     heparin ANTICOAGULANT  5,000 Units Subcutaneous Q12H     latanoprost  1 drop Both Eyes At Bedtime     mirtazapine  7.5 mg Oral At Bedtime     multivitamin  with lutein  1 capsule Oral  Daily     pantoprazole  40 mg Oral QAM AC     QUEtiapine  12.5 mg Oral At Bedtime     senna-docusate  1 tablet Oral BID     sodium chloride (PF)  3 mL Intracatheter Q8H

## 2022-10-21 NOTE — PROGRESS NOTES
10/21/22 1028   Appointment Info   Signing Clinician's Name / Credentials (PT) Anabel Zee PT   Living Environment   People in Home alone  (24 hour assist if needed.)   Current Living Arrangements assisted living  (2nd apt)   Home Accessibility no concerns   Living Environment Comments Pt walks to 3 meals a day and does a 4WW indep. Pt walks indep in the apt indep without AD.  Indep with bed mobility and transfers   Self-Care   Equipment Currently Used at Home walker, rolling  (4WW.,, walk in shower with shower chair and GB.)   Activity/Exercise/Self-Care Comment Assisted with bathing and she does sit on a chair to bath.  (assisted with driving.)   General Information   Onset of Illness/Injury or Date of Surgery 10/19/22   Referring Physician Dr JAYLEN Olivera   Patient/Family Therapy Goals Statement (PT) to go home   Pertinent History of Current Problem (include personal factors and/or comorbidities that impact the POC) Per the chart. Holli Richardson is a 86 year old female presenting to the Emergency Department with a chief complaint of shortness of breath, now resolved. She has a history of COPD, CHF and dementia, here BIB EMS from assisted living facility.   Existing Precautions/Restrictions fall   Weight-Bearing Status - LLE weight-bearing as tolerated   Weight-Bearing Status - RLE weight-bearing as tolerated   Cognition   Orientation Status (Cognition) oriented to;person;place  (year. Pt does follow commands.)   Pain Assessment   Patient Currently in Pain No   Strength (Manual Muscle Testing)   Strength Comments 5/5 bilat LEs   Bed Mobility   Comment, (Bed Mobility) supine<>sit indep, no rail   Transfers   Comment, (Transfers) mod indep with FWW.   Gait/Stairs (Locomotion)   [place] Level (Gait) supervision   Assistive Device (Gait) walker, front-wheeled  (no LOB)   Distance in Feet (Required for LE Total Joints) 20'   Distance in Feet (Gait) 130'   Pattern (Gait) step-through;swing-through    Deviations/Abnormal Patterns (Gait) gait speed decreased   Balance   Balance Comments supervision with FWW   Sensory Examination   Sensory Perception WNL  (LEs)   Clinical Impression   Criteria for Skilled Therapeutic Intervention Yes, treatment indicated   PT Diagnosis (PT) impaired functional mobility.   Influenced by the following impairments dec bal, dec endurance.   Functional limitations due to impairments gait   Clinical Presentation (PT Evaluation Complexity) Stable/Uncomplicated   Clinical Presentation Rationale Pt presents medically diagnosed   Clinical Decision Making (Complexity) low complexity   Planned Therapy Interventions (PT) balance training;gait training;home exercise program   Anticipated Equipment Needs at Discharge (PT) walker, rolling  (4WW)   Risk & Benefits of therapy have been explained evaluation/treatment results reviewed;care plan/treatment goals reviewed;patient;participants voiced agreement with care plan   PT Total Evaluation Time   PT Eval, Low Complexity Minutes (39408) 10   Physical Therapy Goals   PT Frequency Daily   PT Predicted Duration/Target Date for Goal Attainment 10/24/22   PT Goals Gait   PT: Gait Modified independent;Greater than 200 feet   Interventions   Interventions Quick Adds Gait Training;Therapeutic Activity;Therapeutic Procedure   Therapeutic Procedure/Exercise   Ther. Procedure: strength, endurance, ROM, flexibillity Minutes (69070) 2   Treatment Detail/Skilled Intervention bilat LE AP and ankle circles x 20 reps. Cues for technique.   Therapeutic Activity   Therapeutic Activities: dynamic activities to improve functional performance Minutes (00481) 6   Treatment Detail/Skilled Intervention supine<>sit indep and transfers with FWW mod indep.  Pt did rest between activities.   Gait Training   Gait Training Minutes (74266) 10   Symptoms Noted During/After Treatment (Gait Training) none   Treatment Detail/Skilled Intervention Pt walked with the FWW and had no  LOB. Some cues for direction.   Physical Assistance Level (Gait Training) supervision;1 person assist   Assistive Device (Gait Training) rolling walker  (FWW)   Pattern Analysis (Gait Training) swing-through gait   Gait Analysis Deviations decreased aftab   PT Discharge Planning   PT Plan gait w 4WW, try bal with gait w/o AD,  transfers, LE ex.   PT Discharge Recommendation (DC Rec) home with assist  (Pt does not want any home PT)   PT Rationale for DC Rec Pt should be able to dc to home with assist.   PT Brief overview of current status Pt walked 150' with FWW with supervision and had no LOB, indep with bed mobility and transfers.   Total Session Time   Timed Code Treatment Minutes 18   Total Session Time (sum of timed and untimed services) 28

## 2022-10-22 PROBLEM — F41.1 GENERALIZED ANXIETY DISORDER: Status: ACTIVE | Noted: 2022-01-01

## 2022-10-22 NOTE — PLAN OF CARE
"   Problem: Heart Failure  Goal: Effective Oxygenation and Ventilation  Outcome: Progressing  Intervention: Promote Airway Secretion Clearance  Recent Flowsheet Documentation  Taken 10/22/2022 0800 by Juanita Lopez, RN  Activity Management:   activity adjusted per tolerance   up in chair   ambulated in room   ambulated to bathroom     Pt ambulating to BR and in room, up with minimal SBA. Utilizing walker, as she does at home. Pt extremely anxious this morning PRN xanax given. Pt states constantly \"I just don't know what to do in here\". Pt on RA, LS clear, VSS.  "

## 2022-10-22 NOTE — PLAN OF CARE
Physical Therapy Discharge Summary    Reason for therapy discharge:    OT to continue with CHF protocol    Progress towards therapy goal(s). See goals on Care Plan in [facility] electronic health record for goal details.  Goals not met.  Barriers to achieving goals:   OT to continue with gait and CHF protocol .    Therapy recommendation(s):    OT to continue to walk with the pt.  PT to dc at this time.      Goal Outcome Evaluation:

## 2022-10-22 NOTE — PROGRESS NOTES
[facility]    Medicine Progress Note - Hospitalist Service    Date of Admission:  10/19/2022    Assessment & Plan      86-year-old with history of hypertension, diastolic dysfunction, dementia presented with sudden onset shortness of breath.  proBNP is elevated.  Received IV Lasix, steroid, and nebs in the ER.  We will continue IV Lasix for now.  Ordered limited echocardiogram.     Acute exacerbation of HFpEF  EF 70% with LV outflow obstruction  Diuresed with IV Lasix, 10 pounds down since admission, euvolemic, and creatinine bumped to 2.6 from 2.09 on admission.  Holding diuretics 10/22.  Low-salt diet     Accelerated hypertension, SBP above 200 on admission.  Cardiology recommending optimization of BP, avoiding ACE inhibitor/ARB/hydralazine as this agents can worsen outflow tract obstruction.  Titrate diltiazem as tolerated.     CKD 4  Baseline creatinine ranging from 1.7-2.0  Creatinine slightly worsened with diuresis.  Resumed PTA sodium bicarbonate.     Mild cognitive impairment  Continue home medications  Monitor urinary retention  OT evaluation    MDD.  Generalized anxiety disorder.  Trial of Xanax.  PTA on Wellbutrin, no history of smoking.  Will transition to low-dose Lexapro.  D/W pharmacist.     Large hiatal hernia  Continue PPI     Diet: 2 Gram Sodium Diet Other - please comment    DVT Prophylaxis: Heparin SQ  [place] Catheter: Not present  Central Lines: None  Cardiac Monitoring: ACTIVE order. Indication: Acute decompensated heart failure (48 hours)  Code Status: Full Code      Disposition Plan      Expected Discharge Date: 10/23/2022      Destination: assisted living;home          The patient's care was discussed with the Bedside Nurse, Care Coordinator/, Patient and Patient's Family.    Jane Olivera MD  Hospitalist Service  [facility]  Securely message with the Vocera Web Console (learn more here)  Text page via [employer] Paging/Directory    _____________________________________________________________________    Interval History   Patient was admitted 10/21 fall diastolic CHF exacerbation.  Weight is 10 pounds down since admission.  Euvolemic.  Holding diuretics today.  BP improved.  She denies chest pain, cardiac palpitations, abdominal pain, nausea, dysuria, arthralgia, myalgia.  Per nursing, anxious.  I seen patient after she received dose of Xanax, still admitting to feeling anxious.  Denies history of panic attacks.  No history of stimulant substance/alcohol abuse.  Patient daughter was called with updates.    Data reviewed today: I reviewed all medications, new labs and imaging results over the last 24 hours. I personally reviewed    Physical Exam   Vital Signs: Temp: 97.6  F (36.4  C) Temp src: Oral BP: (!) 84/47 Pulse: 74   Resp: 18 SpO2: 94 % O2 Device: None (Room air)    Weight: 99 lbs 11.2 oz  General: Alert and oriented x 3. Not in obvious distress.  HEENT: NC, AT. Neck- supple, No JVP elevation, lymphadenopathy or thyromegaly. Trachea-central.  Chest: Clear to auscultation bilaterally.  Heart: S1S2 regular. No M/R/G.  Abdomen: Soft. NT, ND. No organomegaly. Bowel sounds- active.  Back: No spine tenderness. No CVA tenderness.  Extremities: No leg swelling. Peripheral pulses 2+ bilaterally.  Neuro: Cranial nerves 1-12 grossly normal. No focal neurological deficit    Data   Recent Labs   Lab 10/22/22  0447 10/21/22  0548 10/19/22  2342   WBC 7.5 8.1 7.0   HGB 11.6* 11.5* 10.9*   MCV 97 98 98    268 254    136 141   POTASSIUM 4.1 4.1 5.0   CHLORIDE 101 103 109*   CO2 19* 20* 19*   BUN 66.4* 48.2* 34.5*   CR 2.60* 2.15* 2.09*   ANIONGAP 16* 13 13   GISELE 9.5 9.8 9.8   GLC 93 99 98   ALBUMIN  --   --  4.2   PROTTOTAL  --   --  6.4   BILITOTAL  --   --  <0.2   ALKPHOS  --   --  118*   ALT  --   --  7*   AST  --   --  13     No results found for this or any previous visit (from the past 24 hour(s)).  Medications       acetaminophen   975 mg Oral TID     diltiazem ER  120 mg Oral Daily     dorzolamide-timolol  1 drop Both Eyes BID     escitalopram  10 mg Oral Daily     heparin ANTICOAGULANT  5,000 Units Subcutaneous Q12H     latanoprost  1 drop Both Eyes At Bedtime     mirtazapine  7.5 mg Oral At Bedtime     multivitamin  with lutein  1 capsule Oral Daily     pantoprazole  40 mg Oral QAM AC     QUEtiapine  25 mg Oral At Bedtime     senna-docusate  1 tablet Oral BID     sodium chloride (PF)  3 mL Intracatheter Q8H

## 2022-10-22 NOTE — PLAN OF CARE
Heart Failure Care Map  GOALS TO BE MET BEFORE DISCHARGE:    1. Decrease congestion and/or edema with diuretic therapy to achieve near optimal volume status.     Dyspnea improved: Yes, satisfactory for discharge.   Edema improved: Yes, satisfactory for discharge.        Net I/O and Weights since admission:   09/22 1500 - 10/22 1459  In: 530 [P.O.:530]  Out: 3525 [Urine:3525]  Net: -2995     Vitals:    10/19/22 2247 10/20/22 1658 10/21/22 0500 10/22/22 0317   Weight: 49.9 kg (110 lb) 48.2 kg (106 lb 3.2 oz) 47.6 kg (105 lb) 45.2 kg (99 lb 11.2 oz)       2.  O2 sats > 92% on room air, or at prior home O2 therapy level.      Able to wean O2 this shift to keep sats above 92%?: Yes, satisfactory for discharge.   Does patient use Home O2? No          Current oxygenation status:   SpO2: 96 %     O2 Device: None (Room air),      3.  Tolerates ambulation and mobility near baseline.     Ambulation: Yes, satisfactory for discharge.   Times patient ambulated with staff this shift: 1    Please review the Heart Failure Care Map for additional HF goal outcomes.    Christiana Logan RN  10/22/2022 Goal Outcome Evaluation:    Pt's vital signs were stable. She was NSR on telemetry. She denied any chest pain or SOB. She had a purewick in place but hadn't voided all night. Her bladder scan at 0400 was 417. She was able to get up and void into the toilet and her PVR at 0656 was 176. A new purewick was placed at this time. Will continue to monitor.

## 2022-10-22 NOTE — PROGRESS NOTES
Cardiology Progress Note  New to me.  Chart reviewed.  Assessment/Plan:    1. Heart failure with preserved ejection fraction.  Evidence by echo of hypertrophic cardiomyopathy with mild LVOT obstruction on previous evaluation 1 year ago.  Target tightly controlled heart rate, adequate volume intake without sodium bicarbonate supplement.  Will discontinue NaHCO3, increase diltiazem.  Avoid dehydration.  Will discontinue furosemide.  2. Hypertensive heart disease with concentric left ventricular hypertrophy    Encouraged to continue efforts at increased ambulation.  Likely ready for discharge 1 or 2 days    Principal Problem:    Acute on chronic diastolic heart failure (H)  Active Problems:    Chronic kidney disease, stage IV (severe) (H)    Major depressive disorder, single episode, moderate (H)     LOS: 2 days     Subjective:  Calls acute onset of shortness of breath on day of admission.  Occurred while preparing for bed.  No associated chest pain or tightness.  Does not recall ever experiencing similar symptoms.  No unusual food intake.  Fatigued after therapy this a.m.    Objective:   Vital signs in last 24 hours:  Vitals:    10/21/22 1935 10/21/22 2318 10/22/22 0317 10/22/22 0805   BP: 138/67 95/51 (!) 144/65 (!) 148/73   BP Location: Left arm Right arm Left arm Left arm   Patient Position: Semi-Saldivar's      Cuff Size: Adult Small      Pulse: 79 68 65 75   Resp: 18 18 18 20   Temp: 97.5  F (36.4  C) 98.5  F (36.9  C) 97.7  F (36.5  C) 97.4  F (36.3  C)   TempSrc: Oral Oral Oral Oral   SpO2: 95% 93% 96% 94%   Weight:   45.2 kg (99 lb 11.2 oz)    Height:         Weight:   Wt Readings from Last 3 Encounters:   10/22/22 45.2 kg (99 lb 11.2 oz)   03/15/22 49.9 kg (110 lb)   03/09/22 52.3 kg (115 lb 6.4 oz)           PHYSICAL EXAM    Marked kyphosis.  Respiratory:  Normal breath sounds, No respiratory distress, No wheezing, No chest tenderness.   Cardiovascular:   Normal heart rate, Normal rhythm, harsh 3/6  holosystolic murmur radiating throughout the precordium.  No change with Valsalva.  GI:  Bowel sounds normal, Soft, No tenderness, No masses  Extremities: no edema       Cardiographics:   Telemetry sinus rhythm, 60 to 80 bpm.  No pauses.    Echocardiogram 10/20/2022:  There is moderate to severe concentric left ventricular hypertrophy.  The echo findings are consistent with significant left ventricular outflow  obstruction.  Hyperdynamic left ventricular function  The visual ejection fraction is >70%.  There is systolic anterior motion of the mitral valve. There is mild to  moderate (1-2+) mitral regurgitation.      Imaging:  EXAM: CHEST SINGLE VIEW PORTABLE  LOCATION: [facility]  DATE/TIME: 10/20/2022 12:08 AM  INDICATION: Shortness of breath.  COMPARISON: 9/4/2021.  FINDINGS: A few curvilinear opacities scattered in the lungs likely represent scarring or atelectasis. The lungs are otherwise clear. Normal size cardiac silhouette. Atherosclerotic calcification in the thoracic aorta. Very large hiatal hernia again   noted.                                                               IMPRESSION:   1. No convincing evidence of active cardiopulmonary disease.  2. Very large hiatal hernia again noted.       Lab Results:   Lab Results   Component Value Date    WBC 7.5 10/22/2022    HGB 11.6 (L) 10/22/2022    HCT 36.7 10/22/2022     10/22/2022    CHOL 209 (H) 11/03/2021    TRIG 77 11/03/2021    HDL 86 11/03/2021    ALT 7 (L) 10/19/2022    AST 13 10/19/2022     10/22/2022    BUN 66.4 (H) 10/22/2022    CO2 19 (L) 10/22/2022    TSH 1.45 09/22/2021    INR 1.07 02/16/2022    MICROALBUR 12.45 (H) 07/09/2021     Lab Results   Component Value Date    TROPONINI 0.18 02/16/2022         Beau Youngblood MD [facility]  10/22/2022

## 2022-10-22 NOTE — PROGRESS NOTES
Care Management Follow Up    Length of Stay (days): 2    Expected Discharge Date: 10/23/2022     Concerns to be Addressed: discharge planning     Patient plan of care discussed at interdisciplinary rounds: Yes    Anticipated Discharge Disposition: Assisted Living     Anticipated Discharge Services:    Anticipated Discharge DME: [facility]    Patient/family educated on Medicare website which has current facility and service quality ratings: no  Education Provided on the Discharge Plan:    Patient/Family in Agreement with the Plan: yes    Referrals Placed by CM/SW:    Private pay costs discussed: Not applicable    Additional Information:  Pt comes from [facility] (P:[phone] F: [phone] [facility]); receiving assistance with meals, laundry, cleaning and medication assistance. Pt hopes to return home with resumption of services. Nabila stated that she feels comfortable with pt returning and requested that hospitalist order home PT/OT so facility can provide services.    [facility] notified [facility] that patient would not be discharging today.      Eda Feliciano

## 2022-10-22 NOTE — PROGRESS NOTES
Heart Failure Care Map  GOALS TO BE MET BEFORE DISCHARGE:    1. Decrease congestion and/or edema with diuretic therapy to achieve near optimal volume status.     Dyspnea improved: No, further care required to meet this goal. Please explain Pt c/o dyspnea on exertion    Edema improved: Yes, satisfactory for discharge.        Net I/O and Weights since admission:   09/21 2300 - 10/21 2259  In: 530 [P.O.:530]  Out: 3525 [Urine:3525]  Net: -2995     Vitals:    10/19/22 2247 10/20/22 1658 10/21/22 0500   Weight: 49.9 kg (110 lb) 48.2 kg (106 lb 3.2 oz) 47.6 kg (105 lb)       2.  O2 sats > 90% on room air, or at prior home O2 therapy level.      Able to wean O2 this shift to keep sats above 90%?: Yes, satisfactory for discharge.   Does patient use Home O2? No          Current oxygenation status:   SpO2: 95 %     O2 Device: None (Room air),       3.  Tolerates ambulation and mobility near baseline.     Ambulation: No, further care required to meet this goal. Please explain Pt did not want to ambulate stating she is too weak and tired.    Times patient ambulated with staff this shift: 2, to the bathroom.     Please review the Heart Failure Care Map for additional HF goal outcomes.    Fermin Sanderson RN  10/21/2022

## 2022-10-23 NOTE — PROGRESS NOTES
[facility]    Medicine Progress Note - Hospitalist Service    Date of Admission:  10/19/2022    Assessment & Plan      86-year-old with history of hypertension, diastolic dysfunction, dementia presented with sudden onset shortness of breath.  proBNP is elevated.  Received IV Lasix, steroid, and nebs in the ER.  We will continue IV Lasix for now.  Ordered limited echocardiogram.     Acute exacerbation of HFpEF  EF 70% with LV outflow obstruction  Diuresed with IV Lasix, 10 pounds down since admission, euvolemic, and creatinine bumped to 2.6 from 2.09 on admission.  Holding diuretics since 10/22.  Low-salt diet     Accelerated hypertension, SBP above 200 on admission.  Cardiology recommending optimization of BP, avoiding ACE inhibitor/ARB/hydralazine as this agents can worsen outflow tract obstruction.  Titrate diltiazem as tolerated.  Cardiology added extra dose of Cardizem 10/23.     CKD 4  Baseline creatinine ranging from 1.7-2.0  Creatinine slightly worsened with diuresis.  Resumed PTA sodium bicarbonate.     Mild cognitive impairment  Continue home medications  Monitor urinary retention  OT evaluation    MDD.  Generalized anxiety disorder.  Trial of Xanax.  PTA on Wellbutrin, no history of smoking.    10/22 transition to Lexapro.D/W pharmacist.     Large hiatal hernia  Continue PPI     Diet: 2 Gram Sodium Diet Other - please comment    DVT Prophylaxis: Heparin SQ  [place] Catheter: Not present  Central Lines: None  Cardiac Monitoring: ACTIVE order. Indication: Acute decompensated heart failure (48 hours)  Code Status: Full Code      Disposition Plan      Expected Discharge Date: 10/24/2022      Destination: assisted living;home          The patient's care was discussed with the Bedside Nurse, Care Coordinator/, Patient and Patient's Family.    Jane Olivera MD  Hospitalist Service  [facility]  Securely message with the Vocera Web Console (learn more  here)  Text page via [facility] Paging/Directory   _____________________________________________________________________    Interval History   Patient was admitted 10/21 fall diastolic CHF exacerbation.  Was diuresed with IV Lasix, appears to be overdiuresis, given soft BP and creatinine bump.  S/p 500 mL NS bolus yesterday, due to symptomatic hypotension.  BP improved.  Remains elevated at 2.6.  Patient encouraged p.o. fluids.  Cardiology seen, recommendations reviewed.  Transitioned yesterday from Wellbutrin to Lexapro.  Anxiety already better.  I have called patient's daughter Awilda Jaimes, no answer, left VM with callback number.    Data reviewed today: I reviewed all medications, new labs and imaging results over the last 24 hours. I personally reviewed    Physical Exam   Vital Signs: Temp: 97.5  F (36.4  C) Temp src: Oral BP: 102/55 Pulse: 66   Resp: 18 SpO2: 97 % O2 Device: None (Room air)    Weight: 104 lbs 8 oz  General: Alert and oriented x 3. Not in obvious distress.  HEENT: NC, AT. Neck- supple, No JVP elevation, lymphadenopathy or thyromegaly. Trachea-central.  Chest: Clear to auscultation bilaterally.  Heart: S1S2 regular. No M/R/G.  Abdomen: Soft. NT, ND. No organomegaly. Bowel sounds- active.  Back: No spine tenderness. No CVA tenderness.  Extremities: No leg swelling. Peripheral pulses 2+ bilaterally.  Neuro: Cranial nerves 1-12 grossly normal. No focal neurological deficit    Data   Recent Labs   Lab 10/23/22  0445 10/22/22  0447 10/21/22  0548 10/19/22  2342   WBC  --  7.5 8.1 7.0   HGB  --  11.6* 11.5* 10.9*   MCV  --  97 98 98    272 268 254    136 136 141   POTASSIUM 4.0 4.1 4.1 5.0   CHLORIDE 103 101 103 109*   CO2 18* 19* 20* 19*   BUN 74.5* 66.4* 48.2* 34.5*   CR 2.62* 2.60* 2.15* 2.09*   ANIONGAP 15 16* 13 13   GISELE 9.3 9.5 9.8 9.8   * 93 99 98   ALBUMIN  --   --   --  4.2   PROTTOTAL  --   --   --  6.4   BILITOTAL  --   --   --  <0.2   ALKPHOS  --   --   --  118*   ALT  --   --    --  7*   AST  --   --   --  13     No results found for this or any previous visit (from the past 24 hour(s)).  Medications       acetaminophen  975 mg Oral TID     diltiazem ER  120 mg Oral Daily     dorzolamide-timolol  1 drop Both Eyes BID     escitalopram  10 mg Oral Daily     heparin ANTICOAGULANT  5,000 Units Subcutaneous Q12H     latanoprost  1 drop Both Eyes At Bedtime     mirtazapine  7.5 mg Oral At Bedtime     multivitamin  with lutein  1 capsule Oral Daily     pantoprazole  40 mg Oral QAM AC     QUEtiapine  25 mg Oral At Bedtime     senna-docusate  1 tablet Oral BID     sodium chloride (PF)  3 mL Intracatheter Q8H

## 2022-10-23 NOTE — PROGRESS NOTES
Care Management Follow Up    Length of Stay (days): 3    Expected Discharge Date: 10/24/2022     Concerns to be Addressed: discharge planning     Patient plan of care discussed at interdisciplinary rounds: Yes    Anticipated Discharge Disposition: Assisted Living     Anticipated Discharge Services:    Anticipated Discharge DME: [facility]    Patient/family educated on Medicare website which has current facility and service quality ratings: no  Education Provided on the Discharge Plan:    Patient/Family in Agreement with the Plan: yes    Referrals Placed by CM/SW:    Private pay costs discussed: Not applicable    Additional Information:  Pt comes from [facility] (P:[phone] F: [phone] [facility]); receiving assistance with meals, laundry, cleaning and medication assistance. Pt hopes to return home with resumption of services. Nabila stated that she feels comfortable with pt returning and requested that hospitalist order home PT/OT so facility can provide services.     Anticipate patient to return to [facility] tomorrow. Daughter Annia will transport.          Eda Feliciano

## 2022-10-23 NOTE — PLAN OF CARE
Heart Failure Care Map  GOALS TO BE MET BEFORE DISCHARGE:    1. Decrease congestion and/or edema with diuretic therapy to achieve near optimal volume status.     Dyspnea improved: Yes, satisfactory for discharge.   Edema improved: Yes, satisfactory for discharge.        Net I/O and Weights since admission:   09/23 1500 - 10/23 1459  In: 1040 [P.O.:1040]  Out: 4575 [Urine:4575]  Net: -3535     Vitals:    10/19/22 2247 10/20/22 1658 10/21/22 0500 10/22/22 0317   Weight: 49.9 kg (110 lb) 48.2 kg (106 lb 3.2 oz) 47.6 kg (105 lb) 45.2 kg (99 lb 11.2 oz)    10/23/22 0654   Weight: 47.4 kg (104 lb 8 oz)       2.  O2 sats > 90% on room air, or at prior home O2 therapy level.      Able to wean O2 this shift to keep sats above 90%?: Yes, satisfactory for discharge.   Does patient use Home O2? No          Current oxygenation status:   SpO2: 97 %     O2 Device: None (Room air),      3.  Tolerates ambulation and mobility near baseline.     Ambulation: Yes, satisfactory for discharge.   Times patient ambulated with staff this shift: 4    Pt ambulating to   and on unit with walker, less anxious today. VSS, no acute changes. Pt hoping to discharge back to [facility] tomorrow.     Please review the Heart Failure Care Map for additional HF goal outcomes.    Juanita Lopez RN  10/23/2022

## 2022-10-23 NOTE — PROGRESS NOTES
"Cardiology Progress Note    Assessment/Plan:    1. Heart failure with preserved ejection fraction secondary to hypertrophic cardiomyopathy.  Evidence by echo of hypertrophic cardiomyopathy with mild LVOT obstruction on previous evaluation 1 year ago.  Target tightly controlled heart rate (50-60 at rest), adequate volume intake without sodium bicarbonate supplement.    Avoid dehydration, furosemide.  2. Hypertensive heart disease with concentric left ventricular hypertrophy.  Blood pressure lower yesterday, did receive dose of furosemide.  No lightheadedness or symptoms.  Would resume increased dose diltiazem.    Encouraged to increase ambulation.  Likely ready for discharge tomorrow.  Suggest follow-up with Dr. Mancilla or me in 4 to 6 weeks    Principal Problem:    Acute on chronic diastolic heart failure (H)  Active Problems:    Chronic kidney disease, stage IV (severe) (H)    Major depressive disorder, single episode, moderate (H)    Generalized anxiety disorder     LOS: 2 days     Subjective:  Did not ambulate yesterday afternoon or today.  Denies shortness of breath, palpitations, lightheadedness, weakness.  Does feel \"foggy\" this a.m.    Objective:   Vital signs in last 24 hours:  Vitals:    10/22/22 2328 10/23/22 0359 10/23/22 0654 10/23/22 0731   BP: 110/56 136/65  (!) 149/68   BP Location: Left arm Left arm  Left arm   Patient Position:       Cuff Size:       Pulse: 69 63  60   Resp: 18 16  18   Temp: 97.3  F (36.3  C) 97.2  F (36.2  C)     TempSrc: Oral Axillary  Axillary   SpO2: 95% 96%  95%   Weight:   47.4 kg (104 lb 8 oz)    Height:         Weight:   Wt Readings from Last 3 Encounters:   10/23/22 47.4 kg (104 lb 8 oz)   03/15/22 49.9 kg (110 lb)   03/09/22 52.3 kg (115 lb 6.4 oz)           PHYSICAL EXAM    Marked kyphosis.  Respiratory:  Normal breath sounds, No respiratory distress, No wheezing, No chest tenderness.   Cardiovascular:   Normal heart rate, Normal rhythm, harsh 2/6 holosystolic murmur " radiating throughout the precordium.  No change with Valsalva.  3/6 apical blowing holosystolic murmur  GI:  Bowel sounds normal, Soft, No tenderness, No masses  Extremities: no edema       Cardiographics:   Telemetry  sinus rhythm, 60 to 80 bpm.  No pauses.    Echocardiogram 10/20/2022:  There is moderate to severe concentric left ventricular hypertrophy.  The echo findings are consistent with significant left ventricular outflow  obstruction.  Hyperdynamic left ventricular function  The visual ejection fraction is >70%.  There is systolic anterior motion of the mitral valve. There is mild to  moderate (1-2+) mitral regurgitation.      Imaging:  EXAM: CHEST SINGLE VIEW PORTABLE  LOCATION: [facility]  DATE/TIME: 10/20/2022 12:08 AM  INDICATION: Shortness of breath.  COMPARISON: 9/4/2021.  FINDINGS: A few curvilinear opacities scattered in the lungs likely represent scarring or atelectasis. The lungs are otherwise clear. Normal size cardiac silhouette. Atherosclerotic calcification in the thoracic aorta. Very large hiatal hernia again   noted.                                                               IMPRESSION:   1. No convincing evidence of active cardiopulmonary disease.  2. Very large hiatal hernia again noted.       Lab Results:   Lab Results   Component Value Date    WBC 7.5 10/22/2022    HGB 11.6 (L) 10/22/2022    HCT 36.7 10/22/2022     10/23/2022    CHOL 209 (H) 11/03/2021    TRIG 77 11/03/2021    HDL 86 11/03/2021    ALT 7 (L) 10/19/2022    AST 13 10/19/2022     10/23/2022    BUN 74.5 (H) 10/23/2022    CO2 18 (L) 10/23/2022    TSH 1.45 09/22/2021    INR 1.07 02/16/2022    MICROALBUR 12.45 (H) 07/09/2021     Lab Results   Component Value Date    TROPONINI 0.18 02/16/2022         Beau Youngblood MD [facility]  10/22/2022

## 2022-10-23 NOTE — PLAN OF CARE
Heart Failure Care Map  GOALS TO BE MET BEFORE DISCHARGE:    1. Decrease congestion and/or edema with diuretic therapy to achieve near optimal volume status.     Dyspnea improved: Yes, satisfactory for discharge.   Edema improved: Yes, satisfactory for discharge.        Net I/O and Weights since admission:   09/23 0700 - 10/23 0659  In: 990 [P.O.:990]  Out: 4075 [Urine:4075]  Net: -3085     Vitals:    10/19/22 2247 10/20/22 1658 10/21/22 0500 10/22/22 0317   Weight: 49.9 kg (110 lb) 48.2 kg (106 lb 3.2 oz) 47.6 kg (105 lb) 45.2 kg (99 lb 11.2 oz)       2.  O2 sats > 92% on room air, or at prior home O2 therapy level.      Able to wean O2 this shift to keep sats above 92%?: Yes, satisfactory for discharge.   Does patient use Home O2? No          Current oxygenation status:   SpO2: 96 %     O2 Device: None (Room air),      3.  Tolerates ambulation and mobility near baseline.     Ambulation: Yes, satisfactory for discharge.   Times patient ambulated with staff this shift: 0    Please review the Heart Failure Care Map for additional HF goal outcomes.    Pt's vital signs were stable overnight on room air. She c/o a headache at bedtime and was anxious so she received her PRN xanax and tramadol. She has denied pain the rest of the night. She has been NSR with a 1st degree AVB on telemetry. She is able to make needs known. Call light is within reach.     Christiana Logan RN  10/23/2022

## 2022-10-24 NOTE — PROGRESS NOTES
HEART CARE NOTE          Assessment/Recommendations     1. HFpEF/HOCM c/b acute cardiogenic pulmonary edema   Assessment / Plan    Near euvolemia on physical exam- continue to hold diuresis given KAM    Patient presented in hypertensive emergency with SBP > 200; this was likely the cause of acute cardiogenic pulmonary edema due to critically elevated systemic vascular resistance/afterload - continue to aggressively manage BP and HR    Will need strict BP control to prevent further episodes - avoid ACE-/ARB/ARNI/hydralazine as these agents can worsen outflow tract obstructions; titrate diltiazem (or other non-dihydropyridine) as tolerated; patient without adequate HR to add BBlocker    GDMT as detailed below; mainstay of treatment for HFpEF includes diuretics and adequate BP control (class I) and SGLT2-I (class 2a); additional medical therapy (ARNI, MRA, ARB) demonstrated less robust evidence for indication but may be considered per guideline recommendations (2b); no indication for BBlockers      Current Pharmacotherapy AHA Guideline-Directed Medical Therapy   Losartan - on hold 2/2 KAM and HOCM ARNI/ARB   Spironolactone - not started  MRA   SGLT2 inhibitor - not started SGLT2-I    Furosemide IV - on hold Loop diuretic       2. CKD stage 4  Assessment / Plan    Continue to monitor renal function closely while diuresing     3. Hypertensive urgency  Assessment / Plan    SPB >200 in admission; given HOCM - avoid ACE-/ARB/ARNI/hydralazine as these agents can worsen outflow tract obstructions; titrate diltiazem (or other non-dihydropyridine) as tolerated; patient without adequate HR to add BBlocker    BP showing improvement on current regimen       Ok for discharge from a cardiology standpoint. Cardiology team will sign-off for now. Please do not hesitate to consult us again if new questions or concerns arise. Follow-up appointment will be arranged by CORE/HF clinic.     History of Present Illness/Subjective      Ms.  "Holli Richardson is a 86 year old female with a PMHx significant for (per Dr. 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 Richardson denies any current cardiac complaints or concerns; Management plan as detailed above.      ECG: Personally reviewed. normal sinus rhythm, occasional PVC noted, unifocal.     ECHO (personnaly Reviewed):  There is moderate to severe concentric left ventricular hypertrophy.  The echo findings are consistent with significant left ventricular outflow  obstruction.  Hyperdynamic left ventricular function  The visual ejection fraction is >70%.  There is systolic anterior motion of the mitral valve. There is mild to  moderate (1-2+) mitral regurgitation.          Physical Examination Review of Systems   /63 (BP Location: Left arm)   Pulse 58   Temp 97.4  F (36.3  C) (Oral)   Resp 15   Ht 1.6 m (5' 3\")   Wt 47.4 kg (104 lb 8 oz)   SpO2 93%   BMI 18.51 kg/m    Body mass index is 18.51 kg/m .  Wt Readings from Last 3 Encounters:   10/23/22 47.4 kg (104 lb 8 oz)   03/15/22 49.9 kg (110 lb)   03/09/22 52.3 kg (115 lb 6.4 oz)     General Appearance:   no distress, normal body habitus   ENT/Mouth: membranes moist, no oral lesions or bleeding gums.      EYES:  no scleral icterus, normal conjunctivae   Neck: no carotid bruits or thyromegaly   Chest/Lungs:   lungs are clear to auscultation, no rales or wheezing, equal chest wall expansion    Cardiovascular:   Regular. Normal first and second heart sounds with no murmurs, rubs, or gallops; the carotid, radial and posterior tibial pulses are intact, no JVD or LE edema bilaterally    Abdomen:  no organomegaly, masses, bruits, or tenderness; bowel sounds are present   Extremities: no cyanosis or clubbing   Skin: no xanthelasma, warm.    Neurologic: alert and " calm     Psychiatric: alert and calm     A complete 10 systems ROS was reviewed  And is negative except what is listed in the HPI.          Medical History  Surgical History Family History Social History   Past Medical History:   Diagnosis Date     CHF (congestive heart failure) (H)      CKD (chronic kidney disease)      Dementia (H)      Depressive disorder      HLD (hyperlipidemia)      Hypertension     Past Surgical History:   Procedure Laterality Date     OPEN REDUCTION INTERNAL FIXATION HIP BIPOLAR Left 2/17/2022    Procedure: HEMIARTHROPLASTY, HIP, BIPOLAR;  Surgeon: Micah Marvin MD;  Location: [facility]     PICC DOUBLE LUMEN PLACEMENT  9/22/2021         no family history of premature coronary artery disease Social History     Socioeconomic History     Marital status:      Spouse name: Not on file     Number of children: Not on file     Years of education: Not on file     Highest education level: Not on file   Occupational History     Not on file   Tobacco Use     Smoking status: Unknown     Smokeless tobacco: Never   Substance and Sexual Activity     Alcohol use: Not on file     Drug use: Not on file     Sexual activity: Not on file   Other Topics Concern     Not on file   Social History Narrative     Not on file     Social Determinants of Health     Financial Resource Strain: Not on file   Food Insecurity: Not on file   Transportation Needs: Not on file   Physical Activity: Not on file   Stress: Not on file   Social Connections: Not on file   Intimate Partner Violence: Not on file   Housing Stability: Not on file           Lab Results    Chemistry/lipid CBC Cardiac Enzymes/BNP/TSH/INR   Lab Results   Component Value Date    CHOL 209 (H) 11/03/2021    HDL 86 11/03/2021    TRIG 77 11/03/2021    BUN 74.5 (H) 10/23/2022     10/23/2022    CO2 18 (L) 10/23/2022    Lab Results   Component Value Date    WBC 7.5 10/22/2022    HGB 11.6 (L) 10/22/2022    HCT 36.7 10/22/2022    MCV 97  10/22/2022     10/23/2022    Lab Results   Component Value Date    TROPONINI 0.18 02/16/2022     (H) 03/07/2022    TSH 1.45 09/22/2021    INR 1.07 02/16/2022     Lab Results   Component Value Date    TROPONINI 0.18 02/16/2022          Weight:    Wt Readings from Last 3 Encounters:   10/23/22 47.4 kg (104 lb 8 oz)   03/15/22 49.9 kg (110 lb)   03/09/22 52.3 kg (115 lb 6.4 oz)       Allergies  Allergies   Allergen Reactions     Statins-Hmg-Coa Reductase Inhibitors [Hmg-Coa-R Inhibitors] Muscle Pain (Myalgia)         Surgical History  Past Surgical History:   Procedure Laterality Date     OPEN REDUCTION INTERNAL FIXATION HIP BIPOLAR Left 2/17/2022    Procedure: HEMIARTHROPLASTY, HIP, BIPOLAR;  Surgeon: Micah Marvin MD;  Location: [facility] OR     PICC DOUBLE LUMEN PLACEMENT  9/22/2021            Social History  Tobacco:   History   Smoking Status     Unknown   Smokeless Tobacco     Never    Alcohol:   Social History    Substance and Sexual Activity      Alcohol use: Not on file   Illicit Drugs:   History   Drug Use Not on file       Family History  No family history on file.       Elva Mancilla MD on 10/24/2022      cc: Everardo English

## 2022-10-24 NOTE — PROGRESS NOTES
Care Management Discharge Note    Discharge Date: 10/24/2022       Discharge Disposition: Assisted Living- [facility]     Discharge Services:  meals, laundry, cleaning and medication assistance    Discharge DME: Walker    Discharge Transportation: family or friend will provide    Private pay costs discussed: Not applicable  Education Provided on the Discharge Plan:    Persons Notified of Discharge Plans: patient, facility- spoke to Campo  Patient/Family in Agreement with the Plan: yes    Handoff Referral Completed: Yes    Additional Information:  Chart reviewed. Patient has discharge orders and will return to her assisted living facility today.  SADA spoke to Campo at facility, confirmed discharge, Campo states understanding and agreement.   Discharge orders faxed to Fax: [phone].   Family will transport, SW spoke to daughter Angeli on the phone who will transport, daughter understands pt will need to be at facility prior to 2pm (per facility request.).      OUMOU Bolanos

## 2022-10-24 NOTE — PLAN OF CARE
Occupational Therapy Discharge Summary    Reason for therapy discharge:    Discharged to [facility]    Progress towards therapy goal(s). See goals on Care Plan in [facility] electronic health record for goal details.  Goals partially met.  Barriers to achieving goals:   discharge from facility.    Therapy recommendation(s):    Continued therapy is recommended.  Rationale/Recommendations:  Herminio OTZhang CARLOS 10/24/2022

## 2022-10-24 NOTE — PLAN OF CARE
Goal Outcome Evaluation:      Plan of Care Reviewed With: patient    Overall Patient Progress: improvingOverall Patient Progress: improving    Outcome Evaluation: Pt SR with 1avb.  Pt obsessed with having a BM today.  RN gave scheduled senna and prune juice.   Pt continues on RA.  Last bowel movement was yesterday, medium.

## 2022-10-24 NOTE — PROGRESS NOTES
Heart Failure Care Map  GOALS TO BE MET BEFORE DISCHARGE:    1. Decrease congestion and/or edema with diuretic therapy to achieve near optimal volume status.     Dyspnea improved: Yes, satisfactory for discharge.   Edema improved: Yes, satisfactory for discharge.        Net I/O and Weights since admission:   09/23 2300 - 10/23 2259  In: 1040 [P.O.:1040]  Out: 4925 [Urine:4925]  Net: -3885     Vitals:    10/19/22 2247 10/20/22 1658 10/21/22 0500 10/22/22 0317   Weight: 49.9 kg (110 lb) 48.2 kg (106 lb 3.2 oz) 47.6 kg (105 lb) 45.2 kg (99 lb 11.2 oz)    10/23/22 0654   Weight: 47.4 kg (104 lb 8 oz)       2.  O2 sats > 90% on room air, or at prior home O2 therapy level.      Able to wean O2 this shift to keep sats above 90%?: Yes, satisfactory for discharge.   Does patient use Home O2? No          Current oxygenation status:   SpO2: 95 %     O2 Device: None (Room air),      3.  Tolerates ambulation and mobility near baseline.     Ambulation: Yes, satisfactory for discharge.   Times patient ambulated with staff this shift: 4    Please review the Heart Failure Care Map for additional HF goal outcomes.    Fermin Sanderson RN  10/23/2022

## 2022-10-24 NOTE — DISCHARGE SUMMARY
[facility]  Hospitalist Discharge Summary      Date of Admission:  10/19/2022  Date of Discharge:  10/24/2022  Discharging Provider: Jane Olivera MD  Discharge Service: Hospitalist Service    Discharge Diagnoses     Acute on chronic diastolic CHF  Accelerated hypertension  CKD stage IV  Mild cognitive impairment  Generalized anxiety disorder    Follow-ups Needed After Discharge   Follow-up Appointments    Follow-up and recommended labs and tests      Follow up with primary care provider, Everardo English, within 7   days to evaluate medication change and for hospital follow- up.  The   following labs/tests are recommended: CBC, BMP, Mg.      Follow-up with Dr. Skinner in cardiology clinic in 3-4 weeks.       Discharge Disposition   Discharged to home  Condition at discharge: Stable    Hospital Course   86-year-old with history of hypertension, diastolic dysfunction, dementia presented with sudden onset shortness of breath.     Acute exacerbation of HFpEF  EF 70% with LV outflow obstruction.  proBNP 2500.  Diuresed with IV Lasix, 10 pounds down since admission, euvolemic, and creatinine bumped to 2.6 from 2.09 on admission.  Holding diuretics since 10/22. Low-salt diet.  Cardiology recommended tight BP control, avoid dehydration.  No diuretics for 48 hours prior to discharge, due to acute renal failure, overdiuresis with hypotension.  Patient to follow-up with cardiology, defer further diuretics to outpatient cardiology.     Accelerated hypertension, SBP above 200 on admission.  Cardiology recommending optimization of BP, avoiding ACE inhibitor/ARB/hydralazine as this agents can worsen outflow tract obstruction.  Titrate diltiazem as tolerated.    Cardizem CD dose increased from 9220 at discharge.     CKD 4  Baseline creatinine ranging from 1.7-2.0, with diuresis bump to 2.6.  Creatinine slightly worsened with diuresis.  Resumed PTA sodium bicarbonate.  S/p IVF.  No diuretics for 2  days prior to discharge and not discharged on diuretics.     Mild cognitive impairment, without behavioral problems.  Resides at assisted living facility.  Home health care ordered at discharge.     MDD.  Generalized anxiety disorder.  Short trial of Xanax during hospitalization.  PTA on Wellbutrin, no history of smoking.    10/22 transitioned to Lexapro 10 mg,anxiety improved.  Dose uptitrated as outpatient.     Large hiatal hernia- on PPI    Consultations This Hospital Stay   CORE CLINIC EVALUATION IP CONSULT  OCCUPATIONAL THERAPY ADULT IP CONSULT  NUTRITION SERVICES ADULT IP CONSULT  CARE MANAGEMENT / SOCIAL WORK IP CONSULT  PHYSICAL THERAPY ADULT IP CONSULT  CARDIOLOGY IP CONSULT    Code Status   Full Code    Time Spent on this Encounter   I, Jane Olivera MD, personally saw the patient today and spent greater than 30 minutes discharging this patient.     Jane Olivera MD  [facility] HEART [address] [address]  Phone: [phone]  Fax: [phone]  ______________________________________________________________________    Physical Exam   Vital Signs: Temp: 97.5  F (36.4  C) Temp src: Axillary BP: (!) 145/67 Pulse: 64   Resp: 18 SpO2: 95 % O2 Device: None (Room air)    Weight: 105 lbs 0 oz  General: Alert and oriented x 3. Not in obvious distress.  HEENT: NC, AT. Neck- supple, No JVP elevation, lymphadenopathy or thyromegaly. Trachea-central.  Chest: Clear to auscultation bilaterally.  Heart: S1S2 regular. No M/R/G.  Abdomen: Soft. NT, ND. No organomegaly. Bowel sounds- active.  Back: No spine tenderness. No CVA tenderness.  Extremities: No leg swelling. Peripheral pulses 2+ bilaterally.  Neuro: Cranial nerves 1-12 grossly normal. No focal neurological deficit       Primary Care Physician   Everardo English    Discharge Orders      CHF EDUCATION    CHF EDUCATION SHOULD INCLUDE THE FOLLOWING:    DAILY WEIGHTS  DIET / FLUID INTAKE  WHEN TO CALL  PCP  SMOKING CESSATION       Home Care Referral      Reason for your hospital stay    CHF exacerbation     Follow-up and recommended labs and tests     Follow up with primary care provider, Everardo English, within 7 days to evaluate medication change and for hospital follow- up.  The following labs/tests are recommended: CBC, BMP, Mg.      Follow-up with Dr. Skinner in cardiology clinic in 3-4 weeks.     Activity    Your activity upon discharge: activity as tolerated     Diet    Follow this diet upon discharge: Orders Placed This Encounter      2 Gram Sodium Diet Other - please comment     Significant Results and Procedures   Results for orders placed or performed during the hospital encounter of 10/19/22   XR Chest Port 1 View    Narrative    EXAM: CHEST SINGLE VIEW PORTABLE  LOCATION: [facility]  DATE/TIME: 10/20/2022 12:08 AM    INDICATION: Shortness of breath.  COMPARISON: 2021.    FINDINGS: A few curvilinear opacities scattered in the lungs likely represent scarring or atelectasis. The lungs are otherwise clear. Normal size cardiac silhouette. Atherosclerotic calcification in the thoracic aorta. Very large hiatal hernia again   noted.      Impression    IMPRESSION:   1. No convincing evidence of active cardiopulmonary disease.  2. Very large hiatal hernia again noted.    Echocardiogram Limited     Value    LVEF  >70%    Narrative    [id number]  [id number]  [id number]  [id number]^MELODY^[place] [address]     Name: MAXWELL TREJO  MRN: [id number]  : 1935  Study Date: 10/20/2022 10:27 AM  Age: 86 yrs  Gender: Female  Patient Location: [facility]  Reason For Study: Heart Failure, Unspecified  Ordering Physician: JESS OLIVER  Referring Physician: JESS OLIVER  Performed By: MELVI     BSA: 1.5 m2  Height: 63 in  Weight: 110 lb  HR: 85  ______________________________________________________________________________  Procedure  Limited  Echo Adult.  ______________________________________________________________________________  Interpretation Summary     There is moderate to severe concentric left ventricular hypertrophy.  The echo findings are consistent with significant left ventricular outflow  obstruction.  Hyperdynamic left ventricular function  The visual ejection fraction is >70%.  There is systolic anterior motion of the mitral valve. There is mild to  moderate (1-2+) mitral regurgitation.  ______________________________________________________________________________  Left Ventricle  The echo findings are consistent with left ventricular outflow obstruction.  There is moderate to severe concentric left ventricular hypertrophy.  Hyperdynamic left ventricular function. The visual ejection fraction is >70%.  No regional wall motion abnormalities noted.     Right Ventricle  Normal right ventricle size and systolic function.     Atria  Normal left atrial size. Right atrial size is normal. There is no color  Doppler evidence of an atrial shunt.     Mitral Valve  There is systolic anterior motion of the mitral valve. There is mild to  moderate (1-2+) mitral regurgitation. The mitral regurgitant jet is  eccentrically directed.     Tricuspid Valve  The tricuspid valve is not well visualized, but is grossly normal. There is  trace tricuspid regurgitation.     Aortic Valve  The aortic valve is trileaflet with aortic valve sclerosis. There is trace  aortic regurgitation.     Pulmonic Valve  The pulmonic valve is not well visualized. There is trace pulmonic valvular  regurgitation.     Vessels  The aorta root is normal. Normal size ascending aorta. IVC diameter <2.1 cm  collapsing >50% with sniff suggests a normal RA pressure of 3 mmHg.     Pericardium  There is no pericardial effusion.     ______________________________________________________________________________  MMode/2D Measurements & Calculations  IVSd: 1.6 cm  LVIDd: 3.3 cm  LVIDs: 1.8  cm  LVPWd: 1.2 cm  FS: 44.0 %     LV mass(C)d: 154.6 grams  LV mass(C)dI: 103.1 grams/m2  LVOT diam: 1.8 cm  LVOT area: 2.5 cm2  RWT: 0.72     Time Measurements  MM HR: 82.0 BPM     Doppler Measurements & Calculations  MV E max robin: 123.0 cm/sec  MV A max robin: 118.4 cm/sec  MV E/A: 1.0  MV max P.5 mmHg  MV mean P.2 mmHg  MV V2 VTI: 31.7 cm  MV dec slope: 384.3 cm/sec2  MV dec time: 0.32 sec  Ao V2 max: 228.1 cm/sec  Ao max P.0 mmHg  Ao V2 mean: 147.5 cm/sec  Ao mean P.8 mmHg  Ao V2 VTI: 42.5 cm     ______________________________________________________________________________  Report approved by: Amador Harry 10/20/2022 12:42 PM               Discharge Medications   Current Discharge Medication List      START taking these medications    Details   diltiazem ER COATED BEADS (CARDIZEM CD/CARTIA XT) 120 MG 24 hr capsule Take 1 capsule (120 mg) by mouth daily for 30 days  Qty: 30 capsule, Refills: 0    Associated Diagnoses: Acute on chronic congestive heart failure, unspecified heart failure type (H)      escitalopram (LEXAPRO) 10 MG tablet Take 1 tablet (10 mg) by mouth daily for 30 days  Qty: 30 tablet, Refills: 0    Associated Diagnoses: Generalized anxiety disorder      traMADol (ULTRAM) 50 MG tablet Take 0.5 tablets (25 mg) by mouth every 6 hours as needed for moderate pain  Qty: 15 tablet, Refills: 0    Associated Diagnoses: Pain of both shoulder joints         CONTINUE these medications which have CHANGED    Details   QUEtiapine (SEROQUEL) 25 MG tablet Take 1 tablet (25 mg) by mouth At Bedtime for 30 days  Qty: 30 tablet, Refills: 0    Associated Diagnoses: Generalized anxiety disorder         CONTINUE these medications which have NOT CHANGED    Details   acetaminophen (TYLENOL) 325 MG tablet Take 975 mg by mouth 3 times daily      cholecalciferol 50 MCG ( UT) tablet Take 2,000 Units by mouth daily      dorzolamide-timolol PF (COSOPT PF) 2-0.5 % opthalmic solutionh INSTILL 1 DROP  INTO THE LEFT EYE TWICE DAILY  Qty: 15 each, Refills: 0    Associated Diagnoses: Glaucoma suspect, bilateral      gemfibroziL (LOPID) 600 MG tablet [GEMFIBROZIL (LOPID) 600 MG TABLET] Take 600 mg by mouth 2 (two) times a day before meals.      latanoprost (XALATAN) 0.005 % ophthalmic solution Place 1 drop into both eyes At Bedtime       mirtazapine (REMERON) 7.5 MG tablet Take 7.5 mg by mouth At Bedtime      multivitamin  with lutein (OCUVITE WITH LUTEIN) CAPS per capsule Take 1 capsule by mouth daily      omeprazole (PRILOSEC) 20 MG DR capsule Take 20 mg by mouth daily      Probiotic Product (PROBIOTIC-10 PO) Take 1 tablet by mouth daily      senna-docusate (SENOKOT-S/PERICOLACE) 8.6-50 MG tablet Take 1 tablet by mouth 2 times daily    Associated Diagnoses: Hip fracture, left, closed, initial encounter (H); Fall, initial encounter      sodium bicarbonate 650 MG tablet Take 1 tablet (650 mg) by mouth 2 times daily  Qty: 60 tablet, Refills: 0    Associated Diagnoses: Stage 4 chronic kidney disease (H)         STOP taking these medications       buPROPion (WELLBUTRIN XL) 150 MG 24 hr tablet Comments:   Reason for Stopping:         diltiazem ER (CARDIZEM SR) 90 MG 12 hr capsule Comments:   Reason for Stopping:             Allergies   Allergies   Allergen Reactions     Statins-Hmg-Coa Reductase Inhibitors [Hmg-Coa-R Inhibitors] Muscle Pain (Myalgia)

## 2022-10-24 NOTE — PLAN OF CARE
Problem: Heart Failure  Goal: Stable Heart Rate and Rhythm  Outcome: Progressing  Goal: Optimal Functional Ability  Intervention: Optimize Functional Ability  Goal: Effective Oxygenation and Ventilation  Outcome: Progressing  Intervention: Optimize Oxygenation and Ventilation  Goal: Effective Breathing Pattern During Sleep  Outcome: Progressing  Intervention: Monitor and Manage Obstructive Sleep Apnea  - vital signs stable. On room air. Oxygen saturation >92%.     Problem: Risk for Delirium  Goal: Improved Behavioral Control  Intervention: Minimize Safety Risk  Goal: Improved Sleep  Outcome: Progressing  - slept most of the shift.

## 2022-10-25 NOTE — ED NOTES
"ED Triage Provider Note  [facility] EMERGENCY DEPARTMENT  Encounter Date: Oct 25, 2022    History:  Chief Complaint   Patient presents with     Urinary Retention     Stage 4 CKD     Holli Richardson is a 86 year old female here for evaluation of decreased urine output.  She was recently hospitalized for CHF.    Review of Systems:  No systemic signs of illness    Exam:  BP (!) 144/69   Pulse 73   Temp 98.4  F (36.9  C) (Temporal)   Resp 16   Ht 1.6 m (5' 3\")   Wt 47.6 kg (105 lb)   SpO2 98%   BMI 18.60 kg/m    General: No acute distress. Appears stated age.   Cardio: normal rate, extremities well perfused  Resp: Normal work of breathing, grossly normal respiratory rate  Neuro: Alert. Facial movement grossly symmetric. Grossly intact strength.       Medical Decision Making:  Patient arriving to the ED with problem as above. A medical screening exam was performed. Bladder scan ordered.     orders initiated from Triage. The patient is most appropriate to return to the waiting room.       Anand Nichole MD  10/25/2022 at 6:45 PM     Anand Nichole MD  10/25/22 1846    "

## 2022-10-25 NOTE — PROGRESS NOTES
[facility]    Background: Transitional Care Management program auto-identified and prompting a chart review by [facility] team.    Assessment: Upon chart review, [facility] Team member will cancel/close this episode of Transitional Care Management program due to reason below:    Patient has discharged to a Memory Care, Nursing Home, Assisted Living or Group Home where patient is receiving on-site support with their daily cares, including support with hospital follow up plan.    Plan: Transitional Care Management episode closed per reason above.      REGINA Solano  [facility] [place]    *Connected Care Resource Team does NOT follow patient ongoing. Referrals are identified based on internal discharge reports and the outreach is to ensure patient has an understanding of their discharge instructions.

## 2022-10-25 NOTE — ED TRIAGE NOTES
Pt recently discharged from the hospital.  Pt has been unable to void for past 24 hours.  This was an issue prior to discharge as well but pt voided before being discharged to [facility].  Pt was herer for CHF, and has a hx of CKD stage 4     Triage Assessment     Row Name 10/25/22 4874       Triage Assessment (Adult)    Airway WDL WDL       Respiratory WDL    Respiratory WDL WDL       Skin Circulation/Temperature WDL    Skin Circulation/Temperature WDL WDL       Cardiac WDL    Cardiac WDL WDL       Peripheral/Neurovascular WDL    Peripheral Neurovascular WDL WDL       Cognitive/Neuro/Behavioral WDL    Cognitive/Neuro/Behavioral WDL WDL

## 2022-10-26 NOTE — ED PROVIDER NOTES
NAME: Holli Richardson  AGE: 86 year old female  YOB: 1935  MRN: [id number]  EVALUATION DATE & TIME: 10/25/2022  8:08 PM    PCP: Everardo English    ED PROVIDER: Matthew Simmons M.D.      Chief Complaint   Patient presents with     Urinary Retention     Stage 4 CKD     FINAL IMPRESSION:  1. Decreased urination    2. Dehydration        MEDICAL DECISION MAKIN:00 PM I met with the patient, obtained history, performed an initial exam, and discussed options and plan for diagnostics and treatment here in the ED.   10:11 PM Patient was able to successfully void.   10:19 PM Patient and family requests to be discharged as they will look at the patient's results online.    Patient was clinically assessed and consented to treatment. After assessment, medical decision making and workup were discussed with the patient. The patient was agreeable to plan for testing, workup, and treatment.  Pertinent Labs & Imaging studies reviewed. (See chart for details)     Medical Decision Making    Supplemental history from: Family Member    External Record(s) Reviewed: Inpatient Record and Outpatient Record    Differential Diagnosis: See MDM charting for differential considered.     I performed an independent interpretation of the: N/A    Discussed with radiology regarding test interpretation: N/A    Discussion of management with another provider: N/A    The following testing was considered but ultimately not selected: None     I considered prescription management with: N/A    The patient's care impacted: None, Chronic Kidney Disease, Coronary Heart Disease, Dementia and Hypertension    Consideration of Admission/Observation: N/A - Patient admitted or discharged without consideration for admission    Care significantly affected by Social Determinants of Health including: N/A    Holli Richardson is a 86 year old female who presents with urinary retention.   Differential diagnosis includes but not limited to  worsening kidney failure, urinary retention, urinary tract infection, dehydration.  Patient with recent admission for CHF and diuresis.  Patient was slightly over diuresed when she was here before per notes and now with reduced urine output over the last 24 hours since getting out of hospital.  Patient does not have any lower abdominal fullness or tenderness and does not report any urgency to urinate.  She did however have some elevation in creatinine and we will recheck this.  Labs done from triage showed stable CBC and findings of consistent creatinine compared to prior.  Patient with no symptoms of shortness of breath, chest pain, or signs of worsening CHF.  She has not been drinking great deal of water and has only produced small amount of urine at home with no discomfort or urinary symptoms.  After discussion with patient and family feel this is likely decreased urine output secondary to the diuresis and dehydration.  We will trial small dose of fluids IV and 500 mL was given.  After which patient was able to produce urine however did not wish to wait for the results and would rather go home and have the phone call.  Patient otherwise asymptomatic and I feel this is likely not urinary retention but reduced urine output secondary to previous diuresis and slight dehydration.  Patient tolerating oral intake and will be plan for discharge home with follow-up to her primary doctor to recheck creatinine as needed but will be recommended for antibiotics if urine shows any signs of urine infection.  Urine did return with bacteria in the urine but no signs of acute pyuria or urine infection.  Likely contamination and patient will be planned for discharge at this time with follow-up for recheck of kidney function and urine output.    0 minutes of critical care time    MEDICATIONS GIVEN IN THE EMERGENCY:  Medications   0.9% sodium chloride BOLUS (0 mLs Intravenous Stopped 10/25/22 2142)       NEW PRESCRIPTIONS STARTED AT  TODAY'S ER VISIT:  Discharge Medication List as of 10/25/2022 10:29 PM             =================================================================    HPI    Patient information was obtained from: Patient and Family    Use of : N/A      Per chart review patient was seen here on 10/19/2022 for evaluation of shortness of breath. Patient was found to have acute exacerbation of HFpEF. Diuretics were held due to acute renal failure and over diuresis with hypotension. Patient was discharged on 10/24/2022 with instructions to follow-up with cardiology.     Holli Richardson is a 86 year old female with a past medical history of HTN, CHF, CKD4, HLD, GERD, JU, dementia, who presents with urinary retention. Patient was recently discharged from the hospital yesterday for CHF. [facility] had their provider come in today. Provider noticed that patient has not been urinating much since being discharged from the hospital. At 1700, staff noticed patient still had not produced much urine and patient was bought to the ED for further evaluation. Patient denies dysuria, leg swelling, shortness of breath, or chest pain. Patient states that she normally does not drink much water at baseline and has not consumed much water since discharge. Patient denies fever, abdominal pain, any additional complains a this time.      REVIEW OF SYSTEMS   Review of Systems   Constitutional: Negative for fever.   Respiratory: Negative for shortness of breath.    Cardiovascular: Negative for chest pain and leg swelling.   Gastrointestinal: Negative for abdominal pain.   Genitourinary: Positive for difficulty urinating. Negative for dysuria.   All other systems reviewed and are negative.       PAST MEDICAL HISTORY:  Past Medical History:   Diagnosis Date     CHF (congestive heart failure) (H)      CKD (chronic kidney disease)      Dementia (H)      Depressive disorder      HLD (hyperlipidemia)      Hypertension        PAST SURGICAL  HISTORY:  Past Surgical History:   Procedure Laterality Date     OPEN REDUCTION INTERNAL FIXATION HIP BIPOLAR Left 2/17/2022    Procedure: HEMIARTHROPLASTY, HIP, BIPOLAR;  Surgeon: Micah Marvin MD;  [facility]     [facility]  [facility]/20[facility]            CURRENT MEDICATIONS:    No current facility-administered medications for this encounter.    Current Outpatient Medications:      acetaminophen (TYLENOL) 325 MG tablet, Take 975 mg by mouth 3 times daily, Disp: , Rfl:      cholecalciferol 50 MCG (2000 UT) tablet, Take 2,000 Units by mouth daily, Disp: , Rfl:      diltiazem ER COATED BEADS (CARDIZEM CD/CARTIA XT) 120 MG 24 hr capsule, Take 1 capsule (120 mg) by mouth daily for 30 days, Disp: 30 capsule, Rfl: 0     dorzolamide-timolol PF (COSOPT PF) 2-0.5 % opthalmic solutionh, INSTILL 1 DROP INTO THE LEFT EYE TWICE DAILY, Disp: 15 each, Rfl: 0     escitalopram (LEXAPRO) 10 MG tablet, Take 1 tablet (10 mg) by mouth daily for 30 days, Disp: 30 tablet, Rfl: 0     gemfibroziL (LOPID) 600 MG tablet, [GEMFIBROZIL (LOPID) 600 MG TABLET] Take 600 mg by mouth 2 (two) times a day before meals., Disp: , Rfl:      latanoprost (XALATAN) 0.005 % ophthalmic solution, Place 1 drop into both eyes At Bedtime , Disp: , Rfl:      mirtazapine (REMERON) 7.5 MG tablet, Take 7.5 mg by mouth At Bedtime, Disp: , Rfl:      multivitamin  with lutein (OCUVITE WITH LUTEIN) CAPS per capsule, Take 1 capsule by mouth daily, Disp: , Rfl:      omeprazole (PRILOSEC) 20 MG DR capsule, Take 20 mg by mouth daily, Disp: , Rfl:      Probiotic Product (PROBIOTIC-10 PO), Take 1 tablet by mouth daily, Disp: , Rfl:      QUEtiapine (SEROQUEL) 25 MG tablet, Take 1 tablet (25 mg) by mouth At Bedtime for 30 days, Disp: 30 tablet, Rfl: 0     senna-docusate (SENOKOT-S/PERICOLACE) 8.6-50 MG tablet, Take 1 tablet by mouth 2 times daily, Disp: , Rfl:      sodium bicarbonate 650 MG tablet, Take 1 tablet (650 mg) by mouth 2 times daily,  "Disp: 60 tablet, Rfl: 0     traMADol (ULTRAM) 50 MG tablet, Take 0.5 tablets (25 mg) by mouth every 6 hours as needed for moderate pain, Disp: 15 tablet, Rfl: 0    ALLERGIES:  Allergies   Allergen Reactions     Statins-Hmg-Coa Reductase Inhibitors [Hmg-Coa-R Inhibitors] Muscle Pain (Myalgia)       FAMILY HISTORY:  History reviewed. No pertinent family history.    SOCIAL HISTORY:   Social History     Socioeconomic History     Marital status:      Spouse name: None     Number of children: None     Years of education: None     Highest education level: None   Tobacco Use     Smoking status: Unknown     Smokeless tobacco: Never       PHYSICAL EXAM:    Vitals: BP (!) 140/65   Pulse 66   Temp 98.4  F (36.9  C) (Temporal)   Resp 28   Ht 1.6 m (5' 3\")   Wt 47.6 kg (105 lb)   SpO2 98%   BMI 18.60 kg/m     Physical Exam  Vitals and nursing note reviewed.   Constitutional:       General: She is not in acute distress.     Appearance: Normal appearance. She is normal weight. She is not ill-appearing or toxic-appearing.   HENT:      Head: Normocephalic.      Mouth/Throat:      Mouth: Mucous membranes are dry.   Cardiovascular:      Rate and Rhythm: Normal rate and regular rhythm.      Heart sounds: Normal heart sounds.   Pulmonary:      Effort: Pulmonary effort is normal. No respiratory distress.      Breath sounds: Normal breath sounds.   Abdominal:      General: There is no distension.      Palpations: Abdomen is soft.      Tenderness: There is no abdominal tenderness. There is no right CVA tenderness, left CVA tenderness, guarding or rebound.      Hernia: No hernia is present.   Musculoskeletal:         General: No swelling.      Right lower leg: No edema.      Left lower leg: No edema.   Skin:     General: Skin is warm and dry.   Neurological:      General: No focal deficit present.      Mental Status: She is alert.   Psychiatric:         Behavior: Behavior normal.           LAB:  All pertinent labs reviewed and " interpreted.  Labs Ordered and Resulted from Time of ED Arrival to Time of ED Departure   COMPREHENSIVE METABOLIC PANEL - Abnormal       Result Value    Sodium 136      Potassium 4.0      Chloride 102      Carbon Dioxide (CO2) 19 (*)     Anion Gap 15      Urea Nitrogen 56.9 (*)     Creatinine 2.45 (*)     Calcium 10.1      Glucose 107 (*)     Alkaline Phosphatase 104      AST 15      ALT 9 (*)     Protein Total 6.5      Albumin 4.1      Bilirubin Total 0.2      GFR Estimate 19 (*)    CBC WITH PLATELETS AND DIFFERENTIAL - Abnormal    WBC Count 5.2      RBC Count 3.62 (*)     Hemoglobin 11.2 (*)     Hematocrit 34.5 (*)     MCV 95      MCH 30.9      MCHC 32.5      RDW 14.2      Platelet Count 279      % Neutrophils 49      % Lymphocytes 35      % Monocytes 11      % Eosinophils 4      % Basophils 1      % Immature Granulocytes 0      NRBCs per 100 WBC 0      Absolute Neutrophils 2.6      Absolute Lymphocytes 1.8      Absolute Monocytes 0.6      Absolute Eosinophils 0.2      Absolute Basophils 0.0      Absolute Immature Granulocytes 0.0      Absolute NRBCs 0.0             I, Joesph Forrest, am serving as a scribe to document services personally performed by Dr. Matthew Simmons  based on my observation and the provider's statements to me. I, Matthew Simmons MD attest that Joesph Forrest is acting in a scribe capacity, has observed my performance of the services and has documented them in accordance with my direction.      Matthew Simmons M.D.  Emergency Medicine  [facility]     Matthew Simmons MD  10/26/22 0342

## 2022-11-14 NOTE — ED PROVIDER NOTES
EMERGENCY DEPARTMENT ENCOUNTER      NAME: Holli Richardson  AGE: 86 year old female  YOB: 1935  MRN: [id number]  EVALUATION DATE & TIME: 11/14/2022 12:45 PM    PCP: Everardo English    ED PROVIDER: Bashir Rowan MD        Chief Complaint   Patient presents with     Pharyngitis     Dehydration     Cough         FINAL IMPRESSION:  1. Thrush    2. Dehydration    3. Generalized muscle weakness          ED COURSE & MEDICAL DECISION MAKING:    Pertinent Labs & Imaging studies reviewed. (See chart for details)  86 year old female presents to the Emergency Department for evaluation of generalized weakness and decreased oral appetite over the last 7 days.    ED Course as of 11/14/22 1849   Mon Nov 14, 2022   1548 Patient presents from nursing home with complaint of mouth pain and decreased appetite and decreased energy and concern for failure to thrive.  Third ER visit in 1 month   1548 Labs show chronic kidney disease that is actually improving as well as some mild non-anion gap acidosis.  Acidosis seems to be stable per chart review.  Does have mild leukocytosis   1548 Chest x-ray negative for pneumonia.  UA pending   1548 Is neck Are likely secondary to generalized weakness and mild acidosis and less likely pulmonary emboli or heart failure but will obtain D-dimer   1549 Magnesium(!): 1.6  We will give IV magnesium   1549 Troponin T, High Sensitivity(!): 33  Stable, EKG no changes.  ACS unlikely   1549 Rapid Strep A Screen: Negative   1549 Possible thrush.  We will send wet prep   1549 Discussed admission for observation which patient is currently considering   1549 Patient would like to be home however family really wants patients in the hospital   1624 D-Dimer Quantitative(!): 0.70  Wells low risk normal age-adjusted D-dimer   1847 Exam suggestive of thrush.  Wet prep positive for yeast.  Given nystatin.   1847 UA shows moderate bacteria but no UTI symptoms   1847 Patient ultimately decided she  wanted to be admitted for failure to thrive   1847 Given IV fluids and tachypnea resolved   1847 This x-ray does not show evidence of pneumonia.  Normal age-adjusted D-dimer makes PE unlikely   1848 Discussed with the hospitalist who discussed with the resident service     2:25 PM I met with the patient, obtained history, performed an initial exam, and discussed options and plan for diagnostics and treatment here in the ED. PPE worn: n95 mask, gloves   5:39 PM I updated the patient and her son. Patient wants to be admitted.  5:56 PM I discussed the case with hospitalist, Dr Multani, who accepts the patient. Stated that patient is a resident's patient, but no need to call resident.     Medical Decision Making    Supplemental history from: Family Member    External Record(s) Reviewed: Outpatient Record    Differential Diagnosis: See MDM charting for differential considered.     I performed an independent interpretation of the: EKG: See my documentation.    Discussed with radiology regarding test interpretation: N/A    Discussion of management with another provider: See chart documentation, if applicable and Hospitalist    The following testing was considered but ultimately not selected: None    I considered prescription management with: Other:Nystatin    The patient's care impacted: None    Consideration of Admission/Observation: N/A - Patient admitted    Care significantly affected by Social Determinants of Health including: N/A          At the conclusion of the encounter I discussed the results of all of the tests and the disposition. The questions were answered. The patient or family acknowledged understanding and was agreeable with the care plan.       MEDICATIONS GIVEN IN THE EMERGENCY:  Medications   0.9% sodium chloride BOLUS (0 mLs Intravenous Stopped 11/14/22 1518)   magnesium sulfate 2 g in water intermittent infusion (0 g Intravenous Stopped 11/14/22 1652)   nystatin (MYCOSTATIN) suspension 500,000 Units  "(500,000 Units Swish & Swallow Given 11/14/22 1824)       NEW PRESCRIPTIONS STARTED AT TODAY'S ER VISIT  New Prescriptions    No medications on file          =================================================================    HPI    Triage note  \"Patient arrives with EMS from [facility] with complaints of sore throat and failure to thrive. EMS reports patient has not been able to eat the last 7 days and has only had minimal amount of Ensure and water. Tachypneic, reporting SOB, sats stable in high 90s on RA. Patient is alert and oriented x4.       \"      Patient information was obtained from: Patient and Son    Use of : N/A        Holli Richardson is a 86 year old female with a pertinent history of CHF, hypertension, CKD, acute renal failure, dementia, hyperlipidemia, s/p PICC/midline placement who presents to this ED by ambulance for evaluation of sore throat and failure to thrive.    Per son, the patient has had multiple ED visits for the past couple of weeks. She was diagnosed with CHF and has been having issues with dehydration and fluids. Last week, the patient developed a sore throat. The son notes that her sore throat improved, but came back again, prompting them to be present in the ED. Her son adds that the patient has not been eating or drinking normally since her sore throat developed again. Here in the ED, the patient denies chest pain, leg swelling, shortness of breath, or pain with deep breaths. However, she endorses pain when swallowing. She is not taking steroids and does not have a history of DM. She did have a cough last week, but this has since improved. She otherwise denies fevers, chills, and any other symptoms or complaints at this time. No history of DVT or PE.       REVIEW OF SYSTEMS   Review of Systems   Constitutional: Positive for appetite change and fatigue. Negative for chills and fever.   HENT: Positive for sore throat and trouble swallowing (pain).  "   Respiratory: Positive for cough (improved). Negative for shortness of breath.    Cardiovascular: Negative for chest pain and leg swelling.   Gastrointestinal: Negative for abdominal pain.   Genitourinary: Negative for dysuria.   Musculoskeletal: Negative for myalgias.   Allergic/Immunologic: Positive for immunocompromised state.   Neurological: Positive for weakness.   Psychiatric/Behavioral: Negative for agitation.       PAST MEDICAL HISTORY:  Past Medical History:   Diagnosis Date     CHF (congestive heart failure) (H)      CKD (chronic kidney disease)      Dementia (H)      Depressive disorder      HLD (hyperlipidemia)      Hypertension        PAST SURGICAL HISTORY:  Past Surgical History:   Procedure Laterality Date     OPEN REDUCTION INTERNAL FIXATION HIP BIPOLAR Left 2/17/2022    Procedure: HEMIARTHROPLASTY, HIP, BIPOLAR;  Surgeon: Micah Marvin MD;  Location: [facility] [place]     PICC DOUBLE LUMEN PLACEMENT  9/22/2021                CURRENT MEDICATIONS:    acetaminophen (TYLENOL) 325 MG tablet  cholecalciferol 50 MCG (2000 UT) tablet  diltiazem ER COATED BEADS (CARDIZEM CD/CARTIA XT) 120 MG 24 hr capsule  dorzolamide-timolol PF (COSOPT PF) 2-0.5 % opthalmic solutionh  GAVILAX 17 GM/SCOOP powder  gemfibroziL (LOPID) 600 MG tablet  latanoprost (XALATAN) 0.005 % ophthalmic solution  mirtazapine (REMERON) 7.5 MG tablet  multivitamin  with lutein (OCUVITE WITH LUTEIN) CAPS per capsule  omeprazole (PRILOSEC) 20 MG DR capsule  Probiotic Product (PROBIOTIC-10 PO)  QUEtiapine (SEROQUEL) 25 MG tablet  senna-docusate (SENOKOT-S/PERICOLACE) 8.6-50 MG tablet  sodium bicarbonate 650 MG tablet  UNISOM SLEEPTABS 25 MG TABS tablet        ALLERGIES:  Allergies   Allergen Reactions     Statins-Hmg-Coa Reductase Inhibitors [Hmg-Coa-R Inhibitors] Muscle Pain (Myalgia)       FAMILY HISTORY:  History reviewed. No pertinent family history.    SOCIAL HISTORY:   Social History     Socioeconomic History     Marital status:  "   Tobacco Use     Smoking status: Unknown     Smokeless tobacco: Never       VITALS:  /63   Pulse 61   Temp 98.1  F (36.7  C) (Oral)   Resp 22   Ht 1.575 m (5' 2\")   Wt 47.6 kg (105 lb)   SpO2 97%   BMI 19.20 kg/m      PHYSICAL EXAM      Vitals: /63   Pulse 61   Temp 98.1  F (36.7  C) (Oral)   Resp 22   Ht 1.575 m (5' 2\")   Wt 47.6 kg (105 lb)   SpO2 97%   BMI 19.20 kg/m    General: Appears in no acute distress, awake, alert, interactive. Generalized weakness.   Eyes: Conjunctivae non-injected. Sclera anicteric.  HENT: Atraumatic. White plague to tongue and roof of mouth that is easy to scrape off.   Neck: Supple.  Respiratory/Chest: Mild tachypnea.   Abdomen: non distended  Musculoskeletal: Normal extremities. No edema or erythema.  Skin: Normal color. No rash or diaphoresis.  Neurologic: Face symmetric, moves all extremities spontaneously. Speech clear.  Psychiatric:. Affect appropriate.       LAB:  All pertinent labs reviewed and interpreted.  Results for orders placed or performed during the hospital encounter of 11/14/22   XR Chest Port 1 View    Impression    IMPRESSION: No signs of pneumonia or failure. Into thoracic stomach due to a large hiatal hernia again noted. Heart is of normal size. Bones are demineralized. Multiple old left rib fractures. Severe degenerative changes in the right shoulder again   noted. Mild S-shaped thoracolumbar rotoscoliosis.   Extra Blue Top Tube   Result Value Ref Range    Hold Specimen JIC    Extra Red Top Tube   Result Value Ref Range    Hold Specimen JIC    Extra Green Top (Lithium Heparin) Tube   Result Value Ref Range    Hold Specimen JIC    Extra Purple Top Tube   Result Value Ref Range    Hold Specimen JIC    Basic metabolic panel   Result Value Ref Range    Sodium 140 136 - 145 mmol/L    Potassium 3.7 3.4 - 5.3 mmol/L    Chloride 112 (H) 98 - 107 mmol/L    Carbon Dioxide (CO2) 17 (L) 22 - 29 mmol/L    Anion Gap 11 7 - 15 mmol/L    Urea " Nitrogen 23.3 (H) 8.0 - 23.0 mg/dL    Creatinine 1.83 (H) 0.51 - 0.95 mg/dL    Calcium 9.1 8.8 - 10.2 mg/dL    Glucose 101 (H) 70 - 99 mg/dL    GFR Estimate 27 (L) >60 mL/min/1.73m2   Result Value Ref Range    Magnesium 1.6 (L) 1.7 - 2.3 mg/dL   Result Value Ref Range    Troponin T, High Sensitivity 33 (H) <=14 ng/L   Hepatic function panel   Result Value Ref Range    Protein Total 5.5 (L) 6.4 - 8.3 g/dL    Albumin 3.1 (L) 3.5 - 5.2 g/dL    Bilirubin Total <0.2 <=1.2 mg/dL    Alkaline Phosphatase 124 (H) 35 - 104 U/L    AST 8 (L) 10 - 35 U/L    ALT 6 (L) 10 - 35 U/L    Bilirubin Direct <0.20 0.00 - 0.30 mg/dL   UA with Microscopic reflex to Culture    Specimen: Urine, Clean Catch   Result Value Ref Range    Color Urine Colorless Colorless, Straw, Light Yellow, Yellow    Appearance Urine Clear Clear    Glucose Urine Negative Negative mg/dL    Bilirubin Urine Negative Negative    Ketones Urine Negative Negative mg/dL    Specific Gravity Urine 1.013 1.001 - 1.030    Blood Urine Negative Negative    pH Urine 6.5 5.0 - 7.0    Protein Albumin Urine 30 (A) Negative mg/dL    Urobilinogen Urine <2.0 <2.0 mg/dL    Nitrite Urine Negative Negative    Leukocyte Esterase Urine Negative Negative    Bacteria Urine Moderate (A) None Seen /HPF    RBC Urine 0 <=2 /HPF    WBC Urine 5 <=5 /HPF    Squamous Epithelials Urine <1 <=1 /HPF    Hyaline Casts Urine 1 <=2 /LPF   Symptomatic; Unknown Influenza A/B & SARS-CoV2 (COVID-19) Virus PCR Multiplex Nose    Specimen: Nose; Swab   Result Value Ref Range    Influenza A PCR Negative Negative    Influenza B PCR Negative Negative    RSV PCR Negative Negative    SARS CoV2 PCR Negative Negative   CBC with platelets and differential   Result Value Ref Range    WBC Count 11.3 (H) 4.0 - 11.0 10e3/uL    RBC Count 3.76 (L) 3.80 - 5.20 10e6/uL    Hemoglobin 11.7 11.7 - 15.7 g/dL    Hematocrit 36.7 35.0 - 47.0 %    MCV 98 78 - 100 fL    MCH 31.1 26.5 - 33.0 pg    MCHC 31.9 31.5 - 36.5 g/dL    RDW 15.1  (H) 10.0 - 15.0 %    Platelet Count 383 150 - 450 10e3/uL   D dimer quantitative   Result Value Ref Range    D-Dimer Quantitative 0.70 (H) 0.00 - 0.50 ug/mL FEU   Manual Differential   Result Value Ref Range    % Neutrophils 72 %    % Lymphocytes 19 %    % Monocytes 7 %    % Eosinophils 0 %    % Basophils 0 %    % Metamyelocytes 2 %    Absolute Neutrophils 8.1 1.6 - 8.3 10e3/uL    Absolute Lymphocytes 2.1 0.8 - 5.3 10e3/uL    Absolute Monocytes 0.8 0.0 - 1.3 10e3/uL    Absolute Eosinophils 0.0 0.0 - 0.7 10e3/uL    Absolute Basophils 0.0 0.0 - 0.2 10e3/uL    Absolute Metamyelocytes 0.2 (H) <=0.0 10e3/uL    RBC Morphology Confirmed RBC Indices     Platelet Assessment  Automated Count Confirmed. Platelet morphology is normal.     Automated Count Confirmed. Platelet morphology is normal.    [place] Cells Moderate (A) None Seen    Elliptocytes Slight (A) None Seen   Streptococcus A Rapid Scr w Reflx to PCR    Specimen: Throat; Swab   Result Value Ref Range    Group A Strep antigen Negative Negative   Group A Streptococcus PCR Throat Swab    Specimen: Throat; Swab   Result Value Ref Range    Group A strep by PCR Not Detected Not Detected   Wet prep    Specimen: Vagina; Swab   Result Value Ref Range    Trichomonas Absent Absent    Yeast Present (A) Absent    Clue Cells Absent Absent    WBCs/high power field 1+ (A) None       RADIOLOGY:  Reviewed all pertinent imaging. Please see official radiology report.  XR Chest Port 1 View   Final Result   IMPRESSION: No signs of pneumonia or failure. Into thoracic stomach due to a large hiatal hernia again noted. Heart is of normal size. Bones are demineralized. Multiple old left rib fractures. Severe degenerative changes in the right shoulder again    noted. Mild S-shaped thoracolumbar rotoscoliosis.          EKG:    Performed at: 14-NOV-2022 14:03:16    Impression: Abnormal ECG     Rate: 62 bpm  Rhythm: Sinus rhythm with Premature atrial complexes   Axis: 7  KS Interval: 178 ms   QRS  Interval: 100 ms   QTc Interval: 412 ms  ST Changes: No ST changes  Comparison: When compared with ECG of 19-OCT-2022 22:43, Premature ventricular complexes are no longer Present. Premature atrial complexes are now Present. Minimal criteria for Inferior infarct are no longer Present. Left ventricular hypertrophy with repolarization abnormality.     I have independently reviewed and interpreted the EKG(s) documented above.        I, Buzz Miles, am serving as a scribe to document services personally performed by Ramon Rowan MD based on my observation and the provider's statements to me. I, Dr. Ramon Rowan, attest that Buzz Miles is acting in a scribe capacity, has observed my performance of the services and has documented them in accordance with my direction.    Ramon Rowan MD  Emergency Medicine  [facility] EMERGENCY DEPARTMENT  [address] [place] [id number]  [phone]     Ramon Rowan MD  11/14/22 [id number]

## 2022-11-14 NOTE — ED NOTES
Pt ask to get up to bathroom stating she cannot urinate using purewick. This writter offer bedside commode, pt pivot with assist of 1. Pt is unsteady on gait.

## 2022-11-14 NOTE — ED TRIAGE NOTES
Patient arrives with EMS from [facility] with complaints of sore throat and failure to thrive. EMS reports patient has not been able to eat the last 7 days and has only had minimal amount of Ensure and water. Tachypneic, reporting SOB, sats stable in high 90s on RA. Patient is alert and oriented x4.

## 2022-11-14 NOTE — ED NOTES
Bed: JNED-22  Expected date: 11/14/22  Expected time:   Means of arrival: Ambulance  Comments:  WALDO  86 F  FTT

## 2022-11-15 NOTE — PLAN OF CARE
"  Problem: Plan of Care - These are the overarching goals to be used throughout the patient stay.    Goal: Plan of Care Review  Description: The Plan of Care Review/Shift note should be completed every shift.  The Outcome Evaluation is a brief statement about your assessment that the patient is improving, declining, or no change.  This information will be displayed automatically on your shift note.  Outcome: Met  Goal: Patient-Specific Goal (Individualized)  Description: You can add care plan individualizations to a care plan. Examples of Individualization might be:  \"Parent requests to be called daily at 9am for status\", \"I have a hard time hearing out of my right ear\", or \"Do not touch me to wake me up as it startles me\".  Outcome: Met  Goal: Absence of Hospital-Acquired Illness or Injury  Outcome: Met  Intervention: Identify and Manage Fall Risk  Recent Flowsheet Documentation  Taken 11/15/2022 1135 by Savi Connelly RN  Safety Promotion/Fall Prevention:   activity supervised   clutter free environment maintained   increased rounding and observation   lighting adjusted   patient and family education   room door open   safety round/check completed  Taken 11/15/2022 0800 by Savi Connelly RN  Safety Promotion/Fall Prevention:   activity supervised   clutter free environment maintained   increased rounding and observation   lighting adjusted   patient and family education   room door open   safety round/check completed  Intervention: Prevent Skin Injury  Recent Flowsheet Documentation  Taken 11/15/2022 1135 by Savi Connelly RN  Body Position: turned  Taken 11/15/2022 0800 by Savi Connelly RN  Body Position: turned  Intervention: Prevent and Manage VTE (Venous Thromboembolism) Risk  Recent Flowsheet Documentation  Taken 11/15/2022 1135 by Savi Connelly RN  VTE Prevention/Management: SCDs (sequential compression devices) on  Taken 11/15/2022 0800 by Savi Connelly RN  VTE Prevention/Management: SCDs " (sequential compression devices) on  Goal: Optimal Comfort and Wellbeing  Outcome: Met  Goal: Readiness for Transition of Care  Outcome: Met     Problem: Pain Acute  Goal: Optimal Pain Control and Function  Outcome: Met  Intervention: Prevent or Manage Pain  Recent Flowsheet Documentation  Taken 11/15/2022 1135 by Savi Connelly RN  Medication Review/Management: medications reviewed  Taken 11/15/2022 0800 by Savi Connelly RN  Medication Review/Management: medications reviewed   Goal Outcome Evaluation:                  Pt and her son received all of her discharge paperwork and belongings. Pt left the unit at around 1350

## 2022-11-15 NOTE — H&P
[facility]    History and Physical - [facility]      Date of Admission:  11/14/2022    Assessment & Plan      Holli Richardson is a 86 year old female admitted on 11/14/2022.  She has a past medical history significant for HFpEF, CKD 4, mild cognitive impairment.  She is admitted for failure to thrive in the setting of oral thrush.    Oral candidiasis  2 weeks of pain with swallowing leading to poor oral intake.  Exam consistent with oral candidiasis, wet prep positive for yeast.  Likely etiology of pain.  Infectious work-up otherwise unremarkable.  Will treat with clotrimazole lozenges 5 times daily.  -Clotrimazole lozenges 5 times daily  -Advance diet as tolerated.  -Likely discharge in the next 1 to 2 days.    Failure to thrive  In the setting of poor oral intake.  Likely in setting of oral candidiasis as above.  Received 500 mL bolus in the ED.  Does live in a senior home.  Suspect once her sore throat improves, she will be able to increase oral intake.  Could also consider a component of cognitive decline as well. We will have PT OT evaluate.  -Encourage p.o. intake  -PT OT evaluation  -Care according to consult    History of HFpEF.  Admission in October 2022 for acute exacerbation.  LVEF of 70% with significant left ventricular outflow obstruction.  Does have some pitting edema lower extremities, does not appear overtly hypervolemic.  Dry weight of 105 pounds based on recent admission.  Currently weighs 105 pounds.  Not on diuretics as an outpatient.  -Continue to monitor.    Accelerated hypertension  Blood pressure not significantly elevated in the hospital.  On Cardizem.  Avoiding ACE/ARB/hydralazine as they can worsen outflow tract obstruction.  -Continue Cardizem 120 mg daily.     Chronic kidney disease stage IV  Baseline range from 1.7-2.0.  Creatinine measuring 1.83 on admission, down from 2.11 at last check.  -BMP in the  a.m.    Hypomagnesemia  1.6 on admission.  Received IV replacement in the ED  -Replacement protocol      Leukocytosis  White count of 11.3.  Showing metamyelocytes.  Possibly in the setting of oral candidiasis.  UA with bacteria present, does not appear overtly infected.  Asymptomatic.  No other signs or symptoms of infection.  We will repeat CBC in a.m.  -CBC in the a.m.    Elevated D-dimer  Normal age-adjusted D-dimer.  No tachycardia hypoxia.  Low suspicion for PE.    Elevated troponin  Measured 33.  Stable from admission 3 weeks prior.  EKG without any evidence of ischemia.  No chest pain.  Low suspicion for ACS.    Chronic conditions  GERD -continue pantoprazole 40 mg daily  Depression -continue mirtazapine 7.5 mg daily and Seroquel 25 mg daily  Hyperlipidemia -hold gemfibrozil  Hold eyedrops     Diet: Regular Diet Adult  DVT Prophylaxis: Pneumatic Compression Devices  Livingston Catheter: Not present  Fluids: None - promote oral intake  Central Lines: None  Cardiac Monitoring: None  Code Status: No CPR- Do NOT Intubate    Clinically Significant Risk Factors Present on Admission            # Hypomagnesemia: Lowest Mg = 1.6 mg/dL (Ref range: 1.7-2.3) in last 2 days, will replace as needed   # Hypoalbuminemia: Lowest albumin = 3.1 g/dL (Ref range: 3.5-5.2) at 11/14/2022  1:13 PM, will monitor as appropriate       # Dementia: noted on problem list           Disposition Plan      Expected Discharge Date: 11/15/2022                The patient's care was discussed with the attending physician at morning report.    Matthew Singh MD  Hospitalist Service  [facility]  Securely message with the Vocera Web Console (learn more here)  Text page via [facility] Paging/Directory       ______________________________________________________________________    Chief Complaint   Sore throat, failure to thrive.    History is obtained from the patient and son who was present.    History of Present Illness   Holli  STEFANIA Richardson is a 86 year old female who is admitted with failure to thrive in the setting of oral thrush.    About 1 week ago, developed a sore throat.  This has prevented her from eating or drinking much.  Only able to drink little bit of water and protein shakes.  Denies any difficulty swallowing, just painful.  Currently lives at a senior living facility.  Denies any fever or chills.  No chest pain or shortness of breath.    Recent HFpEF diagnosis with admission in October for exacerbation.  Diuresed well.  Had decreased oral intake following discharge from the hospital due to concerns for repeat exacerbation.  He was seen in the ED on 10/25/2022 for dehydration.  Since that time has tried to increase her oral intake as able.    There was discussion of discharge from the ED.  However patient wanted to be admitted to the hospital for further evaluation and management.  Hopeful to return to prior living arrangement once her symptoms improve.      Review of Systems    The 10 point Review of Systems is negative other than noted in the HPI     Past Medical History    I have reviewed this patient's medical history and updated it with pertinent information if needed.   Past Medical History:   Diagnosis Date     CHF (congestive heart failure) (H)      CKD (chronic kidney disease)      Dementia (H)      Depressive disorder      HLD (hyperlipidemia)      Hypertension         Past Surgical History   I have reviewed this patient's surgical history and updated it with pertinent information if needed.  Past Surgical History:   Procedure Laterality Date     OPEN REDUCTION INTERNAL FIXATION HIP BIPOLAR Left 2/17/2022    Procedure: HEMIARTHROPLASTY, HIP, BIPOLAR;  Surgeon: Micah Marvin MD;  Location: [facility] [place], [place] OR     PICC DOUBLE LUMEN PLACEMENT  9/22/2021             Social History   I have reviewed this patient's social history and updated it with pertinent information if needed. Holli Richardson  reports that she has  an unknown smoking status. She has never used smokeless tobacco.    Family History   Family history reviewed.  No pertinent family history.    Prior to Admission Medications   Prior to Admission Medications   Prescriptions Last Dose Informant Patient Reported? Taking?   GAVILAX 17 GM/SCOOP powder Unknown at PRN  Yes Yes   Sig: Take 17 g by mouth daily as needed for constipation   Probiotic Product (PROBIOTIC-10 PO) 11/14/2022 at 0721  Yes Yes   Sig: Take 1 tablet by mouth daily   QUEtiapine (SEROQUEL) 25 MG tablet 11/13/2022 at 1917  No Yes   Sig: Take 1 tablet (25 mg) by mouth At Bedtime for 30 days   UNISOM SLEEPTABS 25 MG TABS tablet Unknown at HS  Yes Yes   Sig: Take 12.5 mg by mouth At Bedtime   acetaminophen (TYLENOL) 325 MG tablet 11/14/2022 at 1128  Yes Yes   Sig: Take 975 mg by mouth 3 times daily   cholecalciferol 50 MCG (2000 UT) tablet 11/14/2022 at 0721  Yes Yes   Sig: Take 2,000 Units by mouth daily   diltiazem ER COATED BEADS (CARDIZEM CD/CARTIA XT) 120 MG 24 hr capsule 11/14/2022 at 0721  No Yes   Sig: Take 1 capsule (120 mg) by mouth daily for 30 days   dorzolamide-timolol PF (COSOPT PF) 2-0.5 % opthalmic solutionh 11/14/2022 at 0721  No Yes   Sig: INSTILL 1 DROP INTO THE LEFT EYE TWICE DAILY   Patient taking differently: Place 1 drop Into the left eye 2 times daily   gemfibroziL (LOPID) 600 MG tablet 11/14/2022 at 0721  Yes Yes   Sig: [GEMFIBROZIL (LOPID) 600 MG TABLET] Take 600 mg by mouth 2 (two) times a day before meals.   latanoprost (XALATAN) 0.005 % ophthalmic solution 11/13/2022 at 1856  Yes Yes   Sig: Place 1 drop into both eyes At Bedtime    mirtazapine (REMERON) 7.5 MG tablet 11/13/2022 at 1856  Yes Yes   Sig: Take 7.5 mg by mouth At Bedtime   multivitamin  with lutein (OCUVITE WITH LUTEIN) CAPS per capsule 11/14/2022 at 0721  Yes Yes   Sig: Take 1 capsule by mouth daily   omeprazole (PRILOSEC) 20 MG DR capsule 11/14/2022 at 0721  Yes Yes   Sig: Take 20 mg by mouth daily before breakfast    senna-docusate (SENOKOT-S/PERICOLACE) 8.6-50 MG tablet 11/14/2022 at 0721  No Yes   Sig: Take 1 tablet by mouth 2 times daily   sodium bicarbonate 650 MG tablet 11/14/2022 at 0721  No Yes   Sig: Take 1 tablet (650 mg) by mouth 2 times daily      Facility-Administered Medications: None     Allergies   Allergies   Allergen Reactions     Statins-Hmg-Coa Reductase Inhibitors [Hmg-Coa-R Inhibitors] Muscle Pain (Myalgia)       Physical Exam   Vital Signs: Temp: 98.2  F (36.8  C) Temp src: Oral BP: (!) 150/62 Pulse: 64   Resp: 16 SpO2: 98 % O2 Device: None (Room air)    Weight: 105 lbs 0 oz    General Appearance: Cachectic.  Alert oriented to person place and time.  Appears comfortable.  Eyes: Pupils equal round reactive light.  Extraocular muscles intact.  HEENT: White plaques noted on the tongue and oropharynx.  No other lesions  Respiratory: Cough respiratory for.  Clear to auscultation bilaterally.  No crackles or wheezing.  Cardiovascular: Regular rate rhythm.  2/6 systolic murmur.  GI: Nondistended.  Bowel sounds present.  Nontender to palpation.  Skin: No obvious rashes or lesions.  Musculoskeletal: Moves all 4 extremities.  2+ pitting edema to the shins.  Neurologic: Oriented to person place time.  Extraocular muscle intact.  Pupils equal round reactive to light.  No facial droopiness.  Sensation intact.  Psychiatric: Neutral affect.  Normal judgment.  Good insight.    Data   Data reviewed today: I reviewed all medications, new labs and imaging results over the last 24 hours.    Recent Labs   Lab 11/14/22  1313   WBC 11.3*   HGB 11.7   MCV 98         POTASSIUM 3.7   CHLORIDE 112*   CO2 17*   BUN 23.3*   CR 1.83*   ANIONGAP 11   GISELE 9.1   *   ALBUMIN 3.1*   PROTTOTAL 5.5*   BILITOTAL <0.2   ALKPHOS 124*   ALT 6*   AST 8*

## 2022-11-15 NOTE — PROGRESS NOTES
11/15/22 0849   Living Environment   People in Home alone   Current Living Arrangements assisted living   Home Accessibility no concerns   Living Environment Comments Pt walks to meals   Self-Care   Current Activity Tolerance poor   Equipment Currently Used at Home walker, rolling;grab bar, toilet;grab bar, tub/shower   Fall history within last six months no   General Information   Onset of Illness/Injury or Date of Surgery 11/14/22   Referring Physician Little   Existing Precautions/Restrictions fall   Cognitive Status Examination   Orientation Status person;place  (Nov, 2022)   Cognitive Status Comments Pt has mild cognitive imipairment at baseline   Visual Perception   Visual Impairment/Limitations WFL   Sensory   Sensory Quick Adds sensation intact   Range of Motion Comprehensive   Comment, General Range of Motion Limited ROM R shoulder   Strength Comprehensive (MMT)   Comment, General Manual Muscle Testing (MMT) Assessment generalized weakness B UE's   Bed Mobility   Comment (Bed Mobility) SBA supine to sit and sit to supine   Transfers   Transfer Comments SBA/CGA   Balance   Balance Comments SBA with FWW   Activities of Daily Living   BADL Assessment/Intervention   (SBA basic ADLs)   Clinical Impression   Criteria for Skilled Therapeutic Interventions Met (OT) Evaluation only   OT Diagnosis Impaired ADL independence   OT Problem List-Impairments impacting ADL activity tolerance impaired;range of motion (ROM);strength   Assessment of Occupational Performance 1-3 Performance Deficits   Clinical Decision Making Complexity (OT) low complexity   Risk & Benefits of therapy have been explained participants included;patient;evaluation/treatment results reviewed   Clinical Impression Comments Pt seen bedside for OT eval.  Pt is SBA/CGA with mobiltiy and ADLs.  Pt likely at baseline for ADLs and mobiltiy.  No further skilled OT indicated.   OT Total Evaluation Time   OT Eval, Low Complexity Minutes (96523) 15    Therapy Certification   Start of Care Date 11/15/22   Certification date from 11/15/22   Certification date to 11/22/22   Medical Diagnosis Failure to Thrive   OT Discharge Planning   OT Plan D/C OT   OT Discharge Recommendation (DC Rec) home with assist   OT Rationale for DC Rec Recommend return to [facility].  Pt at baseline for ADLs and mobiltiy.   OT Brief overview of current status SBA/CGA with FWW                                                                                 [facility]      OUTPATIENT OCCUPATIONAL THERAPY  EVALUATION  PLAN OF TREATMENT FOR OUTPATIENT REHABILITATION  (COMPLETE FOR INITIAL CLAIMS ONLY)  Patient's Last Name, First Name, M.I.  YOB: 1935  Holli Richardson                          Provider's Name  [facility] Medical Record No.  [id number]                               Onset Date:  (P) 11/14/22   Start of Care Date:  (P) 11/15/22     Type:     ___PT   _X_OT   ___SLP Medical Diagnosis:  (P) Failure to Thrive                        OT Diagnosis:  (P) Impaired ADL independence   Visits from SOC:  1   _________________________________________________________________________________  Plan of Treatment/Functional Goals    Planned Interventions:     Goals: See Occupational Therapy Goals on Care Plan in Psychiatric electronic health record.    Therapy Frequency:    Predicted Duration of Therapy Intervention:    _________________________________________________________________________________    I CERTIFY THE NEED FOR THESE SERVICES FURNISHED UNDER        THIS PLAN OF TREATMENT AND WHILE UNDER MY CARE     (Physician co-signature of this document indicates review and certification of the therapy plan).              Certification date from: (P) 11/15/22, Certification date to: (P) 11/22/22    Referring Physician: Singh (P)            Initial Assessment        See Occupational Therapy evaluation dated (P) 11/15/22 in Psychiatric  electronic health record.

## 2022-11-15 NOTE — PLAN OF CARE
"  Problem: Plan of Care - These are the overarching goals to be used throughout the patient stay.    Goal: Plan of Care Review  Description: The Plan of Care Review/Shift note should be completed every shift.  The Outcome Evaluation is a brief statement about your assessment that the patient is improving, declining, or no change.  This information will be displayed automatically on your shift note.  Outcome: Progressing  Flowsheets (Taken 11/15/2022 0251)  Plan of Care Reviewed With: patient  Overall Patient Progress: improving  Goal: Patient-Specific Goal (Individualized)  Description: You can add care plan individualizations to a care plan. Examples of Individualization might be:  \"Parent requests to be called daily at 9am for status\", \"I have a hard time hearing out of my right ear\", or \"Do not touch me to wake me up as it startles me\".  Outcome: Progressing  Goal: Absence of Hospital-Acquired Illness or Injury  Outcome: Progressing  Intervention: Identify and Manage Fall Risk  Recent Flowsheet Documentation  Taken 11/15/2022 0000 by Cyndy Beckford RN  Safety Promotion/Fall Prevention:   activity supervised   clutter free environment maintained   increased rounding and observation   lighting adjusted   patient and family education   room door open   safety round/check completed  Intervention: Prevent Skin Injury  Recent Flowsheet Documentation  Taken 11/15/2022 0000 by Cyndy Beckford RN  Body Position: turned  Intervention: Prevent and Manage VTE (Venous Thromboembolism) Risk  Recent Flowsheet Documentation  Taken 11/15/2022 0000 by Cyndy Beckford RN  VTE Prevention/Management: SCDs (sequential compression devices) on  Goal: Optimal Comfort and Wellbeing  Outcome: Progressing  Goal: Readiness for Transition of Care  Outcome: Progressing   Goal Outcome Evaluation:      Plan of Care Reviewed With: patient    Overall Patient Progress: improvingOverall Patient Progress: improving           "

## 2022-11-15 NOTE — PLAN OF CARE
PRIMARY DIAGNOSIS: ACUTE PAIN  OUTPATIENT/OBSERVATION GOALS TO BE MET BEFORE DISCHARGE:  1. Pain Status: Pain free.    2. Return to near baseline physical activity: Yes    3. Cleared for discharge by consultants (if involved): No    Discharge Planner Nurse   Safe discharge environment identified: Yes  Barriers to discharge: Yes       Entered by: Savi Connelly RN 11/15/2022 9:17 AM     Please review provider order for any additional goals.   Nurse to notify provider when observation goals have been met and patient is ready for discharge.Goal Outcome Evaluation:

## 2022-11-15 NOTE — PROGRESS NOTES
Care Management Discharge Note    Discharge Date: 11/15/2022       Discharge Disposition:  [facility] [phone]    Discharge Services:  none    Discharge DME:  None    Discharge Transportation:  Family, son Wilmer will be here at 2pm    Private pay costs discussed: family transport back to assisted living    PAS Confirmation Code:    Patient/family educated on Medicare website which has current facility and service quality ratings:      Education Provided on the Discharge Plan:    Persons Notified of Discharge Plans: dtr Annia and son Wilmer  Patient/Family in Agreement with the Plan:      Handoff Referral Completed: Yes    Additional Information:  Both children are aware of situation, pt has thrush and will need a few days to want to eat and must work with facility for food preference. No new therapy svcs         Gurjit Marie RN

## 2022-11-15 NOTE — PHARMACY-ADMISSION MEDICATION HISTORY
Pharmacy Note - Admission Medication History    Pertinent Provider Information: n/a     ______________________________________________________________________    Prior To Admission (PTA) med list completed and updated in EMR.       PTA Med List   Medication Sig Last Dose     acetaminophen (TYLENOL) 325 MG tablet Take 975 mg by mouth 3 times daily 11/14/2022 at 1128     cholecalciferol 50 MCG (2000 UT) tablet Take 2,000 Units by mouth daily 11/14/2022 at 0721     diltiazem ER COATED BEADS (CARDIZEM CD/CARTIA XT) 120 MG 24 hr capsule Take 1 capsule (120 mg) by mouth daily for 30 days 11/14/2022 at 0721     dorzolamide-timolol PF (COSOPT PF) 2-0.5 % opthalmic solutionh INSTILL 1 DROP INTO THE LEFT EYE TWICE DAILY (Patient taking differently: Place 1 drop Into the left eye 2 times daily) 11/14/2022 at 0721     GAVILAX 17 GM/SCOOP powder Take 17 g by mouth daily as needed for constipation Unknown at PRN     gemfibroziL (LOPID) 600 MG tablet [GEMFIBROZIL (LOPID) 600 MG TABLET] Take 600 mg by mouth 2 (two) times a day before meals. 11/14/2022 at 0721     latanoprost (XALATAN) 0.005 % ophthalmic solution Place 1 drop into both eyes At Bedtime  11/13/2022 at 1856     mirtazapine (REMERON) 7.5 MG tablet Take 7.5 mg by mouth At Bedtime 11/13/2022 at 1856     multivitamin  with lutein (OCUVITE WITH LUTEIN) CAPS per capsule Take 1 capsule by mouth daily 11/14/2022 at 0721     omeprazole (PRILOSEC) 20 MG DR capsule Take 20 mg by mouth daily before breakfast 11/14/2022 at 0721     Probiotic Product (PROBIOTIC-10 PO) Take 1 tablet by mouth daily 11/14/2022 at 0721     QUEtiapine (SEROQUEL) 25 MG tablet Take 1 tablet (25 mg) by mouth At Bedtime for 30 days 11/13/2022 at 1917     senna-docusate (SENOKOT-S/PERICOLACE) 8.6-50 MG tablet Take 1 tablet by mouth 2 times daily 11/14/2022 at 0721     sodium bicarbonate 650 MG tablet Take 1 tablet (650 mg) by mouth 2 times daily 11/14/2022 at 0721     UNISOM SLEEPTABS 25 MG TABS tablet Take  12.5 mg by mouth At Bedtime Unknown at HS       Information source(s): Facility (TCU/NH/) medication list/MAR and CareEverywhere/SureScripts  Method of interview communication: N/A    Summary of Changes to PTA Med List  New: Miralax, Unisom  Discontinued: Lexapro, Tramadol  Changed: none    Patient was asked about OTC/herbal products specifically.  PTA med list reflects this.    In the past week, patient estimated taking medication this percent of the time:  greater than 90%.    Allergies were reviewed, assessed, and updated with the patient.      Patient did not bring any medications to the hospital and can't retrieve from home. No multi-dose medications are available for use during hospital stay.  From facility    The information provided in this note is only as accurate as the sources available at the time of the update(s).    Thank you for the opportunity to participate in the care of this patient.    Staci Campos [facility]  11/14/2022 6:26 PM

## 2022-11-15 NOTE — PLAN OF CARE
Occupational Therapy Discharge Summary    Reason for therapy discharge:    All goals and outcomes met, no further needs identified.    Progress towards therapy goal(s). See goals on Care Plan in [facility] electronic health record for goal details.  Goals met    Therapy recommendation(s):    EUGENE allen only.  Pt at baseline for ADLs.  Recommend return to [facility].3

## 2022-11-15 NOTE — PROGRESS NOTES
[facility]  OUTPATIENT PHYSICAL THERAPY EVALUATION  PLAN OF TREATMENT FOR OUTPATIENT REHABILITATION  (COMPLETE FOR INITIAL CLAIMS ONLY)  Patient's Last Name, First Name, M.I.  YOB: 1935  MishaHolli william                        Provider's Name  [facility] Medical Record No.  [id number]                             Onset Date:  11/14/22   Start of Care Date:  11/15/22   Type:     _X_PT   ___OT   ___SLP Medical Diagnosis:  failure to thrive              PT Diagnosis:    Visits from SOC:  1     See note for plan of treatment, functional goals and certification details    I CERTIFY THE NEED FOR THESE SERVICES FURNISHED UNDER        THIS PLAN OF TREATMENT AND WHILE UNDER MY CARE     (Physician co-signature of this document indicates review and certification of the therapy plan).                     11/15/22 0868   Appointment Info   Signing Clinician's Name / Credentials (PT) Xin Escudero PT   Quick Adds   Quick Adds Certification       Present no   Living Environment   People in Home alone   Current Living Arrangements assisted living   Home Accessibility no concerns   Living Environment Comments pt reports ambulates no AD in apartment uses 4WW outside apartment   Self-Care   Equipment Currently Used at Home other (see comments)  (4WW-uses outside apartment)   Fall history within last six months no   General Information   Onset of Illness/Injury or Date of Surgery 11/14/22   Referring Physician Dr. Gabriele Ford   Patient/Family Therapy Goals Statement (PT) does not want to be here   Pertinent History of Current Problem (include personal factors and/or comorbidities that impact the POC) admit failure to thrive-oral thrush pt has h/o CKD, mild cog impairment, HF, HTN dementia   Existing Precautions/Restrictions no known precautions/restrictions   General Observations pt in ER bed sleeping   Cognition    Affect/Mental Status (Cognition) WFL   Orientation Status (Cognition) oriented x 3   Pain Assessment   Patient Currently in Pain No   Range of Motion (ROM)   ROM Comment BLE WFL   Strength (Manual Muscle Testing)   Strength Comments BLE WFL   Bed Mobility   Comment, (Bed Mobility) pt I in/out of ER bed   Transfers   Comment, (Transfers) pt mod I with FWW   Gait/Stairs (Locomotion)   [place] Level (Gait) modified independence;set up   Assistive Device (Gait) walker, front-wheeled   Distance in Feet (Required for LE Total Joints) 20'   Pattern (Gait) swing-through   Comment, (Gait/Stairs) pt cold, did not want to go further. Pt reports gait feels normal   Balance   Balance Comments no LOB   Clinical Impression   Criteria for Skilled Therapeutic Intervention Evaluation only   Clinical Presentation (PT Evaluation Complexity) Stable/Uncomplicated   Clinical Presentation Rationale Pt presents medically diagnosed   Clinical Decision Making (Complexity) low complexity   Anticipated Equipment Needs at Discharge (PT) other (see comments)  (pt has 4WW at home)   Risk & Benefits of therapy have been explained evaluation/treatment results reviewed   Clinical Impression Comments pt able to move in room with setup/ mod I   PT Total Evaluation Time   PT Eval, Low Complexity Minutes (19121) 13   Therapy Certification   Start of care date 11/15/22   Certification date from 11/15/22   Certification date to 11/15/22   Medical Diagnosis failure to thrive   Physical Therapy Goals   PT Frequency Other (see comments)  (eval only)   PT Predicted Duration/Target Date for Goal Attainment 11/15/22   PT: Goal 1 eval only for recommendations for safety at discharge-met   PT Discharge Planning   PT Plan PT eval only, pt continue with mobility with setup/ mod I with FWW   PT Discharge Recommendation (DC Rec) home  ([facility])   PT Rationale for DC Rec pt mod I to set up with trasnfers and gait   PT Brief overview of current status I bed  mob, SBA/mod I with sit<>stand FW, ambulate 20' FWW mod I   Total Session Time   Total Session Time (sum of timed and untimed services) 13

## 2022-11-15 NOTE — CONSULTS
Care Management Initial Consult    General Information  Assessment completed with: Patient, Children, pt and dtr Majo Shi  Type of CM/SW Visit: Initial Assessment    Primary Care Provider verified and updated as needed: Yes   Readmission within the last 30 days: no previous admission in last 30 days   Return Category: Progression of disease  Reason for Consult: discharge planning  Advance Care Planning: Advance Care Planning Reviewed: verified with patient, no concerns identified (POSLT sent to [facility], family has POA and HCD)          Communication Assessment  Patient's communication style: spoken language (English or Bilingual)             Cognitive  Cognitive/Neuro/Behavioral:                        Living Environment:   People in home: alone     Current living Arrangements: assisted living  Name of Facility: [id number]   Able to return to prior arrangements: yes       Family/Social Support:  Care provided by: other (see comments)  Provides care for: no one, no one, unable/limited ability to care for self  Marital Status: Single  Children          Description of Support System: Supportive, Involved    Support Assessment: Adequate family and caregiver support, Adequate social supports    Current Resources:   Patient receiving home care services: No     Community Resources: Skilled Nursing Facility  Equipment currently used at home: walker, rolling  Supplies currently used at home: Other (glasses)    Employment/Financial:  Employment Status:          Financial Concerns: No concerns identified   Referral to Financial Worker: No       Lifestyle & Psychosocial Needs:  Social Determinants of Health     Tobacco Use: Unknown     Smoking Tobacco Use: Unknown     Smokeless Tobacco Use: Never     Passive Exposure: Not on file   Alcohol Use: Not on file   Financial Resource Strain: Not on file   Food Insecurity: Not on file   Transportation Needs: Not on file   Physical Activity: Not on file    Stress: Not on file   Social Connections: Not on file   Intimate Partner Violence: Not on file   Depression: Not on file   Housing Stability: Not on file       Functional Status:  Prior to admission patient needed assistance:   Dependent ADLs:: Ambulation-walker  Dependent IADLs:: Cleaning, Cooking, Laundry, Meal Preparation, Medication Management, Money Management, Transportation  Assesssment of Functional Status: Not at baseline with ADL Functioning, Not at baseline with mobility, Not at  functional baseline    Mental Health Status:  Mental Health Status: No Current Concerns       Chemical Dependency Status:                Values/Beliefs:  Spiritual, Cultural Beliefs, Uatsdin Practices, Values that affect care:                 Additional Information:  Assessed, lives at [facility], pt has dementia. Spoke to dtr Annia, discussed FUENTES w/her and family to transport. Dtr doesn't want any new medications ordered without her knowledge.      Gurjit Marie RN

## 2022-11-15 NOTE — DISCHARGE SUMMARY
[facility]  Discharge Summary - Medicine & Pediatrics       Date of Admission:  11/14/2022  Date of Discharge:  11/15/2022  Discharging Provider: Dr. Troncoso  Discharge Service: Hospitalist Service    Discharge Diagnoses   1. Thrush    2. Dehydration    3. Generalized muscle weakness      Follow-ups Needed After Discharge   Follow-up Appointments     Follow-up and recommended labs and tests       Follow up with primary care provider, Everardo English, within 7   days for hospital follow- up.  No follow up labs or test are needed.           Discharge Disposition   Discharged to assisted living  Condition at discharge: Stable    Hospital Course   Holli Richardson is a 86 year old female admitted on 11/14/2022.  She has a past medical history significant for HFpEF, CKD 4, mild cognitive impairment.  She is admitted for failure to thrive in the setting of oral thrush.     Oral candidiasis  Failure to thrive  2 weeks of pain with swallowing leading to poor oral intake.  Exam consistent with oral candidiasis, wet prep positive for yeast.  Likely etiology of pain.  Infectious work-up otherwise unremarkable.  Will treat with clotrimazole lozenges 5 times daily. Patient stayed overnight and was able to eat and drink breakfast the next morning, morning labs were normal. Discharged after discussing with family and patient that this will take time for her mouth pain and therefore intake to improve. PT/OT evaluated and state okay to return back to [facility].      History of HFpEF.  Admission in October 2022 for acute exacerbation.  LVEF of 70% with significant left ventricular outflow obstruction.  Does have some pitting edema lower extremities, does not appear overtly hypervolemic.  Dry weight of 105 pounds based on recent admission.  Currently weighs 105 pounds.  Not on diuretics as an outpatient.     Accelerated hypertension  Blood pressure not significantly elevated in the hospital.  On Cardizem.   Avoiding ACE/ARB/hydralazine as they can worsen outflow tract obstruction.     Chronic kidney disease stage IV  Baseline range from 1.7-2.0.  Creatinine measuring 1.83 on admission, down from 2.11 at last check. Remained stable through hospitalization.      Hypomagnesemia  1.6 on admission.  Received IV replacement in the ED     Elevated troponin  Measured 33.  Stable from admission 3 weeks prior.  EKG without any evidence of ischemia.  No chest pain.  Low suspicion for ACS.    Consultations This Hospital Stay   CARE MANAGEMENT / SOCIAL WORK IP CONSULT  CARE MANAGEMENT / SOCIAL WORK IP CONSULT  PHYSICAL THERAPY ADULT IP CONSULT  OCCUPATIONAL THERAPY ADULT IP CONSULT    Code Status   No CPR- Do NOT Intubate     The patient was discussed with Dr. Ford.     Monika Troncoso MD  [address] [address]  Phone: [phone]  ______________________________________________________________________    Physical Exam   Vital Signs: Temp: 97.9  F (36.6  C) Temp src: Oral BP: 133/69 Pulse: 73   Resp: 18 SpO2: 99 % O2 Device: None (Room air)    Weight: 105 lbs 0 oz  General: Cachectic.   Psych:  Alert and oriented to person, place, and time. Able to articulate logical thoughts.   HEENT:  Eyes grossly normal to inspection. Extraocular movements intact. Pupils equal, round, and reactive to light. White plaques noted on tongue and oropharynx  Cardiovascular:  Regular rate and rhythm  Respiratory:  Lungs clear to auscultation bilaterally. No wheezing or crackles. No prolonged expiration. Symmetrical chest rise..  Musculoskeletal:  No gross extremity deformities. No peripheral edema.  GI:  Soft, non-tender abdomen. No hepatosplenomegaly. Normal active bowel sounds.  Derm:  No suspicious lesions or rashes        Primary Care Physician   Everardo English    Discharge Orders      Reason for your hospital stay    Holli came into the hospital with trouble eating due to  sores in her mouth, a fungal infection called thrush. We think that is contributing to her pain and decreased eating. We are going to treat this with fungal treatment cough drops.     Follow-up and recommended labs and tests     Follow up with primary care provider, Everardo English, within 7 days for hospital follow- up.  No follow up labs or test are needed.     Activity    Your activity upon discharge: activity as tolerated     Diet    Follow this diet upon discharge: Orders Placed This Encounter - Regular Diet Adult       Significant Results and Procedures   Most Recent 3 CBC's:Recent Labs   Lab Test 11/15/22  0642 11/14/22  1313 11/01/22  1039   WBC 10.5 11.3* 8.5   HGB 12.2 11.7 11.4*   MCV 98 98 100    383 329     Most Recent 3 BMP's:Recent Labs   Lab Test 11/15/22  0642 11/14/22  1313 11/01/22  1039    140 142   POTASSIUM 4.1 3.7 4.0   CHLORIDE 112* 112* 112*   CO2 17* 17* 17*   BUN 23.1* 23.3* 28.4*   CR 1.87* 1.83* 2.11*   ANIONGAP 11 11 13   GISELE 9.8 9.1 10.1   * 101* 86       Discharge Medications   Current Discharge Medication List      START taking these medications    Details   clotrimazole (MYCELEX) 10 MG lozenge Place 1 lozenge inside cheek 5 times daily  Qty: 50 lozenge, Refills: 0    Associated Diagnoses: Thrush         CONTINUE these medications which have NOT CHANGED    Details   acetaminophen (TYLENOL) 325 MG tablet Take 975 mg by mouth 3 times daily      cholecalciferol 50 MCG (2000 UT) tablet Take 2,000 Units by mouth daily      diltiazem ER COATED BEADS (CARDIZEM CD/CARTIA XT) 120 MG 24 hr capsule Take 1 capsule (120 mg) by mouth daily for 30 days  Qty: 30 capsule, Refills: 0    Associated Diagnoses: Acute on chronic congestive heart failure, unspecified heart failure type (H)      dorzolamide-timolol PF (COSOPT PF) 2-0.5 % opthalmic solutionh INSTILL 1 DROP INTO THE LEFT EYE TWICE DAILY  Qty: 15 each, Refills: 0    Associated Diagnoses: Glaucoma suspect, bilateral       GAVILAX 17 GM/SCOOP powder Take 17 g by mouth daily as needed for constipation      gemfibroziL (LOPID) 600 MG tablet [GEMFIBROZIL (LOPID) 600 MG TABLET] Take 600 mg by mouth 2 (two) times a day before meals.      latanoprost (XALATAN) 0.005 % ophthalmic solution Place 1 drop into both eyes At Bedtime       mirtazapine (REMERON) 7.5 MG tablet Take 7.5 mg by mouth At Bedtime      multivitamin  with lutein (OCUVITE WITH LUTEIN) CAPS per capsule Take 1 capsule by mouth daily      omeprazole (PRILOSEC) 20 MG DR capsule Take 20 mg by mouth daily before breakfast      Probiotic Product (PROBIOTIC-10 PO) Take 1 tablet by mouth daily      QUEtiapine (SEROQUEL) 25 MG tablet Take 1 tablet (25 mg) by mouth At Bedtime for 30 days  Qty: 30 tablet, Refills: 0    Associated Diagnoses: Generalized anxiety disorder      senna-docusate (SENOKOT-S/PERICOLACE) 8.6-50 MG tablet Take 1 tablet by mouth 2 times daily    Associated Diagnoses: Hip fracture, left, closed, initial encounter (H); Fall, initial encounter      sodium bicarbonate 650 MG tablet Take 1 tablet (650 mg) by mouth 2 times daily  Qty: 60 tablet, Refills: 0    Associated Diagnoses: Stage 4 chronic kidney disease (H)      UNISOM SLEEPTABS 25 MG TABS tablet Take 12.5 mg by mouth At Bedtime           Allergies   Allergies   Allergen Reactions     Statins-Hmg-Coa Reductase Inhibitors [Hmg-Coa-R Inhibitors] Muscle Pain (Myalgia)

## 2022-11-15 NOTE — PLAN OF CARE
Physical Therapy Discharge Summary    Reason for therapy discharge:    All goals and outcomes met, no further needs identified.    Progress towards therapy goal(s). See goals on Care Plan in [facility] electronic health record for goal details.  Goals met    Therapy recommendation(s):    No further therapy is recommended. Recommend pt return home to [facility]

## 2022-11-15 NOTE — PLAN OF CARE
PRIMARY DIAGNOSIS: ACUTE PAIN  OUTPATIENT/OBSERVATION GOALS TO BE MET BEFORE DISCHARGE:  1. Pain Status: Pain free.    2. Return to near baseline physical activity: Yes    3. Cleared for discharge by consultants (if involved): No    Discharge Planner Nurse   Safe discharge environment identified: Yes  Barriers to discharge: Yes       Entered by: Savi Connelly RN 11/15/2022 12:24 PM   Pt is alert to self. Pt is forgetful. Pt's v/s is stable. Pt is med complaint. Pt denies pain.pt has a poor appetite. Pt ate 20% of her breakfast and refused to eat lunch. Continue to monitor pt.  Please review provider order for any additional goals.   Nurse to notify provider when observation goals have been met and patient is ready for discharge.Goal Outcome Evaluation:

## 2023-02-07 NOTE — ED NOTES
Nursing Update: Daily ADL's: Patient assisted with morning ADL's, including a bed bath, changing cardiac leads, and perineal care. No pressure wounds noted. Skin is dry and lotion applied to her skin for comfort.     Neuro/Mentation: Patient is awake and answers questions appropriately. She knows where she is at this time.    Crescentic Complex Repair Preamble Text (Leave Blank If You Do Not Want): Extensive wide undermining was performed.

## 2024-07-31 NOTE — PROGRESS NOTES
Heart Failure Care Map  GOALS TO BE MET BEFORE DISCHARGE:    1. Decrease congestion and/or edema with diuretic therapy to achieve near optimal volume status.     Dyspnea improved: Yes, satisfactory for discharge.   Edema improved: Yes, satisfactory for discharge.        Net I/O and Weights since admission:   09/22 2300 - 10/22 2259  In: 750 [P.O.:750]  Out: 4075 [Urine:4075]  Net: -3325     Vitals:    10/19/22 2247 10/20/22 1658 10/21/22 0500 10/22/22 0317   Weight: 49.9 kg (110 lb) 48.2 kg (106 lb 3.2 oz) 47.6 kg (105 lb) 45.2 kg (99 lb 11.2 oz)       2.  O2 sats > 90% on room air, or at prior home O2 therapy level.      Able to wean O2 this shift to keep sats above 90%?: Yes, satisfactory for discharge.   Does patient use Home O2? No          Current oxygenation status:   SpO2: 95 %     O2 Device: None (Room air),      3.  Tolerates ambulation and mobility near baseline.     Ambulation: Yes, satisfactory for discharge.   Times patient ambulated with staff this shift: 6, pt ambulate to bathroom frequently w/ walker.     Please review the Heart Failure Care Map for additional HF goal outcomes.    Fermin Sanderson RN  10/22/2022    Hide Additional Notes?: No Detail Level: Zone

## 2025-03-03 NOTE — PROVIDER NOTIFICATION
Pt observed to have no urine output thus far. Pt was incontinent per shift report. No urine output on purewick container or brief. Pt denied abdominal pain/discomfort, but stated that she feels she cannot void. Bladder scan result of >566 mL. Dr. Byers updated w/ pt situation and gave an order to place a [place] catheter d/t urinary retention and having surgery in the AM. NOC nurse updated.   Interventional Radiology

## (undated) DEVICE — PREP CHLORAPREP 26ML TINTED HI-LITE ORANGE 930815

## (undated) DEVICE — SOL NACL 0.9% INJ 1000ML BAG 2B1324X

## (undated) DEVICE — BRUSH FEMORAL CANAL NARROW 110033

## (undated) DEVICE — DRAPE IOBAN INCISE 36X23" 6651EZ

## (undated) DEVICE — MARKER SURG SKIN STRL 77734

## (undated) DEVICE — HOLDER LIMB VELCRO OR 0814-1533

## (undated) DEVICE — DRSG MEPILEX BORDER 3X3" 295200

## (undated) DEVICE — SUCTION MANIFOLD NEPTUNE 2 SYS 1 PORT 702-025-000

## (undated) DEVICE — PLATE GROUNDING ADULT W/CORD 9165L

## (undated) DEVICE — GLOVE SURGEON PI ORTHO SZ 7 LF

## (undated) DEVICE — SOL WATER IRRIG 1000ML BOTTLE 2F7114

## (undated) DEVICE — BONE CLEANING TIP INTERPULSE  0210-010-000

## (undated) DEVICE — GOWN IMPERVIOUS BREATHABLE 2XL/XLONG

## (undated) DEVICE — RETRIVER SUTURE LASSO HOFFEE BLUE 710000

## (undated) DEVICE — ALCOHOL ISOPROPYL 4 OZ 70% IA7004

## (undated) DEVICE — SOLUTION IRRIG 2B7127 .9NS 3000ML BAG

## (undated) DEVICE — BONE CLEANING TIP INTERPULSE FEMORAL CANAL 0210-008-000

## (undated) DEVICE — GLOVE SURGEON PI ORTHO SZ 6-1/2 LF

## (undated) DEVICE — DRSG AQUACEL AG 3.5X12" HYDROFIBER 420670

## (undated) DEVICE — SU STRATAFIX PDS PLUS 0 CT 45CM SXPP1A406

## (undated) DEVICE — BONE CEMENT MIXEVAC HI VAC W/CARTRIDGE 0306-563-000

## (undated) DEVICE — SUCTION IRR SYSTEM W/O TIP INTERPULSE HANDPIECE 0210-100-000

## (undated) DEVICE — STPL SKIN 35W 6.9MM  PXW35

## (undated) DEVICE — CUSTOM PACK TOTAL HIP SOP5BTHHEA

## (undated) DEVICE — GLOVE BIOGEL PI INDICATOR 9.0 LF  41690

## (undated) DEVICE — DRSG AQUACEL AG HYDROFIBER  3.5X10" 422605

## (undated) DEVICE — TAMPON SUCTION FEMORAL CANAL 110037

## (undated) DEVICE — BLADE SAW SAGITTAL STRK WIDE 25.4X85X1.2MM 2108-151-000

## (undated) DEVICE — GOWN XLG DISP 9545

## (undated) DEVICE — PAD HIP POSITIONING MCGUIRE 707-CPM

## (undated) DEVICE — ADH SKIN CLOSURE PREMIERPRO EXOFIN 1.0ML 3470

## (undated) DEVICE — SU ETHIBOND 5 V-37 4X30" MB66G

## (undated) DEVICE — DRAPE CONVERTORS U-DRAPE 60X72" 8476

## (undated) DEVICE — SU MONOCRYL 3-0 PS-2 18" UND MCP497G

## (undated) DEVICE — GLOVE UNDER INDICATOR PI SZ 6.5 LF 41665

## (undated) DEVICE — DRAPE IOBAN INCISE 23X17" 6650EZ

## (undated) DEVICE — GLOVE SURG PI ULTRA TOUCH M SZ 8-1/2 LF

## (undated) DEVICE — GLOVE BIOGEL INDICATOR 7.5 LF 41675

## (undated) DEVICE — SUTURE VICRYL+ 2-0 27IN CT-1 UND VCP259H

## (undated) DEVICE — SUTURE VICRYL+ 0 27IN CT-1 UND VCP260H

## (undated) DEVICE — PILLOW HIP ABDUCTION CONCAVE 1.9

## (undated) DEVICE — HOOD FLYTE SURGICOOL

## (undated) RX ORDER — FENTANYL CITRATE-0.9 % NACL/PF 10 MCG/ML
PLASTIC BAG, INJECTION (ML) INTRAVENOUS
Status: DISPENSED
Start: 2022-01-01

## (undated) RX ORDER — CEFAZOLIN SODIUM 1 G/3ML
INJECTION, POWDER, FOR SOLUTION INTRAMUSCULAR; INTRAVENOUS
Status: DISPENSED
Start: 2022-01-01

## (undated) RX ORDER — PROPOFOL 10 MG/ML
INJECTION, EMULSION INTRAVENOUS
Status: DISPENSED
Start: 2022-01-01

## (undated) RX ORDER — LIDOCAINE HYDROCHLORIDE 20 MG/ML
INJECTION, SOLUTION INFILTRATION; PERINEURAL
Status: DISPENSED
Start: 2022-01-01

## (undated) RX ORDER — KETAMINE HYDROCHLORIDE 10 MG/ML
INJECTION INTRAMUSCULAR; INTRAVENOUS
Status: DISPENSED
Start: 2022-01-01

## (undated) RX ORDER — VANCOMYCIN HYDROCHLORIDE 1 G/20ML
INJECTION, POWDER, LYOPHILIZED, FOR SOLUTION INTRAVENOUS
Status: DISPENSED
Start: 2022-01-01

## (undated) RX ORDER — ONDANSETRON 2 MG/ML
INJECTION INTRAMUSCULAR; INTRAVENOUS
Status: DISPENSED
Start: 2022-01-01

## (undated) RX ORDER — EPHEDRINE SULFATE 50 MG/ML
INJECTION, SOLUTION INTRAMUSCULAR; INTRAVENOUS; SUBCUTANEOUS
Status: DISPENSED
Start: 2022-01-01